# Patient Record
Sex: FEMALE | Race: WHITE | Employment: OTHER | URBAN - METROPOLITAN AREA
[De-identification: names, ages, dates, MRNs, and addresses within clinical notes are randomized per-mention and may not be internally consistent; named-entity substitution may affect disease eponyms.]

---

## 2020-02-27 ENCOUNTER — HOSPITAL ENCOUNTER (INPATIENT)
Age: 85
LOS: 2 days | Discharge: HOME | DRG: 291 | End: 2020-02-29
Attending: EMERGENCY MEDICINE | Admitting: INTERNAL MEDICINE
Payer: MEDICARE

## 2020-02-27 ENCOUNTER — APPOINTMENT (OUTPATIENT)
Dept: GENERAL RADIOLOGY | Age: 85
DRG: 291 | End: 2020-02-27
Attending: EMERGENCY MEDICINE
Payer: MEDICARE

## 2020-02-27 DIAGNOSIS — I50.21 ACUTE SYSTOLIC CONGESTIVE HEART FAILURE (HCC): ICD-10-CM

## 2020-02-27 DIAGNOSIS — R06.09 DOE (DYSPNEA ON EXERTION): Primary | ICD-10-CM

## 2020-02-27 PROBLEM — I50.9 HEART FAILURE (HCC): Status: ACTIVE | Noted: 2020-02-27

## 2020-02-27 LAB
ALBUMIN SERPL-MCNC: 2.3 G/DL (ref 3.5–5)
ALBUMIN/GLOB SERPL: 0.6 {RATIO} (ref 1.1–2.2)
ALP SERPL-CCNC: 219 U/L (ref 45–117)
ALT SERPL-CCNC: 20 U/L (ref 12–78)
ANION GAP SERPL CALC-SCNC: 7 MMOL/L (ref 5–15)
AST SERPL-CCNC: 22 U/L (ref 15–37)
BASOPHILS # BLD: 0.1 K/UL (ref 0–0.1)
BASOPHILS NFR BLD: 1 % (ref 0–1)
BILIRUB SERPL-MCNC: 0.2 MG/DL (ref 0.2–1)
BNP SERPL-MCNC: 4722 PG/ML
BUN SERPL-MCNC: 21 MG/DL (ref 6–20)
BUN/CREAT SERPL: 14 (ref 12–20)
CALCIUM SERPL-MCNC: 8.5 MG/DL (ref 8.5–10.1)
CHLORIDE SERPL-SCNC: 107 MMOL/L (ref 97–108)
CO2 SERPL-SCNC: 23 MMOL/L (ref 21–32)
COMMENT, HOLDF: NORMAL
CREAT SERPL-MCNC: 1.49 MG/DL (ref 0.55–1.02)
DIFFERENTIAL METHOD BLD: NORMAL
EOSINOPHIL # BLD: 0.2 K/UL (ref 0–0.4)
EOSINOPHIL NFR BLD: 2 % (ref 0–7)
ERYTHROCYTE [DISTWIDTH] IN BLOOD BY AUTOMATED COUNT: 12.9 % (ref 11.5–14.5)
GLOBULIN SER CALC-MCNC: 4.1 G/DL (ref 2–4)
GLUCOSE SERPL-MCNC: 117 MG/DL (ref 65–100)
HCT VFR BLD AUTO: 40.4 % (ref 35–47)
HGB BLD-MCNC: 13.5 G/DL (ref 11.5–16)
IMM GRANULOCYTES # BLD AUTO: 0 K/UL (ref 0–0.04)
IMM GRANULOCYTES NFR BLD AUTO: 0 % (ref 0–0.5)
LYMPHOCYTES # BLD: 1.2 K/UL (ref 0.8–3.5)
LYMPHOCYTES NFR BLD: 14 % (ref 12–49)
MCH RBC QN AUTO: 29.8 PG (ref 26–34)
MCHC RBC AUTO-ENTMCNC: 33.4 G/DL (ref 30–36.5)
MCV RBC AUTO: 89.2 FL (ref 80–99)
MONOCYTES # BLD: 0.8 K/UL (ref 0–1)
MONOCYTES NFR BLD: 9 % (ref 5–13)
NEUTS SEG # BLD: 6.6 K/UL (ref 1.8–8)
NEUTS SEG NFR BLD: 74 % (ref 32–75)
NRBC # BLD: 0 K/UL (ref 0–0.01)
NRBC BLD-RTO: 0 PER 100 WBC
PLATELET # BLD AUTO: 371 K/UL (ref 150–400)
PMV BLD AUTO: 9.9 FL (ref 8.9–12.9)
POTASSIUM SERPL-SCNC: 3.8 MMOL/L (ref 3.5–5.1)
PROT SERPL-MCNC: 6.4 G/DL (ref 6.4–8.2)
RBC # BLD AUTO: 4.53 M/UL (ref 3.8–5.2)
SAMPLES BEING HELD,HOLD: NORMAL
SODIUM SERPL-SCNC: 137 MMOL/L (ref 136–145)
TROPONIN I SERPL-MCNC: <0.05 NG/ML
WBC # BLD AUTO: 9 K/UL (ref 3.6–11)

## 2020-02-27 PROCEDURE — 74011250636 HC RX REV CODE- 250/636: Performed by: INTERNAL MEDICINE

## 2020-02-27 PROCEDURE — 74011250637 HC RX REV CODE- 250/637: Performed by: INTERNAL MEDICINE

## 2020-02-27 PROCEDURE — 96374 THER/PROPH/DIAG INJ IV PUSH: CPT

## 2020-02-27 PROCEDURE — 83880 ASSAY OF NATRIURETIC PEPTIDE: CPT

## 2020-02-27 PROCEDURE — 36415 COLL VENOUS BLD VENIPUNCTURE: CPT

## 2020-02-27 PROCEDURE — 93005 ELECTROCARDIOGRAM TRACING: CPT

## 2020-02-27 PROCEDURE — 74011000250 HC RX REV CODE- 250: Performed by: EMERGENCY MEDICINE

## 2020-02-27 PROCEDURE — 71046 X-RAY EXAM CHEST 2 VIEWS: CPT

## 2020-02-27 PROCEDURE — 85025 COMPLETE CBC W/AUTO DIFF WBC: CPT

## 2020-02-27 PROCEDURE — 80053 COMPREHEN METABOLIC PANEL: CPT

## 2020-02-27 PROCEDURE — 84484 ASSAY OF TROPONIN QUANT: CPT

## 2020-02-27 PROCEDURE — 65660000000 HC RM CCU STEPDOWN

## 2020-02-27 PROCEDURE — 99282 EMERGENCY DEPT VISIT SF MDM: CPT

## 2020-02-27 RX ORDER — ONDANSETRON 2 MG/ML
4 INJECTION INTRAMUSCULAR; INTRAVENOUS
Status: DISCONTINUED | OUTPATIENT
Start: 2020-02-27 | End: 2020-02-29 | Stop reason: HOSPADM

## 2020-02-27 RX ORDER — BISACODYL 5 MG
5 TABLET, DELAYED RELEASE (ENTERIC COATED) ORAL DAILY PRN
Status: DISCONTINUED | OUTPATIENT
Start: 2020-02-27 | End: 2020-02-29 | Stop reason: HOSPADM

## 2020-02-27 RX ORDER — SODIUM CHLORIDE 0.9 % (FLUSH) 0.9 %
5-40 SYRINGE (ML) INJECTION EVERY 8 HOURS
Status: DISCONTINUED | OUTPATIENT
Start: 2020-02-27 | End: 2020-02-29 | Stop reason: HOSPADM

## 2020-02-27 RX ORDER — FUROSEMIDE 10 MG/ML
40 INJECTION INTRAMUSCULAR; INTRAVENOUS DAILY
Status: DISCONTINUED | OUTPATIENT
Start: 2020-02-28 | End: 2020-02-27

## 2020-02-27 RX ORDER — ROSUVASTATIN CALCIUM 40 MG/1
40 TABLET, COATED ORAL
COMMUNITY
End: 2020-05-20 | Stop reason: SDUPTHER

## 2020-02-27 RX ORDER — ROSUVASTATIN CALCIUM 40 MG/1
40 TABLET, COATED ORAL
Status: DISCONTINUED | OUTPATIENT
Start: 2020-02-27 | End: 2020-02-29 | Stop reason: HOSPADM

## 2020-02-27 RX ORDER — ACETAMINOPHEN 325 MG/1
650 TABLET ORAL
Status: DISCONTINUED | OUTPATIENT
Start: 2020-02-27 | End: 2020-02-29 | Stop reason: HOSPADM

## 2020-02-27 RX ORDER — HYDRALAZINE HYDROCHLORIDE 50 MG/1
50 TABLET, FILM COATED ORAL 3 TIMES DAILY
Status: DISCONTINUED | OUTPATIENT
Start: 2020-02-27 | End: 2020-02-28

## 2020-02-27 RX ORDER — DIPHENHYDRAMINE HYDROCHLORIDE 50 MG/ML
12.5 INJECTION, SOLUTION INTRAMUSCULAR; INTRAVENOUS
Status: DISCONTINUED | OUTPATIENT
Start: 2020-02-27 | End: 2020-02-29 | Stop reason: HOSPADM

## 2020-02-27 RX ORDER — BUMETANIDE 0.25 MG/ML
1 INJECTION INTRAMUSCULAR; INTRAVENOUS
Status: COMPLETED | OUTPATIENT
Start: 2020-02-27 | End: 2020-02-27

## 2020-02-27 RX ORDER — ASPIRIN 81 MG/1
81 TABLET ORAL DAILY
Status: DISCONTINUED | OUTPATIENT
Start: 2020-02-28 | End: 2020-02-29 | Stop reason: HOSPADM

## 2020-02-27 RX ORDER — IPRATROPIUM BROMIDE AND ALBUTEROL SULFATE 2.5; .5 MG/3ML; MG/3ML
3 SOLUTION RESPIRATORY (INHALATION)
Status: DISCONTINUED | OUTPATIENT
Start: 2020-02-27 | End: 2020-02-29 | Stop reason: HOSPADM

## 2020-02-27 RX ORDER — ASPIRIN 81 MG/1
81 TABLET ORAL DAILY
COMMUNITY
End: 2021-04-23

## 2020-02-27 RX ORDER — METOPROLOL TARTRATE 50 MG/1
50 TABLET ORAL 2 TIMES DAILY
COMMUNITY
End: 2020-02-29

## 2020-02-27 RX ORDER — VENLAFAXINE HYDROCHLORIDE 75 MG/1
75 CAPSULE, EXTENDED RELEASE ORAL DAILY
COMMUNITY

## 2020-02-27 RX ORDER — FUROSEMIDE 10 MG/ML
40 INJECTION INTRAMUSCULAR; INTRAVENOUS ONCE
Status: COMPLETED | OUTPATIENT
Start: 2020-02-27 | End: 2020-02-27

## 2020-02-27 RX ORDER — FUROSEMIDE 10 MG/ML
40 INJECTION INTRAMUSCULAR; INTRAVENOUS 2 TIMES DAILY
Status: DISCONTINUED | OUTPATIENT
Start: 2020-02-28 | End: 2020-02-29

## 2020-02-27 RX ORDER — HYDRALAZINE HYDROCHLORIDE 20 MG/ML
20 INJECTION INTRAMUSCULAR; INTRAVENOUS
Status: DISCONTINUED | OUTPATIENT
Start: 2020-02-27 | End: 2020-02-29 | Stop reason: HOSPADM

## 2020-02-27 RX ORDER — ONDANSETRON 2 MG/ML
4 INJECTION INTRAMUSCULAR; INTRAVENOUS
Status: DISCONTINUED | OUTPATIENT
Start: 2020-02-27 | End: 2020-02-27

## 2020-02-27 RX ORDER — METOPROLOL TARTRATE 50 MG/1
50 TABLET ORAL 2 TIMES DAILY
Status: DISCONTINUED | OUTPATIENT
Start: 2020-02-27 | End: 2020-02-28

## 2020-02-27 RX ORDER — VENLAFAXINE HYDROCHLORIDE 37.5 MG/1
75 CAPSULE, EXTENDED RELEASE ORAL DAILY
Status: DISCONTINUED | OUTPATIENT
Start: 2020-02-28 | End: 2020-02-29 | Stop reason: HOSPADM

## 2020-02-27 RX ORDER — HYDRALAZINE HYDROCHLORIDE 50 MG/1
25 TABLET, FILM COATED ORAL 3 TIMES DAILY
Status: DISCONTINUED | OUTPATIENT
Start: 2020-02-27 | End: 2020-02-27

## 2020-02-27 RX ORDER — HYDRALAZINE HYDROCHLORIDE 50 MG/1
50 TABLET, FILM COATED ORAL 3 TIMES DAILY
COMMUNITY
End: 2020-02-29

## 2020-02-27 RX ORDER — SODIUM CHLORIDE 0.9 % (FLUSH) 0.9 %
5-40 SYRINGE (ML) INJECTION AS NEEDED
Status: DISCONTINUED | OUTPATIENT
Start: 2020-02-27 | End: 2020-02-29 | Stop reason: HOSPADM

## 2020-02-27 RX ORDER — DOCUSATE SODIUM 100 MG/1
100 CAPSULE, LIQUID FILLED ORAL AS NEEDED
Status: DISCONTINUED | OUTPATIENT
Start: 2020-02-27 | End: 2020-02-28

## 2020-02-27 RX ORDER — AMLODIPINE BESYLATE 5 MG/1
10 TABLET ORAL DAILY
Status: DISCONTINUED | OUTPATIENT
Start: 2020-02-27 | End: 2020-02-29 | Stop reason: HOSPADM

## 2020-02-27 RX ORDER — AMLODIPINE BESYLATE 5 MG/1
5 TABLET ORAL 2 TIMES DAILY
COMMUNITY
End: 2021-06-29

## 2020-02-27 RX ADMIN — AMLODIPINE BESYLATE 10 MG: 5 TABLET ORAL at 18:40

## 2020-02-27 RX ADMIN — APIXABAN 2.5 MG: 2.5 TABLET, FILM COATED ORAL at 19:08

## 2020-02-27 RX ADMIN — ROSUVASTATIN 40 MG: 40 TABLET, FILM COATED ORAL at 21:36

## 2020-02-27 RX ADMIN — HYDRALAZINE HYDROCHLORIDE 50 MG: 50 TABLET, FILM COATED ORAL at 21:36

## 2020-02-27 RX ADMIN — BUMETANIDE 1 MG: 0.25 INJECTION INTRAMUSCULAR; INTRAVENOUS at 17:27

## 2020-02-27 RX ADMIN — Medication 10 ML: at 21:35

## 2020-02-27 RX ADMIN — METOPROLOL TARTRATE 50 MG: 50 TABLET, FILM COATED ORAL at 18:44

## 2020-02-27 RX ADMIN — FUROSEMIDE 40 MG: 10 INJECTION, SOLUTION INTRAMUSCULAR; INTRAVENOUS at 19:08

## 2020-02-27 NOTE — ED TRIAGE NOTES
Patient arrives c/o shortness of breath. Patient also has increased fluid retention. Patient takes a diuretic and they called her nephrologist and they instructed patient to come to the ER. Connie MARTINEZ.

## 2020-02-27 NOTE — ED PROVIDER NOTES
Ms. Kylah Echavarria is an 59-year-old female who presents to the ER with complaints of shortness of breath and swelling. She said that she was recently discharged from the hospital 1/2 weeks ago. She was admitted in [place]. She said that she had been admitted at that hospital at the time after a fall. She is on Eliquis. They were checking her for possible injury from the fall. Currently, she had several studies done with IV contrast.  Her kidney function worsened while she was there. She was discharged and went home with her daughter here in [place]. Since she was discharged, she has had worsening swelling of her bilateral arms and legs. He said that her swelling of her arms and legs seems to change. Over the last day or so she has had mostly swelling of her both of her arms. Her leg swelling seems to have improved over the last day. However, her daughter says it does come and go regularly. Over the last 2 days she is developed significant shortness of breath with exertion. She said that she cannot go but a short distance without having to stop due to shortness of breath. She denies any chest pain. She denies any other complaints. Past Medical History:   Diagnosis Date    A-fib [facility])     Bilateral cataracts     Bronchitis     CKD (chronic kidney disease)     GERD (gastroesophageal reflux disease)     HTN (hypertension)     Hyperlipidemia     Insomnia     Stuttering        Past Surgical History:   Procedure Laterality Date    HX CAROTID ENDARTERECTOMY      HX HYSTERECTOMY      HX TONSILLECTOMY           History reviewed. No pertinent family history.     Social History     Socioeconomic History    Marital status: SINGLE     Spouse name: Not on file    Number of children: Not on file    Years of education: Not on file    Highest education level: Not on file   Occupational History    Not on file   Social Needs    Financial resource strain: Not on file    Food insecurity: Worry: Not on file     Inability: Not on file    Transportation needs:     Medical: Not on file     Non-medical: Not on file   Tobacco Use    Smoking status: Never Smoker    Smokeless tobacco: Never Used   Substance and Sexual Activity    Alcohol use: Never     Frequency: Never    Drug use: Never    Sexual activity: Not on file   Lifestyle    Physical activity:     Days per week: Not on file     Minutes per session: Not on file    Stress: Not on file   Relationships    Social connections:     Talks on phone: Not on file     Gets together: Not on file     Attends Jain service: Not on file     Active member of club or organization: Not on file     Attends meetings of clubs or organizations: Not on file     Relationship status: Not on file    Intimate partner violence:     Fear of current or ex partner: Not on file     Emotionally abused: Not on file     Physically abused: Not on file     Forced sexual activity: Not on file   Other Topics Concern    Not on file   Social History Narrative    Not on file         ALLERGIES: Keflex [cephalexin]    Review of Systems   Constitutional: Negative for chills and fever. HENT: Negative for rhinorrhea and sore throat. Respiratory: Positive for shortness of breath. Negative for cough. Cardiovascular: Negative for chest pain. Gastrointestinal: Negative for abdominal pain, diarrhea, nausea and vomiting. Genitourinary: Negative for dysuria and urgency. Musculoskeletal:        Swelling of her arms and legs   Skin: Negative for rash. Neurological: Negative for dizziness, weakness and light-headedness. Vitals:    02/27/20 1423   BP: 152/67   Pulse: 67   Resp: 18   Temp: 97.8 °F (36.6 °C)   SpO2: 93%   Weight: 68.5 kg (151 lb)   Height: 5' (1.524 m)            Physical Exam       Vital signs reviewed. Nursing notes reviewed.     Const:  No acute distress, well developed, well nourished  Head:  Atraumatic, normocephalic  Eyes:  PERRL, conjunctiva normal, no scleral icterus  Neck:  Supple, trachea midline  Cardiovascular:  RRR, no murmurs, no gallops, no rubs  Resp:  No resp distress, mild increased work of breathing, diffuse wheezes, no rhonchi, no rales,  Abd:  Soft, non-tender, non-distended, no rebound, no guarding, no CVA tenderness  MSK:  No pedal edema, normal ROM, pitting edema of the bilateral upper extremities  Neuro:  Alert and oriented x3, no cranial nerve defect  Skin:  Warm, dry, intact  Psych: normal mood and affect, behavior is normal, judgement and thought content is normal        MDM  Number of Diagnoses or Management Options  Acute systolic congestive heart failure (HCC):   PERES (dyspnea on exertion):      Amount and/or Complexity of Data Reviewed  Clinical lab tests: ordered and reviewed  Tests in the radiology section of CPT®: ordered and reviewed  Review and summarize past medical records: yes    Patient Progress  Patient progress: stable          Mrs. Alanna De is an 80-year-old female who presents to the ER with complaints of shortness of breath. She is found to have a pedal edema and pulmonary edema. Her BNP was elevated. Usually she has congestive heart failure. She does not carry this diagnosis previously. I will give her an x-ray dose of diuretics.   She is to be evaluated for admission by the hospitalist.      Procedures

## 2020-02-28 ENCOUNTER — APPOINTMENT (OUTPATIENT)
Dept: NON INVASIVE DIAGNOSTICS | Age: 85
DRG: 291 | End: 2020-02-28
Attending: INTERNAL MEDICINE
Payer: MEDICARE

## 2020-02-28 ENCOUNTER — APPOINTMENT (OUTPATIENT)
Dept: VASCULAR SURGERY | Age: 85
DRG: 291 | End: 2020-02-28
Attending: INTERNAL MEDICINE
Payer: MEDICARE

## 2020-02-28 LAB
ALBUMIN SERPL-MCNC: 2 G/DL (ref 3.5–5)
ALBUMIN/GLOB SERPL: 0.5 {RATIO} (ref 1.1–2.2)
ALP SERPL-CCNC: 180 U/L (ref 45–117)
ALT SERPL-CCNC: 16 U/L (ref 12–78)
ANION GAP SERPL CALC-SCNC: 12 MMOL/L (ref 5–15)
AST SERPL-CCNC: 22 U/L (ref 15–37)
ATRIAL RATE: 71 BPM
AV PEAK GRADIENT: 45.12 MMHG
BILIRUB SERPL-MCNC: 0.2 MG/DL (ref 0.2–1)
BNP SERPL-MCNC: 3497 PG/ML
BUN SERPL-MCNC: 22 MG/DL (ref 6–20)
BUN/CREAT SERPL: 14 (ref 12–20)
CALCIUM SERPL-MCNC: 8.1 MG/DL (ref 8.5–10.1)
CALCULATED P AXIS, ECG09: 27 DEGREES
CALCULATED R AXIS, ECG10: 13 DEGREES
CALCULATED T AXIS, ECG11: 11 DEGREES
CHLORIDE SERPL-SCNC: 107 MMOL/L (ref 97–108)
CHOLEST SERPL-MCNC: 133 MG/DL
CO2 SERPL-SCNC: 21 MMOL/L (ref 21–32)
CREAT SERPL-MCNC: 1.54 MG/DL (ref 0.55–1.02)
DIAGNOSIS, 93000: NORMAL
ECHO AO ROOT DIAM: 2.93 CM
ECHO AV PEAK GRADIENT: 17.7 MMHG
ECHO AV PEAK VELOCITY: 210.44 CM/S
ECHO AV REGURGITANT PHT: 465.7 CM
ECHO LA AREA 4C: 27.5 CM2
ECHO LA MAJOR AXIS: 4.72 CM
ECHO LA TO AORTIC ROOT RATIO: 1.61
ECHO LA VOL 2C: 66.82 ML (ref 22–52)
ECHO LA VOL 4C: 92.08 ML (ref 22–52)
ECHO LA VOL BP: 84.75 ML (ref 22–52)
ECHO LA VOL/BSA BIPLANE: 49.37 ML/M2 (ref 16–28)
ECHO LA VOLUME INDEX A2C: 38.92 ML/M2 (ref 16–28)
ECHO LA VOLUME INDEX A4C: 53.64 ML/M2 (ref 16–28)
ECHO LV E' LATERAL VELOCITY: 6.82 CM/S
ECHO LV E' SEPTAL VELOCITY: 6.46 CM/S
ECHO LV INTERNAL DIMENSION DIASTOLIC: 4.61 CM (ref 3.9–5.3)
ECHO LV INTERNAL DIMENSION SYSTOLIC: 3.04 CM
ECHO LV IVSD: 0.85 CM (ref 0.6–0.9)
ECHO LV MASS 2D: 138.4 G (ref 67–162)
ECHO LV MASS INDEX 2D: 80.6 G/M2 (ref 43–95)
ECHO LV POSTERIOR WALL DIASTOLIC: 0.79 CM (ref 0.6–0.9)
ECHO LVOT DIAM: 1.61 CM
ECHO MV A VELOCITY: 104.33 CM/S
ECHO MV AREA PHT: 3.7 CM2
ECHO MV E DECELERATION TIME (DT): 203.3 MS
ECHO MV E VELOCITY: 115.08 CM/S
ECHO MV E/A RATIO: 1.1
ECHO MV E/E' LATERAL: 16.87
ECHO MV E/E' RATIO (AVERAGED): 17.34
ECHO MV E/E' SEPTAL: 17.81
ECHO MV PRESSURE HALF TIME (PHT): 58.9 MS
ECHO PULMONARY ARTERY SYSTOLIC PRESSURE (PASP): 45 MMHG
ECHO PV MAX VELOCITY: 87.93 CM/S
ECHO PV PEAK GRADIENT: 3.1 MMHG
ECHO RV INTERNAL DIMENSION: 3.93 CM
ECHO RV TAPSE: 2.59 CM (ref 1.5–2)
ECHO TV REGURGITANT MAX VELOCITY: 306.47 CM/S
ECHO TV REGURGITANT PEAK GRADIENT: 37.6 MMHG
GLOBULIN SER CALC-MCNC: 3.7 G/DL (ref 2–4)
GLUCOSE SERPL-MCNC: 90 MG/DL (ref 65–100)
HDLC SERPL-MCNC: 55 MG/DL
HDLC SERPL: 2.4 {RATIO} (ref 0–5)
LDLC SERPL CALC-MCNC: 37.8 MG/DL (ref 0–100)
LIPID PROFILE,FLP: ABNORMAL
LVFS 2D: 34.18 %
MV DEC SLOPE: 5.66
P-R INTERVAL, ECG05: 154 MS
PISA AR MAX VEL: 335.87 CM/S
POTASSIUM SERPL-SCNC: 3.8 MMOL/L (ref 3.5–5.1)
PROT SERPL-MCNC: 5.7 G/DL (ref 6.4–8.2)
Q-T INTERVAL, ECG07: 412 MS
QRS DURATION, ECG06: 78 MS
QTC CALCULATION (BEZET), ECG08: 447 MS
SODIUM SERPL-SCNC: 140 MMOL/L (ref 136–145)
TRIGL SERPL-MCNC: 201 MG/DL (ref ?–150)
TSH SERPL DL<=0.05 MIU/L-ACNC: 3.43 UIU/ML (ref 0.36–3.74)
VENTRICULAR RATE, ECG03: 71 BPM
VLDLC SERPL CALC-MCNC: 40.2 MG/DL

## 2020-02-28 PROCEDURE — 74011250636 HC RX REV CODE- 250/636: Performed by: INTERNAL MEDICINE

## 2020-02-28 PROCEDURE — 97161 PT EVAL LOW COMPLEX 20 MIN: CPT

## 2020-02-28 PROCEDURE — 74011250637 HC RX REV CODE- 250/637: Performed by: INTERNAL MEDICINE

## 2020-02-28 PROCEDURE — 93306 TTE W/DOPPLER COMPLETE: CPT

## 2020-02-28 PROCEDURE — 97116 GAIT TRAINING THERAPY: CPT

## 2020-02-28 PROCEDURE — 93970 EXTREMITY STUDY: CPT

## 2020-02-28 PROCEDURE — 65660000000 HC RM CCU STEPDOWN

## 2020-02-28 PROCEDURE — 84443 ASSAY THYROID STIM HORMONE: CPT

## 2020-02-28 PROCEDURE — 94760 N-INVAS EAR/PLS OXIMETRY 1: CPT

## 2020-02-28 PROCEDURE — 36415 COLL VENOUS BLD VENIPUNCTURE: CPT

## 2020-02-28 PROCEDURE — 80053 COMPREHEN METABOLIC PANEL: CPT

## 2020-02-28 PROCEDURE — 80061 LIPID PANEL: CPT

## 2020-02-28 PROCEDURE — 83880 ASSAY OF NATRIURETIC PEPTIDE: CPT

## 2020-02-28 PROCEDURE — 74011250637 HC RX REV CODE- 250/637: Performed by: NURSE PRACTITIONER

## 2020-02-28 RX ORDER — DOCUSATE SODIUM 100 MG/1
100 CAPSULE, LIQUID FILLED ORAL
Status: DISCONTINUED | OUTPATIENT
Start: 2020-02-28 | End: 2020-02-29 | Stop reason: HOSPADM

## 2020-02-28 RX ORDER — CARVEDILOL 12.5 MG/1
12.5 TABLET ORAL 2 TIMES DAILY WITH MEALS
Status: DISCONTINUED | OUTPATIENT
Start: 2020-02-28 | End: 2020-02-29 | Stop reason: HOSPADM

## 2020-02-28 RX ADMIN — APIXABAN 2.5 MG: 2.5 TABLET, FILM COATED ORAL at 09:45

## 2020-02-28 RX ADMIN — Medication 10 ML: at 09:45

## 2020-02-28 RX ADMIN — FUROSEMIDE 40 MG: 10 INJECTION, SOLUTION INTRAMUSCULAR; INTRAVENOUS at 09:45

## 2020-02-28 RX ADMIN — ROSUVASTATIN 40 MG: 40 TABLET, FILM COATED ORAL at 21:24

## 2020-02-28 RX ADMIN — Medication 10 ML: at 13:45

## 2020-02-28 RX ADMIN — AMLODIPINE BESYLATE 10 MG: 5 TABLET ORAL at 09:45

## 2020-02-28 RX ADMIN — HYDRALAZINE HYDROCHLORIDE 50 MG: 50 TABLET, FILM COATED ORAL at 09:44

## 2020-02-28 RX ADMIN — FUROSEMIDE 40 MG: 10 INJECTION, SOLUTION INTRAMUSCULAR; INTRAVENOUS at 17:16

## 2020-02-28 RX ADMIN — APIXABAN 2.5 MG: 2.5 TABLET, FILM COATED ORAL at 17:17

## 2020-02-28 RX ADMIN — METOPROLOL TARTRATE 50 MG: 50 TABLET, FILM COATED ORAL at 09:45

## 2020-02-28 RX ADMIN — VENLAFAXINE HYDROCHLORIDE 75 MG: 37.5 CAPSULE, EXTENDED RELEASE ORAL at 09:45

## 2020-02-28 RX ADMIN — CARVEDILOL 12.5 MG: 12.5 TABLET, FILM COATED ORAL at 17:17

## 2020-02-28 RX ADMIN — ASPIRIN 81 MG: 81 TABLET, COATED ORAL at 09:45

## 2020-02-28 RX ADMIN — Medication 10 ML: at 21:24

## 2020-02-28 RX ADMIN — Medication 10 ML: at 07:01

## 2020-02-28 NOTE — PROGRESS NOTES
Bedside and Verbal shift change report given to Julia W Kadeem Mir RN (oncoming nurse) by JANELL Francis RN (offgoing nurse). Report given with SBAR, Kardex, Intake/Output, MAR and Recent Results.

## 2020-02-28 NOTE — DISCHARGE SUMMARY
[address] [facility] Adult  Hospitalist Group                                                                                          Hospitalist Progress Note  Loye Siemens, MD  Answering service: [phone] OR [phone] from in house phone        Date of Service:  2020  NAME:  Rekha Jefferson  :  1932  MRN:  [id number]      Admission Summary:   Rekha Jefferson is a 80 y.o. female who presents with shortness of breath, swelling of arms. Patient is accompanied with daughter. History obtained mostly from patient's daughter, Minh Brar. She reported patient had a fall 2 weeks ago and she was admitted in [facility] in [place], where she was evaluated for fall. She also had kidney functions abnormal in the hospital there and was on IV diuretics. Patient was seen by nephrologist in the hospital and was continued on diuretic pill on discharge. Daughter reports that but in discharge medication she could not find any diuretic. After her discharge she saw PCP in [place] and also nephrologist.  But gradually her swelling in both legs and arms increased and today she was very short of breath with wheezing. Therefore they decided to come to the [facility] ER. Interval history / Subjective:   Breathing is improved today. Bilateral arm swelling persists.  She is unsure if they checked her for a blood clot at her prior hospital.      Assessment & Plan:     Dyspnea, upper extremity swelling likely secondary to CHF exacerbation, diastolic   Elevated proBNP, Rales in the lungs bases  Monitor on telemetry   Monitor input and output, daily weights, fluid restriction, low-salt diet  Continue IV lasix, and plan to transition to PO tomorrow   Echocardiogram with diastolic failure  Consult cardiology  Obtain doppler UE to r/o DVT     JIM on CKD 3  Baseline creatinine unknown  Monitor kidney functions`  Avoid nephrotoxins  Need baseline data from prior hospitalization      Accelerated hypertension  Resume home medications including hydralazine 50 mg 3 times daily, amlodipine 10 mg daily, Lopressor 50 mg twice daily  Hydralazine PRN     Paroxysmal A. Fib rate controlled   continue Lopressor and Eliquis      history of TIA  Continue aspirin and statin     History of anxiety/depression  Continue venlafaxine     GERD  Stable    Code status: FULL  DVT prophylaxis: eliquis     Care Plan discussed with: Patient/Family  Anticipated Disposition: Home w/Family  Anticipated Discharge: Less than 24 hours     Hospital Problems  Never Reviewed          Codes Class Noted POA    Heart failure ([facility] [address] ICD-10-CM: I50.9  ICD-9-CM: 428.9  2/27/2020 Unknown                Review of Systems:   A comprehensive review of systems was negative except for that written in the HPI. Vital Signs:    Last 24hrs VS reviewed since prior progress note. Most recent are:  Visit Vitals  /56 (BP 1 Location: Right arm, BP Patient Position: At rest)   Pulse 77   Temp 98.4 °F (36.9 °C)   Resp 18   Ht 5' (1.524 m)   Wt 74.5 kg (164 lb 3.9 oz)   SpO2 95%   Breastfeeding No   BMI 32.08 kg/m²         Intake/Output Summary (Last 24 hours) at 2/28/2020 1659  Last data filed at 2/28/2020 1212  Gross per 24 hour   Intake 430 ml   Output    Net 430 ml        Physical Examination:             Constitutional:  No acute distress, cooperative, pleasant    ENT:  Oral mucosa moist, oropharynx benign. Resp:  Crackles in the bases. No wheezing/rhonchi/rales. No accessory muscle use   CV:  Regular rhythm, normal rate, no murmurs, gallops, rubs    GI:  Soft, non distended, non tender. normoactive bowel sounds, no hepatosplenomegaly     Musculoskeletal:  b/l upper extremity edema, R>L , warm, 2+ pulses throughout    Neurologic:  Moves all extremities.   AAOx3, CN II-XII reviewed     Skin:  Good turgor, no rashes or ulcers       Data Review:    Review and/or order of clinical lab test  Review and/or order of tests in the radiology section of CPT  Review and/or order of tests in the medicine section of CPT      Labs:     Recent Labs     02/27/20  1430   WBC 9.0   HGB 13.5   HCT 40.4        Recent Labs     02/28/20  0501 02/27/20  1430    137   K 3.8 3.8    107   CO2 21 23   BUN 22* 21*   CREA 1.54* 1.49*   GLU 90 117*   CA 8.1* 8.5     Recent Labs     02/28/20  0501 02/27/20  1430   SGOT 22 22   ALT 16 20   * 219*   TBILI 0.2 0.2   TP 5.7* 6.4   ALB 2.0* 2.3*   GLOB 3.7 4.1*     No results for input(s): INR, PTP, APTT, INREXT in the last 72 hours. No results for input(s): FE, TIBC, PSAT, FERR in the last 72 hours. No results found for: FOL, RBCF   No results for input(s): PH, PCO2, PO2 in the last 72 hours.   Recent Labs     02/27/20  1430   TROIQ <0.05     Lab Results   Component Value Date/Time    Cholesterol, total 133 02/28/2020 05:01 AM    HDL Cholesterol 55 02/28/2020 05:01 AM    LDL, calculated 37.8 02/28/2020 05:01 AM    Triglyceride 201 (H) 02/28/2020 05:01 AM    CHOL/HDL Ratio 2.4 02/28/2020 05:01 AM     No results found for: GLUCPOC  No results found for: COLOR, APPRN, SPGRU, REFSG, DEVIN, PROTU, GLUCU, KETU, BILU, UROU, BENJAMIN, LEUKU, GLUKE, EPSU, BACTU, WBCU, RBCU, CASTS, UCRY      Medications Reviewed:     Current Facility-Administered Medications   Medication Dose Route Frequency    carvediloL (COREG) tablet 12.5 mg  12.5 mg Oral BID WITH MEALS    docusate sodium (COLACE) capsule 100 mg  100 mg Oral BID PRN    ondansetron (ZOFRAN) injection 4 mg  4 mg IntraVENous Q6H PRN    amLODIPine (NORVASC) tablet 10 mg  10 mg Oral DAILY    apixaban (ELIQUIS) tablet 2.5 mg  2.5 mg Oral BID    aspirin delayed-release tablet 81 mg  81 mg Oral DAILY    venlafaxine-SR (EFFEXOR-XR) capsule 75 mg  75 mg Oral DAILY    furosemide (LASIX) injection 40 mg  40 mg IntraVENous BID    hydrALAZINE (APRESOLINE) 20 mg/mL injection 20 mg  20 mg IntraVENous Q6H PRN    sodium chloride (NS) flush 5-40 mL  5-40 mL IntraVENous Q8H    sodium chloride (NS) flush 5-40 mL  5-40 mL IntraVENous PRN    acetaminophen (TYLENOL) tablet 650 mg  650 mg Oral Q4H PRN    diphenhydrAMINE (BENADRYL) injection 12.5 mg  12.5 mg IntraVENous Q4H PRN    bisacodyL (DULCOLAX) tablet 5 mg  5 mg Oral DAILY PRN    rosuvastatin (CRESTOR) tablet 40 mg  40 mg Oral QHS    albuterol-ipratropium (DUO-NEB) 2.5 MG-0.5 MG/3 ML  3 mL Nebulization Q6H PRN     ______________________________________________________________________  EXPECTED LENGTH OF STAY: - - -  ACTUAL LENGTH OF STAY:          1                 Lisa Gonsales MD

## 2020-02-28 NOTE — PROGRESS NOTES
1630: Bedside shift change report given to Samia Moore (oncoming nurse) by Johny Rincon (offgoing nurse). Report included the following information SBAR, Kardex, Intake/Output, MAR and Cardiac Rhythm NSA. 1930: Bedside shift change report given to Anabel (oncoming nurse) byLauryn  (offgoing nurse). Report included the following information SBAR, Kardex, Intake/Output and MAR.

## 2020-02-28 NOTE — CONSULTS
Cardiology Consult Note      Patient Name: Karl Greenwood  : 1932 MRN: [id number]  Date: 2020  Time: 10:00 AM    Admit Diagnosis: Heart failure [facility]) [I50.9]    Primary Cardiologist:  Cardiologist in [place]. Consulting Cardiologist: Abiola Gomez M.D.    Reason for Consult: Aissatou Gutierrez    Requesting MD: Dr. Zeina Morrison MD    HPI:  Karl Greenwood is a 80 y.o. female admitted on 2020  for Heart failure ([facility] [address] [I50.9]. Has PMH of HTN, HLD, PAF (on eliquis), GERD, CKD, Carotid artery disease s/p bilateral carotid endarterectomies, TIA. She presents with chief c/o SOB and edema of BLE and BUE extremities. She was discharged from [place] [address] 2 weeks ago where she was hospitalized for approximately 1 week after a fall. The circumstances of the fall are unknown although daughter notes she was told pt was dehydrated on presentation at that time. Patient did not remember the fall. She was evaluated at the hospital and daughter reports that she developed worsened kidney function due to contrast studies. She says she was supposed to be discharged on diuretic but was not. She went to see nephrologist few days later and he did not have her on diuretic. Daughter then brought pt to [facility] to stay with her for a while. She states that SOB worsened and she had worsened BLE and BUE edema prompting visit to ER. Pt notes that she has had PERES for approximately 6 months. Although she also states that she exercises by walking on treadmill and lifting some light weights without SOB. She said she had a stress test years ago. Denies any history of MI or CHF. Denies any history of chest pain. In ER, she was noted to have mild edema on CXR with bilateral pleural effusions, pro BNP>4000 and troponin negative. She has been receiving IV lasix BID.     Records found in careeverywhere (epic) noted transthoracic Echo in 5/2018 with EF 70%, concentric remodeling, mild MR, trivial pericardial effusion, Mild AI. SH: never smoker, no chewing tobacco. Occasional etoh use  FH: Mother had cva in her 66's. Father had MI in his 66's. Subjective:  Currently denies chest pain, dizziness. Reports mild SOB which she says has improved since admission. Assessment and Plan     1. Acute diastolic CHF:  New diagnosis. -Pro BNP 4722 on admit. -CXR with early mild congestive changes and Bilateral pleural effusions.   -Preliminary review of echo notes normal EF. Will review final report. -NYHA IV on admission   -Control BP (see below)     2. CKD: stage IIIB   -creatinine with slight trend up (1.54)   -Renal disease may also be contributing to elevated pro BNP   -Recheck bmp in a.m. 3. HTN:  bp uncontrolled   -Continue amlodipine 10 mg daily   -Stop hydralazine   -Add Coreg 12.5 mg BID and stop Lopressor   -prn hydralazine    4. Hx of PAF:  NSR on admission, Now afib, rate controlled   -Change Lopressor to Coreg as above   -[id number]=5 (age, female, HTN, CHF, TIA, Carotid disease):  Continue eliquis 2.5 mg BID (adjusted for age, creatinine)   -TSH: 3.43     5. HLD:  LCL 37, HDL 55   -Continue Crestor 40 mg daily    6. Hx of TIA:   -on statin, asa PTA and here. 80 y.o. female with PMH which includes HTN, PAF, CKD. Presents with chief c/o SOB, mild extremity edema,  mild congestive changes on CXR as well as elevated pro BNP. Preliminary review of echocardiogram indicates normal EF. Improving with diuretics. Suspect edema, elevated pro BNP suspect related to abnormal kidney function and hypertensive heart disease/acute diastolic CHF. Agree with continued diuretics and change antihypertensives as above. Cardiology Attending:Patient seen and examined. I agree with NP assessment and plans. BP should improve with loop diuretic. Farhan Colon MD 2/28/2020 1:59 PM         Cardiac testing hx:  TTE 5/2018: TTE 05/16/2018: \"1. Left Ventricle: The left ventricle is small. Normal left ventricular ejection fraction. LV Ejection Fraction is 70 %. Concentric remodeling. 2. Left Atrium: The left atrium is normal in size. 3. Mitral Valve: There is mild primary mitral valve regurgitation. 4. Pulmonic Valve: There is trivial pulmonic regurgitation. 5. Pericardium: Trivial pericardial effusion. 6. Aortic Valve: Mild aortic valve regurgitation. 7. Atrial Septum: Agitated saline bubble study is negative for intracardiac shunt. No shunt by color Doppler. \"    Review of Symptoms:  Constitutional: Negative for fever, chills, weight loss,    HEENT: Negative for nosebleeds, vision changes. Respiratory:Positive mild SOB  Cardiovascular: Negative for chest pain, palpitations, orthopnea,positive arm and leg swelling, negative syncope  Gastrointestinal: Negative for nausea, vomiting,  blood in stool and melena, abdominal pain   Genitourinary: Negative for dysuria, and hematuria. Musculoskeletal: Negative for myalgias  Skin: Negative for rash.    Heme: no bleeding  Neurological: Negative for speech changes and focal weakness      Previous treatment/evaluation includes remote stress test, echocardiogram .  Cardiac risk factors: dyslipidemia, hypertension, post-menopausal.    Past Medical History:   Diagnosis Date    A[facility]fib ([facility] [address]     Bilateral cataracts     Bronchitis     CKD (chronic kidney disease)     GERD (gastroesophageal reflux disease)     HTN (hypertension)     Hyperlipidemia     Insomnia     Stuttering      Past Surgical History:   Procedure Laterality Date    HX CAROTID ENDARTERECTOMY      HX HYSTERECTOMY      HX TONSILLECTOMY       Current Facility-Administered Medications   Medication Dose Route Frequency    carvediloL (COREG) tablet 12.5 mg  12.5 mg Oral BID WITH MEALS    ondansetron (ZOFRAN) injection 4 mg  4 mg IntraVENous Q6H PRN    docusate sodium (COLACE) capsule 100 mg  100 mg Oral PRN    amLODIPine (NORVASC) tablet 10 mg  10 mg Oral DAILY    apixaban (ELIQUIS) tablet 2.5 mg  2.5 mg Oral BID    aspirin delayed-release tablet 81 mg  81 mg Oral DAILY    venlafaxine-SR (EFFEXOR-XR) capsule 75 mg  75 mg Oral DAILY    furosemide (LASIX) injection 40 mg  40 mg IntraVENous BID    hydrALAZINE (APRESOLINE) 20 mg/mL injection 20 mg  20 mg IntraVENous Q6H PRN    sodium chloride (NS) flush 5-40 mL  5-40 mL IntraVENous Q8H    sodium chloride (NS) flush 5-40 mL  5-40 mL IntraVENous PRN    acetaminophen (TYLENOL) tablet 650 mg  650 mg Oral Q4H PRN    diphenhydrAMINE (BENADRYL) injection 12.5 mg  12.5 mg IntraVENous Q4H PRN    bisacodyL (DULCOLAX) tablet 5 mg  5 mg Oral DAILY PRN    rosuvastatin (CRESTOR) tablet 40 mg  40 mg Oral QHS    albuterol-ipratropium (DUO-NEB) 2.5 MG-0.5 MG/3 ML  3 mL Nebulization Q6H PRN       Allergies   Allergen Reactions    Keflex [Cephalexin] Hives      History reviewed. No pertinent family history. Social History     Socioeconomic History    Marital status: SINGLE     Spouse name: Not on file    Number of children: Not on file    Years of education: Not on file    Highest education level: Not on file   Tobacco Use    Smoking status: Never Smoker    Smokeless tobacco: Never Used   Substance and Sexual Activity    Alcohol use: Never     Frequency: Never    Drug use: Never       Objective:    Physical Exam    Vitals:   Vitals:    02/27/20 2329 02/28/20 0451 02/28/20 0703 02/28/20 0932   BP: 134/40 160/51 154/52 154/52   Pulse: 68 79 69    Resp: 20 18 16    Temp: 98.4 °F (36.9 °C) 97.7 °F (36.5 °C) 97.7 °F (36.5 °C)    SpO2: 92% 94% 92%    Weight:  164 lb 3.9 oz (74.5 kg)  164 lb 3.9 oz (74.5 kg)   Height:    5' (1.524 m)       General:    Alert, cooperative, no distress, appears stated age. Neck:   Supple, no carotid bruit and no JVD.    Back:     Symmetric,    Lungs:     Faint expiratory wheeze bilaterally   Heart[de-identified]    Irregularly irregular  rate and rhythm, S1, S2 normal, no murmur, click, rub or gallop. Abdomen:     Soft, non-tender. Bowel sounds normal. No masses,  No      organomegaly. Extremities:   Trace upper extremity edema, no BLE edema. Vascular:   Pulses - 2+ radial.2+ Rt DP, 1+ left dp   Skin:   Skin color normal. No rashes or lesions   Neurologic:   Alert, oriented, GORDON. Telemetry: afib    ECG: NSR  Data Review:     Radiology:   XR Results (most recent):  Results from Hospital Encounter encounter on 02/27/20   XR CHEST PA LAT    Narrative Chest 2 views dated 2/27/2020. History is shortness of breath    AP and lateral views of the chest were obtained. The cardiac silhouette is  enlarged. There is abnormal prominence of the pulmonary interstitial markings  (particularly at the lung bases). Developing congestive change must be  considered. There is blunting of the left costophrenic angle. There is  prominence of the minor fissure on the right. This is compatible with the  presence of bilateral pleural effusions. There is obliteration of the left  hemidiaphragm. Superimposed parenchymal disease at the left lung base cannot be  excluded. Impression IMPRESSION: Expiratory examination of chest with findings suggestive of  developing, mild congestive change. Presence of bilateral pleural effusions. Left basilar atelectasis/infiltration cannot be excluded. Recent Labs     02/27/20  1430   TROIQ <0.05     Recent Labs     02/28/20  0501 02/27/20  1430    137   K 3.8 3.8    107   CO2 21 23   BUN 22* 21*   CREA 1.54* 1.49*   GLU 90 117*   CA 8.1* 8.5     Recent Labs     02/27/20  1430   WBC 9.0   HGB 13.5   HCT 40.4        Recent Labs     02/28/20  0501 02/27/20  1430   SGOT 22 22   * 219*     Recent Labs     02/28/20  0501   CHOL 133   LDLC 37.8     Recent Labs     02/28/20  0501   TSH 3.43       Thank you very much for this referral. I appreciate the opportunity to participate in this patient's care. I will follow along with above stated plan.     Katherine Castillo Brielle Welsh NP         [facility] [address], [address] [place] [address],[address]     [facility], [facility]     [phone]    CC: Other, MD Gail

## 2020-02-28 NOTE — H&P
[address] [facility] Adult  Hospitalist Group  History and Physical    Primary Care Provider: Other, MD Gail  Date of Service:  2/27/2020    Subjective:     Rekha Jefferson is a 80 y.o. female who presents with shortness of breath, swelling of arms. Patient is accompanied with daughter. History obtained mostly from patient's daughter, Minh Brar. She reported patient had a fall 2 weeks ago and she was admitted in [facility] in [place], where she was evaluated for fall. She also had kidney functions abnormal in the hospital there and was on IV diuretics. Patient was seen by nephrologist in the hospital and was continued on diuretic pill on discharge. Daughter reports that but in discharge medication she could not find any diuretic. After her discharge she saw PCP in [place] and also nephrologist.  But gradually her swelling in both legs and arms increased and today she was very short of breath with wheezing. Therefore they decided to come to the [facility] ER. Patient has past medical history of paroxysmal A. fib on Lopressor 50 mg twice daily and Eliquis on 2.5 mg twice daily, CKD 3, GERD, hypertension on hydralazine 25 mg 3 times daily, amlodipine 10 mg daily, history of anxiety on venlafaxine 75 mg daily, history of TIA on aspirin 81 mg daily and statin which patient's daughter does not remember which statin she was on. Review of Systems:    A comprehensive review of systems was negative except for that written in the History of Present Illness.      Past Medical History:   Diagnosis Date    A-fib [facility])     Bilateral cataracts     Bronchitis     CKD (chronic kidney disease)     GERD (gastroesophageal reflux disease)     HTN (hypertension)     Hyperlipidemia     Insomnia     Stuttering       Past Surgical History:   Procedure Laterality Date    HX CAROTID ENDARTERECTOMY      HX HYSTERECTOMY      HX TONSILLECTOMY       Prior to Admission medications    Not on File     Allergies Allergen Reactions    Keflex [Cephalexin] Hives      History reviewed. No pertinent family history. SOCIAL HISTORY:  Patient resides at Home. Patient ambulates with walking. Smoking history: never  Alcohol history: none        Objective:       Physical Exam:   Visit Vitals  /63   Pulse 71   Temp 98 °F (36.7 °C)   Resp 15   Ht 5' (1.524 m)   Wt 68.5 kg (151 lb)   SpO2 94%   BMI 29.49 kg/m²     General appearance: alert, cooperative, no distress, appears stated age  Head: Normocephalic, without obvious abnormality, atraumatic  Eyes: negative  Neck: no carotid bruit, difficult to assess JVD due to body habitus  Lungs: wheezes R base, L base, diminished breath sounds R base  Heart: regular rate and rhythm, S1, S2 normal, no murmur, click, rub or gallop  Abdomen: soft, non-tender. Bowel sounds normal. No masses,  no organomegaly  Extremities: Edema Upper Extremity B/L+  Pulses: 2+ and symmetric  Skin: Skin color, texture, turgor normal. No rashes or lesions  Neurologic: Grossly normal  Cap refill: Brisk, less than 3 seconds  Pulses: 2+, symmetric in all extremities    ECG:  Normal sinus rhythm with sinus arrhythmia    Data Review: All diagnostic labs and studies have been reviewed. Xr Chest Pa Lat    Result Date: 2/27/2020  IMPRESSION: Expiratory examination of chest with findings suggestive of developing, mild congestive change. Presence of bilateral pleural effusions. Left basilar atelectasis/infiltration cannot be excluded.         Assessment:     Active Problems:    Heart failure ([address] (2/27/2020)        Plan:     Dyspnea, upper extremity swelling likely secondary to CHF exacerbation, unable to do to determine the type of heart failure due to unavailability of echo at the time of admission  Elevated proBNP, Rales in the lungs bases  Monitor on telemetry   Monitor input and output, daily weights, fluid restriction, low-salt diet  Received Bumex 1 mg IV in the ER, will give 40 mg IV Lasix in the evening today  IV Lasix 40 mg twice daily from tomorrow morning. Check echocardiogram  Consult cardiology      JIM on CKD 3  Baseline creatinine unknown  Hope to improve with diuretics  Monitor kidney functions  Avoid nephrotoxins    Accelerated hypertension  Resume home medications including hydralazine 50 mg 3 times daily, amlodipine 10 mg daily, Lopressor 50 mg twice daily  Hydralazine PRN    Paroxysmal A. Fib rate controlled   continue Lopressor and Eliquis     history of TIA  Continue aspirin and statin    History of anxiety/depression  Continue venlafaxine    GERD  Stable    Patient's Baseline: Ambulates with walking  DVT ppx: SCD  Code status: Full  Disposition: pending hospital course  Care plan discussed with patient/family and nurse.                   FUNCTIONAL STATUS PRIOR TO HOSPITALIZATION (including history of recent falls):walking    Signed By: Winston Hoover MD     February 27, 2020

## 2020-02-28 NOTE — ACP (ADVANCE CARE PLANNING)
Advance Care Planning Note    Name: Yazmin Arana  YOB: 1932  MRN: [id number]  Admission Date: 2/27/2020  4:47 PM    Date of discussion: 2/27/2020    Active Diagnoses:    Hospital Problems  Never Reviewed          Codes Class Noted POA    Heart failure ([facility] [address] ICD-10-CM: I50.9  ICD-9-CM: 428.9  2/27/2020 Unknown               These active diagnoses are of sufficient risk that focused discussion on advance care planning is indicated in order to allow the patient to thoughtfully consider personal goals of care, and if situations arise that prevent the ability to personally give input, to ensure appropriate representation of their personal desires for different levels and aggressiveness of care. Discussion:     Persons present and participating in discussion: Henrik White MD,Daughter, Cristina izquierdo    Discussion:   CPR, Cardioversion,Pacing,Pressors, Intubation,Bipap/CPAP,Antibiotics,Renal failure needing Dialysis. CODE STATUS: Full    Time Spent:     Total time spent face-to-face in education and discussion: 20 minutes.      Henrik Cervantes MD  2/27/2020  8:14 PM

## 2020-02-28 NOTE — PROGRESS NOTES
Admission Medication Reconciliation:    Information obtained from:  Pt family member (daughter), Pt, Discharge Med List  RxQuery data available¹:  YES    Comments/Recommendations: All medications/allergies have been reviewed and updated; last medication administration times were this morning. Second dose Hydralazine taken at noon. PP is an 88 YOF cc swelling in limbs and hands. Pt discharged from hospital in [place] on 2/16 and no diuretic was initiated. Pt reported feeling symptom relief after Lasix doses. Pt is not sure if she takes amlodipine 5 mg twice daily or amlodipine 10 mg twice daily. Daughter in room reported giving Pt \"2 tablets of hydralazine 3 times a day\" but she is not sure if each tab is 25 mg (each dose 50 mg) or 50 mg (each dose 100 mg). Pt raised concern that she last took vitamin D in past month but is unsure if she should RF Rx. Changes made to Prior to Admission (PTA) Medication List:   ?   Medications Added:   - Amlodipine 5 mg 1 tab BID PO  - Aspirin 81 mg 1 tab QD PO  - Eliquis 2.5 mg 1 tab BID PO  - Hydralazine 50 mg TID PO (daughter stated \"gives 2 pills 3 times a day\")  - Metoprolol tartrate 50 mg 1 tab BID PO  - Rosuvastatin 40 mg 1 tab HS PO  - Venlafaxine 75 mg ER 1 tab QD PO? Medications Changed:   - None   ? Medications Removed:   - None     ¹RxQuery pharmacy benefit data reflects medications filled and processed through the patient's insurance, however   this data does NOT capture whether the medication was picked up or is currently being taken by the patient.     Allergies:  Keflex [cephalexin]    Significant PMH/Disease States:   Past Medical History:   Diagnosis Date    A-fib ([facility] [address] [facility]     Bilateral cataracts     Bronchitis     CKD (chronic kidney disease)     GERD (gastroesophageal reflux disease)     HTN (hypertension)     Hyperlipidemia     Insomnia     Stuttering        Chief Complaint for this Admission:    Chief Complaint   Patient presents with Shortness of Breath       Prior to Admission Medications:   Prior to Admission Medications   Prescriptions Last Dose Informant Patient Reported? Taking? amLODIPine (NORVASC) 5 mg tablet 2/27/2020 at Unknown time  Yes Yes   Sig: Take 5 mg by mouth two (2) times a day. apixaban (ELIQUIS) 2.5 mg tablet 2/27/2020 at Unknown time  Yes Yes   Sig: Take 2.5 mg by mouth two (2) times a day. aspirin delayed-release 81 mg tablet 2/27/2020 at Unknown time  Yes Yes   Sig: Take 81 mg by mouth daily. hydrALAZINE (APRESOLINE) 50 mg tablet 2/27/2020 at Unknown time  Yes Yes   Sig: Take 50 mg by mouth three (3) times daily. metoprolol tartrate (LOPRESSOR) 50 mg tablet 2/27/2020 at Unknown time  Yes Yes   Sig: Take 50 mg by mouth two (2) times a day. rosuvastatin (CRESTOR) 40 mg tablet 2/26/2020 at Unknown time  Yes Yes   Sig: Take 40 mg by mouth nightly. venlafaxine-SR (EFFEXOR-XR) 75 mg capsule 2/27/2020 at Unknown time  Yes Yes   Sig: Take 75 mg by mouth daily. Facility-Administered Medications: None           Thank you for allowing pharmacy to participate in the coordination of this patient's care. If you have any other questions, please contact the medication reconciliation pharmacist at x [phone]. Edelmira Keller Pharm.  D. Candidate 2022

## 2020-02-28 NOTE — ROUTINE PROCESS
Bedside and Verbal shift change report given to Julio Canas (oncoming nurse) by Brodie Mcneill (offgoing nurse). Report included the following information SBAR, Kardex, Procedure Summary, Intake/Output and Cardiac Rhythm NSA test result.   
 
Pending release of information from [facility]3] in [facility]

## 2020-02-28 NOTE — PROGRESS NOTES
Problem: Falls - Risk of  Goal: *Absence of Falls  Description  Document Suzan Hayes Fall Risk and appropriate interventions in the flowsheet.   Outcome: Progressing Towards Goal  Note: Fall Risk Interventions:  Mobility Interventions: Patient to call before getting OOB         Medication Interventions: Patient to call before getting OOB    Elimination Interventions: Call light in reach, Toileting schedule/hourly rounds, Patient to call for help with toileting needs    History of Falls Interventions: Room close to nurse's station         Problem: Patient Education: Go to Patient Education Activity  Goal: Patient/Family Education  Outcome: Progressing Towards Goal

## 2020-02-28 NOTE — PROGRESS NOTES
02/28/20 1150 02/28/20 1152 02/28/20 1153   RT Walking Oximetry   Stage Resting (Room Air) During Walk (Room Air) During Walk (Room Air)   SpO2 92 % 91 % 92 %   HR 69 bpm 74 bpm 72 bpm   Rate of Dyspnea 0 0 0      02/28/20 1154 02/28/20 1155 02/28/20 1156   RT Walking Oximetry   Stage During Walk (Room Air) During Walk [employer]) During Walk (Room Air)   SpO2 91 % 92 % 93 %   HR 73 bpm 76 bpm 80 bpm   Rate of Dyspnea 1 1 1      02/28/20 1159   RT Walking Oximetry   Stage After Walk   SpO2 95 %   HR 65 bpm   Rate of Dyspnea 1     Permission given from RN to walk patient in al for O2 need. Patient tolerated walk well ending rr 26.  Pt stated she was mildly sob

## 2020-02-28 NOTE — PROGRESS NOTES
Problem: Mobility Impaired (Adult and Pediatric)  Goal: *Acute Goals and Plan of Care (Insert Text)  Description  FUNCTIONAL STATUS PRIOR TO ADMISSION: Patient was modified independent using a single point cane for functional mobility. Fall 3 weeks ago, now lives in [place] with her daughter. Drives.  1x/week - treadmill, arm weights. No home oxygen. HOME SUPPORT PRIOR TO ADMISSION: The patient lived with her daughter following fall 3 weeks ago. Physical Therapy Goals  Initiated 2/28/2020  1. Patient will transfer from bed to chair and chair to bed with supervision/set-up using the least restrictive device within 7 day(s). 2.  Patient will perform sit to stand with supervision/set-up within 7 day(s). 3.  Patient will ambulate with supervision/set-up for 200 feet with the least restrictive device within 7 day(s). 4.  Patient will ascend/descend 4 stairs with handrail(s) with supervision/set-up within 7 day(s). 5.  Patient will participate in a 6 MWT for her diagnosis of heart failure within 48 hours of discharge. 6.  Patient will verbally and functionally recall 3 pacing/energy conservation strategies to implement with functional tasks/mobility within 7 days. Outcome: Progressing Towards Goal    PHYSICAL THERAPY EVALUATION  Patient: Vicente Mabry (76 y.o. female)  Date: 2/28/2020  Primary Diagnosis: Heart failure ([facility] [address] [I50.9]  Precautions: Fall      ASSESSMENT:  Based on the objective data described below, the patient presents with all-extremity edema, exertional dyspnea, decreased activity tolerance/impaired cardiopulmonary tolerance, and slightly decreased dynamic standing balance following admission for heart failure (now being diuresed). Patient required largely contact guard assistance and her single point cane support for hallway ambulation. Educated patient on pursed-lip breathing utilization, energy conservation techniques, and breath synchronization with exercises. Patient participated in standing LE strengthening exercises while holding onto hallway railing - mini squats x 8, marches with 2 sec hold, hip abduction toe taps x 10, and hip extension toe taps x 10 (contact guard assist). Patient will need to complete a 6 MWT within 48 hours of discharge. Continue to recommend mobilization into hallway BID with nursing staff/PCT (gait belt, SPC, A x 1). Current Level of Function Impacting Discharge (mobility/balance): CGA    Functional Outcome Measure: The patient scored 19/28 on the Tinetti outcome measure which is indicative of a high fall risk. Other factors to consider for discharge: 24/7 support from daughter     Patient will benefit from skilled therapy intervention to address the above noted impairments. PLAN :  Recommendations and Planned Interventions: transfer training, gait training, therapeutic exercises, edema management/control, patient and family training/education, and therapeutic activities      Frequency/Duration: Patient will be followed by physical therapy:  5 times a week to address goals. Recommendation for discharge: (in order for the patient to meet his/her long term goals)  HHPT x 1-2 weeks    This discharge recommendation:  Has not yet been discussed the attending provider and/or case management    IF patient discharges home will need the following DME: none         SUBJECTIVE:   Patient stated I used to be able to 'walk the line' without any help.     OBJECTIVE DATA SUMMARY:   HISTORY:    Past Medical History:   Diagnosis Date    A-fib ([address]     Bilateral cataracts     Bronchitis     CKD (chronic kidney disease)     GERD (gastroesophageal reflux disease)     HTN (hypertension)     Hyperlipidemia     Insomnia     Stuttering      Past Surgical History:   Procedure Laterality Date    HX CAROTID ENDARTERECTOMY      HX HYSTERECTOMY      HX TONSILLECTOMY         Personal factors and/or comorbidities impacting plan of care: [address] [place] [address] Dr GANNON Situation  Home Environment: Private residence  One/Two Story Residence: Two story, live on 1st floor  Living Alone: No  Support Systems: Child(liliana)(Daughter)  Patient Expects to be Discharged to[de-identified] Private residence  Current DME Used/Available at Home: Constancia Grumbling, straight  Tub or Shower Type: Shower    EXAMINATION/PRESENTATION/DECISION MAKING:   Critical Behavior:  Neurologic State: Alert, Appropriate for age           Hearing: Auditory  Auditory Impairment: None    Range Of Motion:  AROM: Generally decreased, functional           PROM: Generally decreased, functional           Strength:    Strength: Generally decreased, functional                    Tone & Sensation:   Tone: Normal              Sensation: Intact               Coordination:  Coordination: Generally decreased, functional    Functional Mobility:  Bed Mobility:     Supine to Sit: Modified independent  Sit to Supine: Modified independent  Scooting: Modified independent  Transfers:  Sit to Stand: Contact guard assistance; Additional time; Adaptive equipment  Stand to Sit: Contact guard assistance; Additional time; Adaptive equipment     Balance:   Sitting: Intact; Without support  Standing: Impaired; With support  Standing - Static: Good;Constant support  Standing - Dynamic : Fair;Constant support  Ambulation/Gait Training:  Distance (ft): 100 Feet (ft)  Assistive Device: Gait belt;Cane, straight  Ambulation - Level of Assistance: Contact guard assistance; Adaptive equipment     Gait Description (WDL): Exceptions to WDL  Gait Abnormalities: Decreased step clearance; Path deviations        Base of Support: Widened     Speed/Rosario: Slow  Step Length: Right shortened;Left shortened    Functional Measure:  Tinetti test:    Sitting Balance: 1  Arises: 1  Attempts to Rise: 2  Immediate Standing Balance: 1  Standing Balance: 1  Nudged: 1  Eyes Closed: 0  Turn 360 Degrees - Continuous/Discontinuous: 1  Turn 360 Degrees - Steady/Unsteady: 1  Sitting Down: 1  Balance Score: 10 Balance total score  Indication of Gait: 1  R Step Length/Height: 1  L Step Length/Height: 1  R Foot Clearance: 1  L Foot Clearance: 1  Step Symmetry: 1  Step Continuity: 1  Path: 1  Trunk: 1  Walking Time: 0  Gait Score: 9 Gait total score  Total Score: 19/28 Overall total score         Tinetti Tool Score Risk of Falls  <19 = High Fall Risk  19-24 = Moderate Fall Risk  25-28 = Low Fall Risk  Tinetti ME. Performance-Oriented Assessment of Mobility Problems in Elderly Patients. [facility]; [id number]. (Scoring Description: PT Bulletin Feb. 10, 1993)    Older adults: Jon Hillman et al, 2009; n = 1000 [facility] elderly evaluated with ABC, ABELRADO, ADL, and IADL)  · Mean ABELARDO score for males aged 69-68 years = 26.21(3.40)  · Mean ABELARDO score for females age 69-68 years = 25.16(4.30)  · Mean ABELARDO score for males over 80 years = 23.29(6.02)  · Mean ABELARDO score for females over 80 years = 17.20(8.32)     Based on the above components, the patient evaluation is determined to be of the following complexity level: LOW     Activity Tolerance:   Fair, SpO2 91-93% on room air. Please refer to the flowsheet for vital signs taken during this treatment. After treatment patient left in no apparent distress:   Supine in bed, Call bell within reach, Caregiver / family present, and Side rails x 3    COMMUNICATION/EDUCATION:   The patients plan of care was discussed with: Registered Nurse. Fall prevention education was provided and the patient/caregiver indicated understanding., Patient/family have participated as able in goal setting and plan of care. , and Patient/family agree to work toward stated goals and plan of care.     Thank you for this referral.  Lizette Chawla, PT, DPT   Time Calculation: 25 mins

## 2020-02-28 NOTE — NURSE NAVIGATOR
Chart reviewed by Heart Failure Nurse Navigator. Heart Failure database completed. EF:  60/65    ACEi/ARB/ARNi: not currently indicated    BB: not currently indicated    Aldosterone Antagonist: not currently indicated    Obstructive Sleep Apnea Screening:   STOP-BANG score:   Referred to Sleep Medicine:     CRT not currently indicated. NYHA Functional Class IV on admission      Heart Failure Teach Back in Patient Education. Heart Failure Avoiding Triggers on Discharge Instructions. Cardiologist: Dr. Buck Boast ([facility])    Post discharge follow up phone call to be made within 48-72 hours of discharge.       MEDICARE HF BUNDLE ACTIVE

## 2020-02-28 NOTE — ROUTINE PROCESS
TRANSFER - OUT REPORT: 
 
Verbal report given to Jessica Malik (name) on Nessa Mendoza  being transferred to [facility] (unit) for routine progression of care Report consisted of patients Situation, Background, Assessment and  
Recommendations(SBAR). Information from the following report(s) SBAR, ED Summary, [facility] and Recent Results was reviewed with the receiving nurse. Lines:  
Peripheral IV 02/27/20 Left Antecubital (Active) Site Assessment Clean, dry, & intact 2/27/2020  2:35 PM  
Phlebitis Assessment 0 2/27/2020  2:35 PM  
Infiltration Assessment 0 2/27/2020  2:35 PM  
Dressing Status Clean, dry, & intact 2/27/2020  2:35 PM  
Dressing Type Transparent 2/27/2020  2:35 PM  
Hub Color/Line Status Pink 2/27/2020  2:35 PM  
Alcohol Cap Used No 2/27/2020  2:35 PM  
  
 
Opportunity for questions and clarification was provided.

## 2020-02-28 NOTE — PHYSICIAN ADVISORY
Letter of Status Determination:  
Recommend hospitalization status upgraded from INPATIENT  to INPATIENT  Status Pt Name:  Aristides Gomez MR#  
BISI # [id number] / 
[id number] Payor: Eusebio Pierson / Plan: [address] / Product Type: Medicare /   
RIKKI#  [id number] Room and Hospital  444/01  @ [address]  
Hospitalization date  2/27/2020  4:47 PM  
Current Attending Physician  Marlene Tubbs I* Principal diagnosis  Heart failure ([address] [I50.9] Clinicals  80 y.o. y.o  female hospitalized with above diagnosis Dyspnea, upper extremity swelling likely secondary to CHF exacerbation, unable to do to determine the type of heart failure due to unavailability of echo at the time of admission Elevated proBNP, Rales in the lungs bases Monitor on telemetry Monitor input and output, daily weights, fluid restriction, low-salt diet Received Bumex 1 mg IV in the ER, will give 40 mg IV Lasix in the evening today IV Lasix 40 mg twice daily from tomorrow morning. Check echocardiogram 
  
[facility]) criteria Does  NOT  apply STATUS DETERMINATION  KEEP IP Additional comments Payor: Eusebio Pierson / Plan: [address] / Product Type: Medicare /   
  
 
 
Otoniel Morales [address] [place] [address] T: [phone]    [email or web address]

## 2020-02-29 VITALS
TEMPERATURE: 98.1 F | HEART RATE: 75 BPM | WEIGHT: 163.8 LBS | SYSTOLIC BLOOD PRESSURE: 169 MMHG | DIASTOLIC BLOOD PRESSURE: 59 MMHG | BODY MASS INDEX: 32.16 KG/M2 | HEIGHT: 60 IN | RESPIRATION RATE: 16 BRPM | OXYGEN SATURATION: 93 %

## 2020-02-29 PROBLEM — I10 HTN (HYPERTENSION): Status: ACTIVE | Noted: 2020-02-29

## 2020-02-29 PROBLEM — R60.0 EDEMA OF UPPER EXTREMITY: Status: ACTIVE | Noted: 2020-02-29

## 2020-02-29 PROBLEM — I48.0 PAROXYSMAL ATRIAL FIBRILLATION (HCC): Status: ACTIVE | Noted: 2020-02-29

## 2020-02-29 PROBLEM — N18.9 CKD (CHRONIC KIDNEY DISEASE): Status: ACTIVE | Noted: 2020-02-29

## 2020-02-29 LAB
ANION GAP SERPL CALC-SCNC: 9 MMOL/L (ref 5–15)
BASOPHILS # BLD: 0.1 K/UL (ref 0–0.1)
BASOPHILS NFR BLD: 1 % (ref 0–1)
BUN SERPL-MCNC: 24 MG/DL (ref 6–20)
BUN/CREAT SERPL: 14 (ref 12–20)
CALCIUM SERPL-MCNC: 8.1 MG/DL (ref 8.5–10.1)
CHLORIDE SERPL-SCNC: 108 MMOL/L (ref 97–108)
CO2 SERPL-SCNC: 20 MMOL/L (ref 21–32)
CREAT SERPL-MCNC: 1.7 MG/DL (ref 0.55–1.02)
DIFFERENTIAL METHOD BLD: ABNORMAL
EOSINOPHIL # BLD: 0.3 K/UL (ref 0–0.4)
EOSINOPHIL NFR BLD: 4 % (ref 0–7)
ERYTHROCYTE [DISTWIDTH] IN BLOOD BY AUTOMATED COUNT: 12.8 % (ref 11.5–14.5)
GLUCOSE SERPL-MCNC: 86 MG/DL (ref 65–100)
HCT VFR BLD AUTO: 34.2 % (ref 35–47)
HGB BLD-MCNC: 11.2 G/DL (ref 11.5–16)
IMM GRANULOCYTES # BLD AUTO: 0 K/UL (ref 0–0.04)
IMM GRANULOCYTES NFR BLD AUTO: 0 % (ref 0–0.5)
LYMPHOCYTES # BLD: 1.9 K/UL (ref 0.8–3.5)
LYMPHOCYTES NFR BLD: 27 % (ref 12–49)
MAGNESIUM SERPL-MCNC: 1.6 MG/DL (ref 1.6–2.4)
MCH RBC QN AUTO: 29.9 PG (ref 26–34)
MCHC RBC AUTO-ENTMCNC: 32.7 G/DL (ref 30–36.5)
MCV RBC AUTO: 91.2 FL (ref 80–99)
MONOCYTES # BLD: 1 K/UL (ref 0–1)
MONOCYTES NFR BLD: 13 % (ref 5–13)
NEUTS SEG # BLD: 4 K/UL (ref 1.8–8)
NEUTS SEG NFR BLD: 55 % (ref 32–75)
NRBC # BLD: 0 K/UL (ref 0–0.01)
NRBC BLD-RTO: 0 PER 100 WBC
PHOSPHATE SERPL-MCNC: 4.5 MG/DL (ref 2.6–4.7)
PLATELET # BLD AUTO: 310 K/UL (ref 150–400)
PMV BLD AUTO: 10.1 FL (ref 8.9–12.9)
POTASSIUM SERPL-SCNC: 3.2 MMOL/L (ref 3.5–5.1)
RBC # BLD AUTO: 3.75 M/UL (ref 3.8–5.2)
SODIUM SERPL-SCNC: 137 MMOL/L (ref 136–145)
WBC # BLD AUTO: 7.3 K/UL (ref 3.6–11)

## 2020-02-29 PROCEDURE — 74011250637 HC RX REV CODE- 250/637: Performed by: INTERNAL MEDICINE

## 2020-02-29 PROCEDURE — 74011250637 HC RX REV CODE- 250/637: Performed by: NURSE PRACTITIONER

## 2020-02-29 PROCEDURE — 84100 ASSAY OF PHOSPHORUS: CPT

## 2020-02-29 PROCEDURE — 83735 ASSAY OF MAGNESIUM: CPT

## 2020-02-29 PROCEDURE — 36415 COLL VENOUS BLD VENIPUNCTURE: CPT

## 2020-02-29 PROCEDURE — 85025 COMPLETE CBC W/AUTO DIFF WBC: CPT

## 2020-02-29 PROCEDURE — 80048 BASIC METABOLIC PNL TOTAL CA: CPT

## 2020-02-29 RX ORDER — FUROSEMIDE 40 MG/1
40 TABLET ORAL DAILY
Status: DISCONTINUED | OUTPATIENT
Start: 2020-02-29 | End: 2020-02-29 | Stop reason: HOSPADM

## 2020-02-29 RX ORDER — FUROSEMIDE 40 MG/1
40 TABLET ORAL DAILY
Qty: 30 TAB | Refills: 0 | Status: SHIPPED | OUTPATIENT
Start: 2020-02-29 | End: 2020-03-04

## 2020-02-29 RX ORDER — CARVEDILOL 12.5 MG/1
12.5 TABLET ORAL 2 TIMES DAILY WITH MEALS
Qty: 60 TAB | Refills: 0 | Status: SHIPPED | OUTPATIENT
Start: 2020-02-29 | End: 2020-03-04

## 2020-02-29 RX ORDER — POTASSIUM CHLORIDE 750 MG/1
40 TABLET, FILM COATED, EXTENDED RELEASE ORAL
Status: COMPLETED | OUTPATIENT
Start: 2020-02-29 | End: 2020-02-29

## 2020-02-29 RX ADMIN — AMLODIPINE BESYLATE 10 MG: 5 TABLET ORAL at 08:50

## 2020-02-29 RX ADMIN — POTASSIUM CHLORIDE 40 MEQ: 750 TABLET, FILM COATED, EXTENDED RELEASE ORAL at 08:50

## 2020-02-29 RX ADMIN — VENLAFAXINE HYDROCHLORIDE 75 MG: 37.5 CAPSULE, EXTENDED RELEASE ORAL at 08:50

## 2020-02-29 RX ADMIN — APIXABAN 2.5 MG: 2.5 TABLET, FILM COATED ORAL at 08:52

## 2020-02-29 RX ADMIN — CARVEDILOL 12.5 MG: 12.5 TABLET, FILM COATED ORAL at 08:52

## 2020-02-29 RX ADMIN — Medication 10 ML: at 05:56

## 2020-02-29 RX ADMIN — FUROSEMIDE 40 MG: 40 TABLET ORAL at 08:50

## 2020-02-29 RX ADMIN — ASPIRIN 81 MG: 81 TABLET, COATED ORAL at 08:52

## 2020-02-29 NOTE — PROGRESS NOTES
Problem: Falls - Risk of  Goal: *Absence of Falls  Description  Document Edda Brennangemini Fall Risk and appropriate interventions in the flowsheet.   Outcome: Progressing Towards Goal  Note: Fall Risk Interventions:  Mobility Interventions: Patient to call before getting OOB         Medication Interventions: Teach patient to arise slowly, Patient to call before getting OOB    Elimination Interventions: Patient to call for help with toileting needs, Call light in reach    History of Falls Interventions: Room close to nurse's station         Problem: Heart Failure: Day 1  Goal: Activity/Safety  Outcome: Progressing Towards Goal

## 2020-02-29 NOTE — DISCHARGE SUMMARY
Discharge Summary       PATIENT ID: Hope Smallwood  MRN: [id number]   YOB: 1932    DATE OF ADMISSION: 2/27/2020  4:47 PM    DATE OF DISCHARGE: 02/29/2020   PRIMARY CARE PROVIDER: David Martínez MD     DISCHARGING PROVIDER: Lisa Gonsales MD    To contact this individual call [phone] and ask the  to page. If unavailable ask to be transferred the Adult Hospitalist Department. CONSULTATIONS: IP CONSULT TO CARDIOLOGY    PROCEDURES/SURGERIES: * No surgery found *    ADMITTING DIAGNOSES & HOSPITAL COURSE:   Dyspnea, secondary to acute on chronic diastolic CHF      Per admitting H and P   Samuel  a 80 y. o. female who presents with shortness of breath, swelling of arms.  Patient is accompanied with daughter. Terri Led obtained mostly from patient's daughter, Cristina.  She reported patient had a fall 2 weeks ago and she was admitted in [facility] in [place], where she was evaluated for fall.  She also had kidney functions abnormal in the hospital there and was on IV diuretics.  Patient was seen by nephrologist in the hospital and was continued on diuretic pill on discharge. Adolph Escalera reports that but in discharge medication she could not find any diuretic.  After her discharge she saw PCP in [place] and also nephrologist. Vito Portillo gradually her swelling in both legs and arms increased and today she was very short of breath with wheezing.  Therefore they decided to come to the [facility] ER. Admitted and underwent IV diuresis with improvement in SOB, and UE swelling. Cardiology was consulted, who made some changes to home BP medications. Patient to be discharged on oral lasix. Follow up with Dr. Hyla Burkitt or with her usual cardiologist. She has been instructed to continue weighing herself daily. She will also need a BMP in about 1 week. UE doppler was negative for DVT.     DISCHARGE DIAGNOSES / PLAN:      Dyspnea, upper extremity swelling likely secondary to CHF exacerbation, diastolic, NYHA class IV on admission, class II-III on discharge. Elevated proBNP, Rales in the lungs bases  Monitor on telemetry   Monitor input and output, daily weights, fluid restriction, low-salt diet  Transition to PO lasix today, and plan to DC home with close PCP follow up. Reviewed records from prior hospital, she was not prescribed a diuretic for home. Cr on discharge was 1.7  Echocardiogram with diastolic failure  Cardiology following, appreciate assistance. UE doppler negative.      JIM on CKD 3  Baseline creatinine unknown  Monitor kidney functions`  Avoid nephrotoxins  Last Cr from discharge was 1.7, will need close follow up with PCP. BMP in 1 week      Accelerated hypertension  Resume home medications including hydralazine 50 mg 3 times daily, amlodipine 10 mg daily, Lopressor 50 mg twice daily  Hydralazine PRN     Paroxysmal A. Fib rate controlled   continue Lopressor and Eliquis      history of TIA  Continue aspirin and statin     History of anxiety/depression  Continue venlafaxine     GERD  Stable        ADDITIONAL CARE RECOMMENDATIONS:   - Please take all medications as prescribed. Note changes as below. ** Add a diuretics medication, will start lasix daily. This may need to be uptitrated by your physician. Please make sure to closely follow up with your primary care physician within 1 week. ** Hold your hydralazine, and stop your metoprolol  ** start coreg 12.5mg twice per day   - Please make sure to follow up with your primary care physician within 1-2 weeks of discharge for hospital follow up. You should also call your cardiologist and make an appointment for follow up within 1 week of discharge. - It will be important to measure your weight every day. If you see yourself gaining about 1-2 lbs of weight within 24-48 hours, this is from fluid. You should at that point take an extra fluid pull.  You may discuss this plan with your primary physician or your cardiologist. - Please get up slowly from a seated or laying position, avoid falls. - Avoid tobacco, alcohol and other illicit drug use. PENDING TEST RESULTS:   At the time of discharge the following test results are still pending: None    FOLLOW UP APPOINTMENTS:    Follow-up Information     Follow up With Specialties Details Why Contact Info    Primary care physician         Call your cardiologist                  DIET: Cardiac Diet    ACTIVITY: Activity as tolerated    WOUND CARE: None    EQUIPMENT needed: None      DISCHARGE MEDICATIONS:  Current Discharge Medication List      START taking these medications    Details   carvediloL (COREG) 12.5 mg tablet Take 1 Tab by mouth two (2) times daily (with meals) for 30 days. Qty: 60 Tab, Refills: 0      furosemide (LASIX) 40 mg tablet Take 1 Tab by mouth daily. Qty: 30 Tab, Refills: 0         CONTINUE these medications which have NOT CHANGED    Details   amLODIPine (NORVASC) 5 mg tablet Take 5 mg by mouth two (2) times a day. apixaban (ELIQUIS) 2.5 mg tablet Take 2.5 mg by mouth two (2) times a day. aspirin delayed-release 81 mg tablet Take 81 mg by mouth daily. rosuvastatin (CRESTOR) 40 mg tablet Take 40 mg by mouth nightly. venlafaxine-SR (EFFEXOR-XR) 75 mg capsule Take 75 mg by mouth daily. STOP taking these medications       metoprolol tartrate (LOPRESSOR) 50 mg tablet Comments:   Reason for Stopping:         hydrALAZINE (APRESOLINE) 50 mg tablet Comments:   Reason for Stopping:                 NOTIFY YOUR PHYSICIAN FOR ANY OF THE FOLLOWING:   Fever over 101 degrees for 24 hours. Chest pain, shortness of breath, fever, chills, nausea, vomiting, diarrhea, change in mentation, falling, weakness, bleeding. Severe pain or pain not relieved by medications. Or, any other signs or symptoms that you may have questions about.     DISPOSITION:  X  Home With:   OT  PT  HH  RN       Long term SNF/Inpatient Rehab    Independent/assisted living Hospice    Other:       PATIENT CONDITION AT DISCHARGE:     Functional status    Poor     Deconditioned    X Independent      Cognition    X Lucid     Forgetful     Dementia      Catheters/lines (plus indication)    Funes     PICC     PEG    X None      Code status   X  Full code     DNR      PHYSICAL EXAMINATION AT DISCHARGE:  Visit Vitals  /59 (BP 1 Location: Right arm, BP Patient Position: At rest)   Pulse 75   Temp 98.1 °F (36.7 °C)   Resp 16   Ht 5' (1.524 m)   Wt 74.3 kg (163 lb 12.8 oz)   SpO2 93%   Breastfeeding No   BMI 31.99 kg/m²     Gen: NAD, awake in bed  HEENT: NC/AT, sclera anicteric, PERRL, EOMI  CV: RRR no m/r/g  Resp: CTA b/l no increased work of breathing  Abd: NT/ND, normal bowel sounds  Ext: 2+ pulses, 1+ edema R> L UE, no LE edema   Skin: No rashes      CHRONIC MEDICAL DIAGNOSES:  Problem List as of 2/29/2020 Date Reviewed: 2/29/2020          Codes Class Noted - Resolved    Edema of upper extremity ICD-10-CM: R60.0  ICD-9-CM: 782.3  2/29/2020 - Present        CKD (chronic kidney disease) ICD-10-CM: N18.9  ICD-9-CM: 585.9  2/29/2020 - Present        Paroxysmal atrial fibrillation (HCC) ICD-10-CM: I48.0  ICD-9-CM: 427.31  2/29/2020 - Present        HTN (hypertension) ICD-10-CM: I10  ICD-9-CM: 401.9  2/29/2020 - Present        Heart failure ([facility] [address] ICD-10-CM: I50.9  ICD-9-CM: 428.9  2/27/2020 - Present              Greater than 30 minutes were spent with the patient on counseling and coordination of care    Signed:   Nima Alaniz MD  2/29/2020  9:00 AM

## 2020-02-29 NOTE — PROGRESS NOTES
[address] [place] [address]               Division of Cardiology  [phone]                       Progress note              Lindsay Dominique M.D., F.A.CCherylC. NAME:  Kimberly Ho   :   1932   MRN:   [id number]         ICD-10-CM ICD-9-CM    1. PERES (dyspnea on exertion) R06.09 786.09    2. Acute systolic congestive heart failure (HCC) I50.21 428.21      428.0            Assessment/Plan: She feels much better today she tells me. Shortness of breath improved. No chest pain reported. Still some edema mostly in the upper extremities. 1.  Diastolic CHF: Likely related to hypertension as well as age. Chest x-ray noted. Continue with p.o. Lasix. Symptomatically better. Control blood pressure. 2.  CKD: Follow creatinine defer to primary care physician. 3.  Hypertension: Still somewhat elevated. Continue amlodipine. Coreg was added yesterday instead of Lopressor. She will need close follow-up for her blood pressure since she may require additional intervention from medication standpoint but since the changes were made less than 24 hours ago no additional changes for today. 4.  History of paroxysmal atrial fibrillation: In atrial fibrillation rate is controlled. Continue Eliquis 2.5 mg twice daily adjusted for age and creatinine. 5.  Hyperlipidemia: Continue Crestor. 6.  TIA: Continue statin and aspirin. I will continue to follow from a distance and will will see back on Monday if the patient still here. Otherwise she will need close follow-up either with primary care physician or cardiology within 3 to 5 days post discharge. 20   ECHO ADULT COMPLETE 2020    Narrative · Normal cavity size, systolic function (ejection fraction normal) and   diastolic function. Moderate concentric hypertrophy. Estimated left   ventricular ejection fraction is 60 - 65%.  No regional wall motion abnormality noted. Normal left ventricular strain. · Small circumferential pericardial effusion. · Image quality for this study was suboptimal.  · Moderately dilated left atrium. · Mild tricuspid valve regurgitation is present. · Mild pulmonary hypertension. Signed by: Rg Guzman MD            EKG Results     Procedure 720 Value Units Date/Time    EKG 12 LEAD INITIAL [[id number]] Collected:  02/27/20 1806    Order Status:  Completed Updated:  02/28/20 1652     Ventricular Rate 71 BPM      Atrial Rate 71 BPM      P-R Interval 154 ms      QRS Duration 78 ms      Q-T Interval 412 ms      QTC Calculation (Bezet) 447 ms      Calculated P Axis 27 degrees      Calculated R Axis 13 degrees      Calculated T Axis 11 degrees      Diagnosis --     Normal sinus rhythm with sinus arrhythmia  No previous ECGs available  Confirmed by Nuzhat Olivares MD, Tomy Mendez ([id number]) on 2/28/2020 4:51:46 PM              Visit Vitals  /59 (BP 1 Location: Right arm, BP Patient Position: At rest)   Pulse 75   Temp 98.1 °F (36.7 °C)   Resp 16   Ht 5' (1.524 m)   Wt 163 lb 12.8 oz (74.3 kg)   SpO2 93%   Breastfeeding No   BMI 31.99 kg/m²     Wt Readings from Last 3 Encounters:   02/29/20 163 lb 12.8 oz (74.3 kg)         Review of Systems:   Pertinent items are noted in the History of Present Illness. Objective:       02/29 0701 - 02/29 1900  In: 120 [P.O.:120]  Out: -     02/27 1901 - 02/29 0700  In: 200 [P.O.:770]  Out: -       Telemetry: AFIB    Physical Exam:    Neck: no carotid bruit and no JVD  Heart: irregularly irregular rhythm  Lungs: diminished breath sounds R base, L base  Abdomen: soft, non-tender.  Bowel sounds normal. No masses,  no organomegaly  Extremities: no edema    Current Facility-Administered Medications   Medication Dose Route Frequency    furosemide (LASIX) tablet 40 mg  40 mg Oral DAILY    carvediloL (COREG) tablet 12.5 mg  12.5 mg Oral BID WITH MEALS    docusate sodium (COLACE) capsule 100 mg  100 mg Oral BID PRN    ondansetron (ZOFRAN) injection 4 mg  4 mg IntraVENous Q6H PRN    amLODIPine (NORVASC) tablet 10 mg  10 mg Oral DAILY    apixaban (ELIQUIS) tablet 2.5 mg  2.5 mg Oral BID    aspirin delayed-release tablet 81 mg  81 mg Oral DAILY    venlafaxine-SR (EFFEXOR-XR) capsule 75 mg  75 mg Oral DAILY    hydrALAZINE (APRESOLINE) 20 mg/mL injection 20 mg  20 mg IntraVENous Q6H PRN    sodium chloride (NS) flush 5-40 mL  5-40 mL IntraVENous Q8H    sodium chloride (NS) flush 5-40 mL  5-40 mL IntraVENous PRN    acetaminophen (TYLENOL) tablet 650 mg  650 mg Oral Q4H PRN    diphenhydrAMINE (BENADRYL) injection 12.5 mg  12.5 mg IntraVENous Q4H PRN    bisacodyL (DULCOLAX) tablet 5 mg  5 mg Oral DAILY PRN    rosuvastatin (CRESTOR) tablet 40 mg  40 mg Oral QHS    albuterol-ipratropium (DUO-NEB) 2.5 MG-0.5 MG/3 ML  3 mL Nebulization Q6H PRN       Lab Results   Component Value Date/Time    WBC 7.3 02/29/2020 03:32 AM    HGB 11.2 (L) 02/29/2020 03:32 AM    HCT 34.2 (L) 02/29/2020 03:32 AM    PLATELET 349 44/27/9071 03:32 AM    MCV 91.2 02/29/2020 03:32 AM     Lab Results   Component Value Date/Time    Sodium 137 02/29/2020 03:32 AM    Potassium 3.2 (L) 02/29/2020 03:32 AM    Chloride 108 02/29/2020 03:32 AM    CO2 20 (L) 02/29/2020 03:32 AM    Anion gap 9 02/29/2020 03:32 AM    Glucose 86 02/29/2020 03:32 AM    BUN 24 (H) 02/29/2020 03:32 AM    Creatinine 1.70 (H) 02/29/2020 03:32 AM    BUN/Creatinine ratio 14 02/29/2020 03:32 AM    GFR est AA 34 (L) 02/29/2020 03:32 AM    GFR est non-AA 28 (L) 02/29/2020 03:32 AM    Calcium 8.1 (L) 02/29/2020 03:32 AM     Lab Results   Component Value Date/Time    Cholesterol, total 133 02/28/2020 05:01 AM    HDL Cholesterol 55 02/28/2020 05:01 AM    LDL, calculated 37.8 02/28/2020 05:01 AM    VLDL, calculated 40.2 02/28/2020 05:01 AM    Triglyceride 201 (H) 02/28/2020 05:01 AM    CHOL/HDL Ratio 2.4 02/28/2020 05:01 AM     Lab Results   Component Value Date/Time    Troponin-I, Qt. <0.05 02/27/2020 02:30 PM     Lab Results   Component Value Date/Time    ALT (SGPT) 16 02/28/2020 05:01 AM    AST (SGOT) 22 02/28/2020 05:01 AM    Alk.  phosphatase 180 (H) 02/28/2020 05:01 AM    Bilirubin, total 0.2 02/28/2020 05:01 AM     Lab Results   Component Value Date/Time    NT pro-BNP 3,497 (H) 02/28/2020 05:01 AM    NT pro-BNP 4,722 (H) 02/27/2020 02:30 PM

## 2020-02-29 NOTE — PROGRESS NOTES
Problem: Falls - Risk of  Goal: *Absence of Falls  Description  Document Rashaad Raza Fall Risk and appropriate interventions in the flowsheet.   Outcome: Resolved/Met  Note: Fall Risk Interventions:  Mobility Interventions: Utilize walker, cane, or other assistive device, Patient to call before getting OOB         Medication Interventions: Teach patient to arise slowly, Patient to call before getting OOB    Elimination Interventions: Call light in reach, Patient to call for help with toileting needs    History of Falls Interventions: Room close to nurse's station, Investigate reason for fall         Problem: Patient Education: Go to Patient Education Activity  Goal: Patient/Family Education  Outcome: Resolved/Met     Problem: Patient Education: Go to Patient Education Activity  Goal: Patient/Family Education  Outcome: Resolved/Met     Problem: Patient Education: Go to Patient Education Activity  Goal: Patient/Family Education  Outcome: Resolved/Met     Problem: Heart Failure: Day 1  Goal: Off Pathway (Use only if patient is Off Pathway)  Outcome: Resolved/Met  Goal: Activity/Safety  Outcome: Resolved/Met  Goal: Consults, if ordered  Outcome: Resolved/Met  Goal: Diagnostic Test/Procedures  Outcome: Resolved/Met  Goal: Nutrition/Diet  Outcome: Resolved/Met  Goal: Discharge Planning  Outcome: Resolved/Met  Goal: Medications  Outcome: Resolved/Met  Goal: Respiratory  Outcome: Resolved/Met  Goal: Treatments/Interventions/Procedures  Outcome: Resolved/Met  Goal: Psychosocial  Outcome: Resolved/Met  Goal: *Oxygen saturation within defined limits  Outcome: Resolved/Met  Goal: *Hemodynamically stable  Outcome: Resolved/Met  Goal: *Optimal pain control at patient's stated goal  Outcome: Resolved/Met  Goal: *Anxiety reduced or absent  Outcome: Resolved/Met     Problem: Heart Failure: Day 2  Goal: Off Pathway (Use only if patient is Off Pathway)  Outcome: Resolved/Met  Goal: Activity/Safety  Outcome: Resolved/Met  Goal: Diagnostic Test/Procedures  Outcome: Resolved/Met  Goal: Nutrition/Diet  Outcome: Resolved/Met  Goal: Discharge Planning  Outcome: Resolved/Met  Goal: Medications  Outcome: Resolved/Met  Goal: Respiratory  Outcome: Resolved/Met  Goal: Treatments/Interventions/Procedures  Outcome: Resolved/Met  Goal: Psychosocial  Outcome: Resolved/Met  Goal: *Oxygen saturation within defined limits  Outcome: Resolved/Met  Goal: *Hemodynamically stable  Outcome: Resolved/Met  Goal: *Optimal pain control at patient's stated goal  Outcome: Resolved/Met  Goal: *Anxiety reduced or absent  Outcome: Resolved/Met  Goal: *Demonstrates progressive activity  Outcome: Resolved/Met

## 2020-02-29 NOTE — DISCHARGE INSTRUCTIONS
Dear Hope Smallwood,    Thank you for choosing [address] [place] [address] for your healthcare needs. We strive to provide EXCELLENT care to you and your family. In an effort to explain clearly why you were here in the hospital, I've also written a very brief summary below. Other details including formal diagnosis, medication changes, and follow up appointment recommendations can also be found in this packet. You were admitted for shortness of breath due to fluid overload from diastolic CHF for which you were started on IV diuretics. You also received care from specialist physicians in the following specialties:  [address]    Here are the updates to your medication list:  ** Add a diuretics medication, will start lasix daily. This may need to be uptitrated by your physician. Please make sure to closely follow up with your primary care physician within 1 week. ** Hold your hydralazine, and stop your metoprolol  ** start coreg 12.5mg twice per day     Remember that it is important for you to take your medications exactly as they are prescribed. It is helpful to keep a list of your medication with the names, dosages, and times to be taken in your wallet. Additionally,     - Please make sure to follow up with your primary care physician within 1-2 weeks of discharge for hospital follow up. You should also call your cardiologist and make an appointment for follow up within 1 week of discharge. - It will be important to measure your weight every day. If you see yourself gaining about 1-2 lbs of weight within 24-48 hours, this is from fluid. You should at that point take an extra fluid pull. You may discuss this plan with your primary physician or your cardiologist.   - Please get up slowly from a seated or laying position, avoid falls. - Avoid tobacco, alcohol and other illicit drug use. Make sure to also see your primary care doctor for follow-up.   Bring these papers with you and be sure to review your medication list with your doctor. I cannot stress the importance of follow up enough. I've included the information for your follow-up appointments below: Follow-up Information     Follow up With Specialties Details Why Contact Info    Primary care physician         Call your cardiologist         Dana Park MD Cardiology In 3 days Please call on monday and make an appointment for fluid check and kidney function [address] [phone]            At this time, the following test results are still pending: ***  Again, please follow-up these results with your primary care provider. Should you have any fever over 101 degrees for 24 hours, chest pain, shortness of breath, fever, chills, nausea, vomiting, diarrhea, change in mentation, falling, weakness, bleeding, or worsening pain, please seek medical attention immediately. Finally, as your discharging physician, you may be receiving a survey regarding my care. I would greatly value and appreciate your input in the survey as we strive for excellence. If you have any questions, I can be reached at [phone].   Thank you so much again for allowing me to care for you at [facility].    Respectfully yours,  Keiko Martínez MD

## 2020-03-03 ENCOUNTER — PATIENT OUTREACH (OUTPATIENT)
Dept: CASE MANAGEMENT | Age: 85
End: 2020-03-03

## 2020-03-03 NOTE — PROGRESS NOTES
Hospital Discharge Follow-Up      Date/Time:  3/3/2020 10:26 AM    Claudine Ojeda RN, [facility], [facility]  Care transitions nurse [phone]  [facility] Coordination Team    Patient was admitted to [facility] on 2/27 and discharged on 2/29/2020  for HFpEF. The physician discharge summary was available at the time of outreach. Patient was contacted within 1 business days of discharge. Pt had just been in hospital in [facility] - for r/o UTI and post fall - she did not have a fluid pill when she went home -     Top Challenges reviewed with the provider   HFpEF - with presentation of dyspnea, significant edema - legs, arms, face  CKD  Paroxysmal a fib - on low dose Eliquis 2.5 bid  Recently in hospital in [facility] for UTI / fall and hitting her head  Pt with continued edema of face and arms -   Hypokalemia - note to provider - needs repeat labs in a week from d/c 2/29 - K at d/c 3.2 - Lasix 40 every day. Advance Care Planning:   Does patient have an Advance Directive:  not on file; education provided will provide information at follow up visit       Heart Failure Note    Do you have a Scale:    yes   How often do you weigh:  q am - weight today 161/ weight yesterday 164. Daily Weight (document daily weights in flowsheets):   Decrease   Zone:(Pt Reported)  green     EF: 60-65% (result) on 2/26/2020 (date)   Type of HF:   HFpEF   Result status: Final result   · Normal cavity size, systolic function (ejection fraction normal) and diastolic function. Moderate concentric hypertrophy. Estimated left ventricular ejection fraction is 60 - 65%. No regional wall motion abnormality noted. Normal left ventricular strain. · Small circumferential pericardial effusion. · Image quality for this study was suboptimal.  · Moderately dilated left atrium. · Mild tricuspid valve regurgitation is present. · Mild pulmonary hypertension.      Cardiac Device present: none     Heart Failure Medications: Betablocker, Diuretic, Anticoagulant     Method of communication with provider :chart routing, staff message, phone    Was this a readmission? yes   Patient stated reason for the readmission: dyspnea, swelling of arms, legs, face    Care Transition Nurse (CTN) contacted the family by telephone to perform post hospital discharge assessment. Verified name and  with family as identifiers. Provided introduction to self, and explanation of the CTN role. Family received hospital discharge instructions. CTN reviewed discharge instructions and red flags with family who verbalized understanding. Family given an opportunity to ask questions and does not have any further questions or concerns at this time. The family agrees to contact the PCP office for questions related to their healthcare. CTN provided contact information for future reference. Disease Specific:   CHF    Patients top risk factors for readmission:  functional physical ability, lack of knowledge about disease, medical condition    Home Health orders at discharge: none/ note to PT and provider re: [address]: none  Date of initial visit: none    Durable Medical Equipment ordered at [address] received: n/a  Pt uses a cane PTA    Medication(s):   New Medications at Discharge: Coreg 12.5 mg bid, Lasix 40 mg qd  Changed Medications at Discharge: none  Discontinued Medications at Discharge: Lopressor, Hydralazine    Medication reconciliation was performed with family, who verbalizes understanding of administration of home medications. There were no barriers to obtaining medications identified at this time. Referral to Pharm D needed: no       [facility] follow up appointment(s):Dr. Imelda Harry - cardiology 3/4 at 1:40 pm  Non-[facility] follow up appointment(s): none  [facility]  information provided as a resource     Goals      Attends follow-up appointments as directed.       3/3/2020  Pt to see Dr. Hector Morales - cardiology on 3/4 at 1:40 pm - daughter is aware. Pt lives in [facility] and intent is to return there / and will follow up there with pcp and cardiology. ttk       Knowledge and adherence of prescribed medication (ie. action, side effects, missed dose, etc.). 3/3/2020  Pt continues on Eliquis 2.5 bid - for PAF. Pt had Lopressor stopped and Coreg 12.5 mg bid started -   Lasix 40 mg every day -     Hydralazine was stopped. ttk       Returns to baseline activity level. 3/3/2020  Pt has scale for daily am weights - weight 3/2 164, weight 3/3 161 -   Diuresis discussed and cautions with hypotension/ and bp management with changes and diuresis. Hydralazine stopped. Monitor bp discussed and encouraged for optimization of meds -     Walking short intervals frequently -   Leg elevation.   ttk          ______________________________________________________________________________  PT note - 2/28 -   PLAN :  Recommendations and Planned Interventions: transfer training, gait training, therapeutic exercises, edema management/control, patient and family training/education, and therapeutic activities       Frequency/Duration: Patient will be followed by physical therapy:  5 times a week to address goals.     Recommendation for discharge: (in order for the patient to meet his/her long term goals)  HHPT x 1-2 weeks     This discharge recommendation:  Has not yet been discussed the attending provider and/or case management     IF patient discharges home will need the following DME: none  _______________________________  2/28/2020 - walk test - oximetry  Deuce Vicente RT   Respiratory Therapist   Respiratory Therapy   Progress Notes   Signed   Date of Service:  02/28/20 1205                    []Hide copied text    []Hover for details       02/28/20 1150 02/28/20 1152 02/28/20 1153   RT Walking Oximetry   Stage Resting (Room Air) During Walk (Room Air) During Walk (Room Air)   SpO2 92 % 91 % 92 %   HR 69 bpm 74 bpm 72 bpm   Rate of Dyspnea 0 0 0        02/28/20 1154 02/28/20 1155 02/28/20 1156   RT Walking Oximetry   Stage During Walk (Room Air) During Walk (Room Air) During Walk (Room Air)   SpO2 91 % 92 % 93 %   HR 73 bpm 76 bpm 80 bpm   Rate of Dyspnea 1 1 1        02/28/20 1159   RT Walking Oximetry   Stage After Walk   SpO2 95 %   HR 65 bpm   Rate of Dyspnea 1      Permission given from RN to walk patient in al for O2 need. Patient tolerated walk well ending rr 26.  Pt stated she was mildly sob        ttk

## 2020-03-04 ENCOUNTER — OFFICE VISIT (OUTPATIENT)
Dept: CARDIOLOGY CLINIC | Age: 85
End: 2020-03-04

## 2020-03-04 ENCOUNTER — TELEPHONE (OUTPATIENT)
Dept: CARDIOLOGY CLINIC | Age: 85
End: 2020-03-04

## 2020-03-04 VITALS
RESPIRATION RATE: 16 BRPM | OXYGEN SATURATION: 94 % | DIASTOLIC BLOOD PRESSURE: 68 MMHG | BODY MASS INDEX: 31.69 KG/M2 | HEART RATE: 81 BPM | WEIGHT: 161.4 LBS | HEIGHT: 60 IN | SYSTOLIC BLOOD PRESSURE: 162 MMHG

## 2020-03-04 DIAGNOSIS — I50.30 DIASTOLIC HEART FAILURE, UNSPECIFIED HF CHRONICITY (HCC): ICD-10-CM

## 2020-03-04 DIAGNOSIS — N18.30 STAGE 3 CHRONIC KIDNEY DISEASE (HCC): ICD-10-CM

## 2020-03-04 DIAGNOSIS — R60.0 EDEMA OF UPPER EXTREMITY: Primary | ICD-10-CM

## 2020-03-04 DIAGNOSIS — I10 ESSENTIAL HYPERTENSION: ICD-10-CM

## 2020-03-04 DIAGNOSIS — I48.0 PAROXYSMAL ATRIAL FIBRILLATION (HCC): ICD-10-CM

## 2020-03-04 RX ORDER — FUROSEMIDE 80 MG/1
80 TABLET ORAL DAILY
Qty: 30 TAB | Refills: 5 | Status: SHIPPED | OUTPATIENT
Start: 2020-03-04 | End: 2020-03-04 | Stop reason: SDUPTHER

## 2020-03-04 RX ORDER — CARVEDILOL 25 MG/1
25 TABLET ORAL 2 TIMES DAILY WITH MEALS
Qty: 60 TAB | Refills: 11 | Status: SHIPPED | OUTPATIENT
Start: 2020-03-04 | End: 2020-03-12 | Stop reason: ALTCHOICE

## 2020-03-04 RX ORDER — CARVEDILOL 25 MG/1
25 TABLET ORAL 2 TIMES DAILY WITH MEALS
Qty: 60 TAB | Refills: 11 | Status: SHIPPED | OUTPATIENT
Start: 2020-03-04 | End: 2020-03-04 | Stop reason: SDUPTHER

## 2020-03-04 RX ORDER — FUROSEMIDE 80 MG/1
80 TABLET ORAL DAILY
Qty: 30 TAB | Refills: 5 | Status: SHIPPED | OUTPATIENT
Start: 2020-03-04 | End: 2020-09-15 | Stop reason: SDUPTHER

## 2020-03-04 NOTE — TELEPHONE ENCOUNTER
Called Dr. Sivan Arzate office (patient's PCP) ph # [phone] for fax # to request records. Left message for Dr. Kimo Sandoval nurse to fax patient's records or call back with fax #. Patient signed records release form. Called again and got fax # to fax records request. Fax #[phone]. Faxed records request. Dr. Rebecca Rizvi was going to order an abdominal test to check liver, but patient wanted him to review her records first because it may not be necessary. Dr. Kimo Sandoval office called stating they do not have much on patient except a \"transition of care\" note. There is no abdominal US, labs, or other testing, but she will fax what they have. Records given to Dr. Rebecca Rizvi.

## 2020-03-04 NOTE — PROGRESS NOTES
HISTORY OF PRESENT ILLNESS  Som Saunders is a 80 y.o. female. She was recently seen at [facility] after being admitted for shortness of breath and swelling. She lives in [place] but was here visiting her daughter. She gave a history of paroxysmal atrial fibrillation and also chronic disease. Her echocardiogram in the hospital showed normal systolic function but some diastolic dysfunction with left ventricular hypertrophy. She had generalized edema and this improved somewhat with starting of loop diuretic. She was given Lasix 40 mg a day. Her creatinine was in the range of 1.5 and went up to 1.7 prior to discharge. She was noted to have an albumin of 2.3. She is a difficult historian but had some liver issues in the past and in fact some doctor in [place] actually told her that she might need a liver transplant at some point. This was many years ago, according to her daughter. She has shortness of breath and still has a lot of swelling especially in her arms. She also has distension of her abdomen. Her alkaline phosphatase was also elevated in the hospital.       HPI  Patient Active Problem List   Diagnosis Code    Heart failure ([facility] [address] I50.9    Edema of upper extremity R60.0    CKD (chronic kidney disease) N18.9    Paroxysmal atrial fibrillation (HCC) I48.0    HTN (hypertension) I10     Current Outpatient Medications   Medication Sig Dispense Refill    furosemide (LASIX) 80 mg tablet Take 1 Tab by mouth daily. 30 Tab 5    carvediloL (COREG) 25 mg tablet Take 1 Tab by mouth two (2) times daily (with meals) for 30 days. 60 Tab 11    amLODIPine (NORVASC) 5 mg tablet Take 5 mg by mouth two (2) times a day.  apixaban (ELIQUIS) 2.5 mg tablet Take 2.5 mg by mouth two (2) times a day.  aspirin delayed-release 81 mg tablet Take 81 mg by mouth daily.  rosuvastatin (CRESTOR) 40 mg tablet Take 40 mg by mouth nightly.       venlafaxine-SR (EFFEXOR-XR) 75 mg capsule Take 75 mg by mouth daily. Past Medical History:   Diagnosis Date    A-fib [facility])     Bilateral cataracts     Bronchitis     CKD (chronic kidney disease)     GERD (gastroesophageal reflux disease)     HTN (hypertension)     Hyperlipidemia     Insomnia     Stuttering      Past Surgical History:   Procedure Laterality Date    HX CAROTID ENDARTERECTOMY      HX HYSTERECTOMY      HX TONSILLECTOMY         Review of Systems   Respiratory: Positive for shortness of breath. Cardiovascular: Positive for leg swelling. All other systems reviewed and are negative. Visit Vitals  /68 (BP 1 Location: Right arm, BP Patient Position: Sitting)   Pulse 81   Resp 16   Ht 5' (1.524 m)   Wt 161 lb 6.4 oz (73.2 kg)   SpO2 94%   BMI 31.52 kg/m²       Physical Exam  Vitals signs and nursing note reviewed. Constitutional:       Appearance: She is obese. HENT:      Head: Normocephalic. Right Ear: External ear normal.      Nose: Nose normal.      Mouth/Throat:      Mouth: Mucous membranes are moist.   Neck:      Musculoskeletal: Normal range of motion. Cardiovascular:      Rate and Rhythm: Normal rate and regular rhythm. Heart sounds: No murmur. No friction rub. No gallop. Pulmonary:      Effort: No respiratory distress. Breath sounds: No wheezing or rales. Abdominal:      General: There is distension. Palpations: Abdomen is soft. Musculoskeletal:         General: Swelling present. Skin:     General: Skin is warm. Neurological:      General: No focal deficit present. Mental Status: She is alert. Psychiatric:         Behavior: Behavior normal.         ASSESSMENT and PLAN  I am concerned that she has cirrhosis causing her generalized swelling, low albumin and elevated alkaline phosphatase. I suspect she will need to see a liver specialist. I also feel she would benefit from an ultrasound but her daughter wants us to hold off on this.  We will try to obtain records from her physician in [place] in lieu of the ultrasound. In the meantime, I will go up to 25 mg twice daily on carvedilol and 80 mg daily on Lasix. She will have blood work done in the near future and we will call regarding the results. I will see her back in three weeks' time.

## 2020-03-05 LAB
ALBUMIN SERPL-MCNC: 3.3 G/DL (ref 3.6–4.6)
ALBUMIN/GLOB SERPL: 1.5 {RATIO} (ref 1.2–2.2)
ALP SERPL-CCNC: 160 IU/L (ref 39–117)
ALT SERPL-CCNC: 15 IU/L (ref 0–32)
AST SERPL-CCNC: 24 IU/L (ref 0–40)
BILIRUB SERPL-MCNC: <0.2 MG/DL (ref 0–1.2)
BUN SERPL-MCNC: 19 MG/DL (ref 8–27)
BUN/CREAT SERPL: 13 (ref 12–28)
CALCIUM SERPL-MCNC: 8.8 MG/DL (ref 8.7–10.3)
CHLORIDE SERPL-SCNC: 105 MMOL/L (ref 96–106)
CO2 SERPL-SCNC: 20 MMOL/L (ref 20–29)
CREAT SERPL-MCNC: 1.51 MG/DL (ref 0.57–1)
ERYTHROCYTE [DISTWIDTH] IN BLOOD BY AUTOMATED COUNT: 13.1 % (ref 11.7–15.4)
GLOBULIN SER CALC-MCNC: 2.2 G/DL (ref 1.5–4.5)
GLUCOSE SERPL-MCNC: 94 MG/DL (ref 65–99)
HCT VFR BLD AUTO: 36.9 % (ref 34–46.6)
HGB BLD-MCNC: 12.8 G/DL (ref 11.1–15.9)
INTERPRETATION: NORMAL
MCH RBC QN AUTO: 30 PG (ref 26.6–33)
MCHC RBC AUTO-ENTMCNC: 34.7 G/DL (ref 31.5–35.7)
MCV RBC AUTO: 87 FL (ref 79–97)
PLATELET # BLD AUTO: 404 X10E3/UL (ref 150–450)
POTASSIUM SERPL-SCNC: 4.6 MMOL/L (ref 3.5–5.2)
PROT SERPL-MCNC: 5.5 G/DL (ref 6–8.5)
RBC # BLD AUTO: 4.26 X10E6/UL (ref 3.77–5.28)
SODIUM SERPL-SCNC: 140 MMOL/L (ref 134–144)
TSH SERPL DL<=0.005 MIU/L-ACNC: 2.27 UIU/ML (ref 0.45–4.5)
WBC # BLD AUTO: 8.5 X10E3/UL (ref 3.4–10.8)

## 2020-03-11 ENCOUNTER — TELEPHONE (OUTPATIENT)
Dept: CARDIOLOGY CLINIC | Age: 85
End: 2020-03-11

## 2020-03-11 NOTE — TELEPHONE ENCOUNTER
Called Pt daughter Rama Pastor patient identifiers confirmed. Gama Hagen stated that the pt has been having problems with lightheadedness and feeling like she is going to pass out when she rolls over at night. Gama Hagen stated that pt would like to know if they can decrease carvedilol back to 12. 5. will ask MD.

## 2020-03-12 RX ORDER — CARVEDILOL 12.5 MG/1
TABLET ORAL 2 TIMES DAILY WITH MEALS
COMMUNITY
End: 2021-03-23 | Stop reason: SDUPTHER

## 2020-03-12 NOTE — TELEPHONE ENCOUNTER
2 pt identifiers used  Manuel Ojeda informed per Dr. Rashmi WEBB to reduce coreg & she verbalized her understanding

## 2020-03-24 ENCOUNTER — TELEPHONE (OUTPATIENT)
Dept: CARDIOLOGY CLINIC | Age: 85
End: 2020-03-24

## 2020-03-24 NOTE — TELEPHONE ENCOUNTER
Patients daughter called to cancel the patients upcoming appointment with Dr. Ye patricia. Patients daughter states that the patient is doing well and they will call back to reschedule at a later date.

## 2020-04-07 ENCOUNTER — PATIENT OUTREACH (OUTPATIENT)
Dept: CASE MANAGEMENT | Age: 85
End: 2020-04-07

## 2020-04-07 NOTE — PROGRESS NOTES
Care transitions nurse - Bundle / CHF follow up 4/7/2020    Pt was seen for follow up by Dr. Kelly Tam - on 3/4 - post hospital and she demonstrated leg swelling , generalized swelling - Pt had reported some history of liver problems in the past -     I am concerned that she has cirrhosis causing her generalized swelling, low albumin and elevated alkaline phosphatase. I suspect she will need to see a liver specialist. I also feel she would benefit from an ultrasound but her daughter wants us to hold off on this. We will try to obtain records from her physician in [place] in lieu of the ultrasound. In the meantime, I will go up to 25 mg twice daily on carvedilol and 80 mg daily on Lasix. She will have blood work done in the near future and we will call regarding the results. I will see her back in three weeks' time. Pt had appt for 3/25 - not atttended. Pt's labs results were mailed / to [address] [place] [address] Avenue was staying in [place] with her daughter. 4/8/2020  Call to and reached pt's daughter who said Ms. Marvin Villareal is doing much better - her swelling has resolved - she has lost about 18 pounds -   They cancelled the 3/25 appt d/t [place] - I told her we have 24/7 coverage at [phone], if needed, and that we could also do virtual visits, if needed -  They are using Coreg 12.5 mg bid - b/c she was dizzy with the 25 mg bid - they continue Lasix 80 mg -   Daily weights encouraged -   Covid 19 precautions discussed - she is still in [place] with her daughter - and doing well. Note to provider re: status.     Chip Desir RN, [facility], [facility]  Care transitions nurse [phone]  [facility] Coordination Team

## 2020-04-09 ENCOUNTER — TELEPHONE (OUTPATIENT)
Dept: CARDIOLOGY CLINIC | Age: 85
End: 2020-04-09

## 2020-04-09 NOTE — TELEPHONE ENCOUNTER
Dr. Albania Graham asked that I reach out to patient's daughter to see if she wanted to schedule phone visit for patient in a couple weeks (week of April 20th). Called William Bailey (HIPAA verified). The phone rang and no voicemail to leave message. Also called # on file for patient. No answer or voicemail to leave message. Will try back.

## 2020-04-16 DIAGNOSIS — I48.0 PAROXYSMAL ATRIAL FIBRILLATION (HCC): Primary | ICD-10-CM

## 2020-04-16 NOTE — TELEPHONE ENCOUNTER
Requested Prescriptions     Signed Prescriptions Disp Refills    apixaban (Eliquis) 2.5 mg tablet 60 Tab 2     Sig: Take 1 Tab by mouth two (2) times a day.      Authorizing Provider: Riaz Westfall     Ordering User: Duyen Rooney    Per Dr. Salbador Cooper verbal order

## 2020-05-20 DIAGNOSIS — I50.30 DIASTOLIC HEART FAILURE, UNSPECIFIED HF CHRONICITY (HCC): Primary | ICD-10-CM

## 2020-05-20 RX ORDER — ROSUVASTATIN CALCIUM 40 MG/1
40 TABLET, COATED ORAL
Qty: 90 TAB | Refills: 1 | Status: SHIPPED | OUTPATIENT
Start: 2020-05-20 | End: 2020-11-19 | Stop reason: SDUPTHER

## 2020-05-20 NOTE — TELEPHONE ENCOUNTER
Requested Prescriptions     Signed Prescriptions Disp Refills    rosuvastatin (CRESTOR) 40 mg tablet 90 Tab 1     Sig: Take 1 Tab by mouth nightly.      Authorizing Provider: Fabio Arteaga     Ordering User: Sherrie Ling    Per Dr. Robyn Crocker verbal order

## 2020-07-15 DIAGNOSIS — I48.0 PAROXYSMAL ATRIAL FIBRILLATION (HCC): ICD-10-CM

## 2020-07-21 RX ORDER — APIXABAN 2.5 MG/1
TABLET, FILM COATED ORAL
Qty: 60 TAB | Refills: 2 | Status: SHIPPED | OUTPATIENT
Start: 2020-07-21

## 2020-07-21 NOTE — TELEPHONE ENCOUNTER
Requested Prescriptions     Signed Prescriptions Disp Refills    Eliquis 2.5 mg tablet 60 Tab 2     Sig: TAKE 1 TABLET BY MOUTH TWICE A DAY     Authorizing Provider: Jennifer Hill     Ordering User: Lynda Case    Per Dr. Carter Keep verbal order

## 2020-08-12 ENCOUNTER — OFFICE VISIT (OUTPATIENT)
Dept: CARDIOLOGY CLINIC | Age: 85
End: 2020-08-12
Payer: MEDICARE

## 2020-08-12 VITALS
HEIGHT: 60 IN | DIASTOLIC BLOOD PRESSURE: 68 MMHG | SYSTOLIC BLOOD PRESSURE: 134 MMHG | OXYGEN SATURATION: 98 % | BODY MASS INDEX: 27.13 KG/M2 | RESPIRATION RATE: 16 BRPM | WEIGHT: 138.2 LBS | HEART RATE: 66 BPM

## 2020-08-12 DIAGNOSIS — I50.30 DIASTOLIC HEART FAILURE, UNSPECIFIED HF CHRONICITY (HCC): Primary | ICD-10-CM

## 2020-08-12 DIAGNOSIS — I48.0 PAROXYSMAL ATRIAL FIBRILLATION (HCC): ICD-10-CM

## 2020-08-12 DIAGNOSIS — I10 ESSENTIAL HYPERTENSION: ICD-10-CM

## 2020-08-12 DIAGNOSIS — R60.0 EDEMA OF UPPER EXTREMITY: ICD-10-CM

## 2020-08-12 DIAGNOSIS — N18.30 STAGE 3 CHRONIC KIDNEY DISEASE (HCC): ICD-10-CM

## 2020-08-12 PROCEDURE — 99214 OFFICE O/P EST MOD 30 MIN: CPT | Performed by: SPECIALIST

## 2020-08-12 PROCEDURE — 1090F PRES/ABSN URINE INCON ASSESS: CPT | Performed by: SPECIALIST

## 2020-08-12 PROCEDURE — G8432 DEP SCR NOT DOC, RNG: HCPCS | Performed by: SPECIALIST

## 2020-08-12 PROCEDURE — G8427 DOCREV CUR MEDS BY ELIG CLIN: HCPCS | Performed by: SPECIALIST

## 2020-08-12 PROCEDURE — G8536 NO DOC ELDER MAL SCRN: HCPCS | Performed by: SPECIALIST

## 2020-08-12 PROCEDURE — 1101F PT FALLS ASSESS-DOCD LE1/YR: CPT | Performed by: SPECIALIST

## 2020-08-12 PROCEDURE — G8417 CALC BMI ABV UP PARAM F/U: HCPCS | Performed by: SPECIALIST

## 2020-08-12 RX ORDER — CETIRIZINE HCL 10 MG
TABLET ORAL
COMMUNITY
Start: 2020-06-03

## 2020-08-12 RX ORDER — FOLIC ACID 1 MG/1
1 TABLET ORAL DAILY
COMMUNITY

## 2020-08-12 NOTE — PROGRESS NOTES
HISTORY OF PRESENT ILLNESS  Francisca Regan is a 80 y.o. female. She was last seen 5 months ago with Diastolic heart failure and fluid overload and chronic kidney disease. Her weight is down 23 pounds since that time. I had increased her furosemide from 40 to 80 mg a day when I saw her and also increased her carvedilol. Her blood pressure is also better and her swelling and shortness of breath have improved. She has since been back to [place] and her kidney doctor there noted that her function was slightly worse. She is supposed to have a telephone call with a renal physician here in about 1 week. HPI  Patient Active Problem List   Diagnosis Code    Heart failure ([facility] [address] I50.9    Edema of upper extremity R60.0    CKD (chronic kidney disease) N18.9    Paroxysmal atrial fibrillation (HCC) I48.0    HTN (hypertension) I10     Current Outpatient Medications   Medication Sig Dispense Refill    cetirizine (ZYRTEC) 10 mg tablet TAKE 1 TABLET BY MOUTH EVERY NIGHT AT BEDTIME AS NEEDED FOR ALLERGIES      folic acid (FOLVITE) 1 mg tablet Take 1 mg by mouth daily.  Eliquis 2.5 mg tablet TAKE 1 TABLET BY MOUTH TWICE A DAY 60 Tab 2    rosuvastatin (CRESTOR) 40 mg tablet Take 1 Tab by mouth nightly. 90 Tab 1    carvediloL (COREG) 12.5 mg tablet Take  by mouth two (2) times daily (with meals).  furosemide (LASIX) 80 mg tablet Take 1 Tab by mouth daily. 30 Tab 5    amLODIPine (NORVASC) 5 mg tablet Take 5 mg by mouth two (2) times a day.  aspirin delayed-release 81 mg tablet Take 81 mg by mouth daily.  venlafaxine-SR (EFFEXOR-XR) 75 mg capsule Take 75 mg by mouth daily.        Past Medical History:   Diagnosis Date    A-fib [facility])     Bilateral cataracts     Bronchitis     CKD (chronic kidney disease)     GERD (gastroesophageal reflux disease)     HTN (hypertension)     Hyperlipidemia     Insomnia     Stuttering      Past Surgical History:   Procedure Laterality Date    HX CAROTID ENDARTERECTOMY      HX HYSTERECTOMY      HX TONSILLECTOMY         Review of Systems   Respiratory: Positive for shortness of breath. Cardiovascular: Positive for leg swelling. All other systems reviewed and are negative. Visit Vitals  /68 (BP 1 Location: Left arm, BP Patient Position: Sitting)   Pulse 66   Resp 16   Ht 5' (1.524 m)   Wt 138 lb 3.2 oz (62.7 kg)   SpO2 98%   BMI 26.99 kg/m²       Physical Exam   Constitutional: She appears well-nourished. HENT:   Head: Atraumatic. Eyes: Conjunctivae are normal.   Neck: Neck supple. Cardiovascular: Normal rate, regular rhythm and normal heart sounds. Exam reveals no gallop and no friction rub. No murmur heard. Pulmonary/Chest: Breath sounds normal. She has no wheezes. Abdominal: She exhibits no distension. Musculoskeletal:         General: No edema. Neurological: No cranial nerve deficit. Skin: Skin is dry. Psychiatric: Her behavior is normal.   Nursing note and vitals reviewed. ASSESSMENT and PLAN  She is doing better overall. I am concerned however that the diuresis may be excessive. I will therefore cut her Lasix back to 80 mg 3 days a week on Monday Wednesday and Friday and she will take one half or 40 mg the other days. I will see her back in 6 months time.

## 2020-09-15 DIAGNOSIS — I50.30 DIASTOLIC HEART FAILURE, UNSPECIFIED HF CHRONICITY (HCC): Primary | ICD-10-CM

## 2020-09-15 RX ORDER — FUROSEMIDE 80 MG/1
TABLET ORAL
Qty: 30 TAB | Refills: 5 | Status: SHIPPED | OUTPATIENT
Start: 2020-09-15 | End: 2021-04-02

## 2020-09-15 NOTE — TELEPHONE ENCOUNTER
Requested Prescriptions     Signed Prescriptions Disp Refills    furosemide (LASIX) 80 mg tablet 30 Tab 5     Sig: Take 80 mg (1 tab) by moth on Monday, Wednesday, Friday.  Take 40 mg (1/2 tab) on other days     Authorizing Provider: Lexi Fill     Ordering User: Negro Grant    Per Dr. Cristy Valente verbal order

## 2020-11-19 DIAGNOSIS — I50.30 DIASTOLIC HEART FAILURE, UNSPECIFIED HF CHRONICITY (HCC): ICD-10-CM

## 2020-11-19 RX ORDER — ROSUVASTATIN CALCIUM 40 MG/1
40 TABLET, COATED ORAL
Qty: 90 TAB | Refills: 3 | Status: SHIPPED | OUTPATIENT
Start: 2020-11-19 | End: 2021-04-27

## 2020-11-19 NOTE — TELEPHONE ENCOUNTER
Requested Prescriptions     Signed Prescriptions Disp Refills    rosuvastatin (CRESTOR) 40 mg tablet 90 Tab 3     Sig: Take 1 Tab by mouth nightly.      Authorizing Provider: Sacha Sweet     Ordering User: Yolette May    Per Dr. Deo Arana verbal order

## 2021-02-25 ENCOUNTER — OFFICE VISIT (OUTPATIENT)
Dept: CARDIOLOGY CLINIC | Age: 86
End: 2021-02-25
Payer: MEDICARE

## 2021-02-25 VITALS
HEART RATE: 57 BPM | DIASTOLIC BLOOD PRESSURE: 70 MMHG | WEIGHT: 153 LBS | BODY MASS INDEX: 30.04 KG/M2 | RESPIRATION RATE: 18 BRPM | HEIGHT: 60 IN | OXYGEN SATURATION: 97 % | SYSTOLIC BLOOD PRESSURE: 138 MMHG

## 2021-02-25 DIAGNOSIS — I48.0 PAROXYSMAL ATRIAL FIBRILLATION (HCC): Primary | ICD-10-CM

## 2021-02-25 DIAGNOSIS — I10 ESSENTIAL HYPERTENSION: ICD-10-CM

## 2021-02-25 DIAGNOSIS — I50.30 DIASTOLIC HEART FAILURE, UNSPECIFIED HF CHRONICITY (HCC): ICD-10-CM

## 2021-02-25 DIAGNOSIS — N18.32 STAGE 3B CHRONIC KIDNEY DISEASE (HCC): ICD-10-CM

## 2021-02-25 PROCEDURE — G0463 HOSPITAL OUTPT CLINIC VISIT: HCPCS | Performed by: SPECIALIST

## 2021-02-25 PROCEDURE — 99214 OFFICE O/P EST MOD 30 MIN: CPT | Performed by: SPECIALIST

## 2021-02-25 PROCEDURE — 1090F PRES/ABSN URINE INCON ASSESS: CPT | Performed by: SPECIALIST

## 2021-02-25 PROCEDURE — 1101F PT FALLS ASSESS-DOCD LE1/YR: CPT | Performed by: SPECIALIST

## 2021-02-25 PROCEDURE — G8417 CALC BMI ABV UP PARAM F/U: HCPCS | Performed by: SPECIALIST

## 2021-02-25 PROCEDURE — G8536 NO DOC ELDER MAL SCRN: HCPCS | Performed by: SPECIALIST

## 2021-02-25 PROCEDURE — G8510 SCR DEP NEG, NO PLAN REQD: HCPCS | Performed by: SPECIALIST

## 2021-02-25 PROCEDURE — G8427 DOCREV CUR MEDS BY ELIG CLIN: HCPCS | Performed by: SPECIALIST

## 2021-02-25 RX ORDER — CLOTRIMAZOLE AND BETAMETHASONE DIPROPIONATE 10; .64 MG/G; MG/G
CREAM TOPICAL
COMMUNITY
Start: 2021-02-15 | End: 2022-10-07

## 2021-02-25 RX ORDER — ESCITALOPRAM OXALATE 10 MG/1
10 TABLET ORAL DAILY
COMMUNITY
Start: 2021-01-19

## 2021-02-25 RX ORDER — ERGOCALCIFEROL 1.25 MG/1
CAPSULE ORAL
COMMUNITY
Start: 2020-11-25

## 2021-02-25 NOTE — PROGRESS NOTES
HISTORY OF PRESENT ILLNESS  Bea Nolasco is a 80 y.o. female. She has chronic kidney disease with a creatinine in the range of 1.9. She was seen in the past with fluid accumulation but this has been controlled with loop diuretics. She is seen with her daughter who helps to care for her. She has fallen twice in the last 30 days. She has a history of paroxysmal atrial fibrillation and takes Eliquis. She is in a wheelchair today. Her weight is up 15 pounds. HPI  Patient Active Problem List   Diagnosis Code    Heart failure ([facility] [address] I50.9    Edema of upper extremity R60.0    CKD (chronic kidney disease) N18.9    Paroxysmal atrial fibrillation (HCC) I48.0    HTN (hypertension) I10     Current Outpatient Medications   Medication Sig Dispense Refill    ergocalciferol (ERGOCALCIFEROL) 1,250 mcg (50,000 unit) capsule TAKE 1 CAPSULE BY MOUTH ONCE A WEEK      escitalopram oxalate (LEXAPRO) 10 mg tablet Take 10 mg by mouth daily.  clotrimazole-betamethasone (LOTRISONE) topical cream APPLY TO AFFECTED AREA TWICE A DAY FOR 2 WEEKS      rosuvastatin (CRESTOR) 40 mg tablet Take 1 Tab by mouth nightly. 90 Tab 3    furosemide (LASIX) 80 mg tablet Take 80 mg (1 tab) by moth on Monday, Wednesday, Friday. Take 40 mg (1/2 tab) on other days 30 Tab 5    cetirizine (ZYRTEC) 10 mg tablet TAKE 1 TABLET BY MOUTH EVERY NIGHT AT BEDTIME AS NEEDED FOR ALLERGIES      folic acid (FOLVITE) 1 mg tablet Take 1 mg by mouth daily.  Eliquis 2.5 mg tablet TAKE 1 TABLET BY MOUTH TWICE A DAY 60 Tab 2    carvediloL (COREG) 12.5 mg tablet Take  by mouth two (2) times daily (with meals).  amLODIPine (NORVASC) 5 mg tablet Take 5 mg by mouth two (2) times a day.  aspirin delayed-release 81 mg tablet Take 81 mg by mouth daily.  venlafaxine-SR (EFFEXOR-XR) 75 mg capsule Take 75 mg by mouth daily.        Past Medical History:   Diagnosis Date    A-fib [facility])     Bilateral cataracts     Bronchitis     CKD (chronic kidney disease)     GERD (gastroesophageal reflux disease)     HTN (hypertension)     Hyperlipidemia     Insomnia     Stuttering      Past Surgical History:   Procedure Laterality Date    HX CAROTID ENDARTERECTOMY      HX HYSTERECTOMY      HX TONSILLECTOMY         Review of Systems   Musculoskeletal: Positive for falls. All other systems reviewed and are negative. Visit Vitals  /70 (BP 1 Location: Right arm, BP Patient Position: Sitting, BP Cuff Size: Adult)   Pulse (!) 57   Resp 18   Ht 5' (1.524 m)   Wt 153 lb (69.4 kg)   SpO2 97%   BMI 29.88 kg/m²       Physical Exam   Constitutional: She is oriented to person, place, and time. She appears well-nourished. HENT:   Head: Atraumatic. Neck: Neck supple. Cardiovascular: Normal rate, regular rhythm and normal heart sounds. Exam reveals no gallop and no friction rub. No murmur heard. Pulmonary/Chest: Breath sounds normal. She has no wheezes. Abdominal: Bowel sounds are normal.   Musculoskeletal:         General: No edema. Neurological: She is oriented to person, place, and time. Skin: Skin is dry. Psychiatric: Her behavior is normal.   Nursing note and vitals reviewed. ASSESSMENT and PLAN  Although she has gained weight she has no evidence for fluid overload and no edema. Her blood pressure is reasonably well controlled and she is seeing a nephrologist in [place]. If she continues to fall then we may have to stop her oral anticoagulation. I spoke to her daughter about this. I will see her back in 6 months.

## 2021-03-23 DIAGNOSIS — I10 ESSENTIAL HYPERTENSION: Primary | ICD-10-CM

## 2021-03-23 RX ORDER — CARVEDILOL 3.12 MG/1
TABLET ORAL 2 TIMES DAILY WITH MEALS
OUTPATIENT
Start: 2021-03-23

## 2021-03-23 RX ORDER — CARVEDILOL 12.5 MG/1
12.5 TABLET ORAL 2 TIMES DAILY WITH MEALS
Qty: 60 TAB | Refills: 5 | Status: SHIPPED | OUTPATIENT
Start: 2021-03-23

## 2021-03-23 NOTE — TELEPHONE ENCOUNTER
Requested Prescriptions     Signed Prescriptions Disp Refills    carvediloL (COREG) 12.5 mg tablet 60 Tab 5     Sig: Take 1 Tab by mouth two (2) times daily (with meals). Authorizing Provider: Rafael Menendez     Ordering User: Blake Alcala     Refused Prescriptions Disp Refills    carvediloL (COREG) 3.125 mg tablet        Sig: Take  by mouth two (2) times daily (with meals).      Refused By: Blake Alcala     Reason for Refusal: other   Per Dr. Lesly Navarro verbal order

## 2021-04-02 DIAGNOSIS — I50.30 DIASTOLIC HEART FAILURE, UNSPECIFIED HF CHRONICITY (HCC): ICD-10-CM

## 2021-04-02 RX ORDER — FUROSEMIDE 80 MG/1
TABLET ORAL
Qty: 90 TAB | Refills: 1 | Status: SHIPPED | OUTPATIENT
Start: 2021-04-02 | End: 2021-06-29 | Stop reason: ALTCHOICE

## 2021-04-02 NOTE — TELEPHONE ENCOUNTER
Requested Prescriptions     Signed Prescriptions Disp Refills    furosemide (LASIX) 80 mg tablet 90 Tab 1     Sig: TAKE 80MG (1 TAB) BY MOUTH ON MONDAY, WEDNESDAY, FRIDAY. TAKE 40MG (1/2 TAB) ON OTHER DAYS.      Authorizing Provider: Flor Estevez     Ordering User: Juany Matta    Per Dr. Armen Goodrich verbal order negative Soft, non-tender, no hepatosplenomegaly, normal bowel sounds

## 2021-04-23 ENCOUNTER — APPOINTMENT (OUTPATIENT)
Dept: CT IMAGING | Age: 86
DRG: 065 | End: 2021-04-23
Attending: EMERGENCY MEDICINE
Payer: MEDICARE

## 2021-04-23 ENCOUNTER — HOSPITAL ENCOUNTER (INPATIENT)
Age: 86
LOS: 3 days | Discharge: NURSING, REHAB OR LONG-TERM CARE | DRG: 065 | End: 2021-04-27
Attending: EMERGENCY MEDICINE | Admitting: INTERNAL MEDICINE
Payer: MEDICARE

## 2021-04-23 DIAGNOSIS — R55 NEAR SYNCOPE: Primary | ICD-10-CM

## 2021-04-23 DIAGNOSIS — R47.1 DYSARTHRIA: ICD-10-CM

## 2021-04-23 LAB
25(OH)D3 SERPL-MCNC: 20.4 NG/ML (ref 30–100)
ALBUMIN SERPL-MCNC: 2.4 G/DL (ref 3.5–5)
ALBUMIN/GLOB SERPL: 0.6 {RATIO} (ref 1.1–2.2)
ALP SERPL-CCNC: 135 U/L (ref 45–117)
ALT SERPL-CCNC: 10 U/L (ref 12–78)
ANION GAP SERPL CALC-SCNC: 6 MMOL/L (ref 5–15)
APPEARANCE UR: CLEAR
AST SERPL-CCNC: 12 U/L (ref 15–37)
ATRIAL RATE: 60 BPM
BACTERIA URNS QL MICRO: ABNORMAL /HPF
BASOPHILS # BLD: 0.1 K/UL (ref 0–0.1)
BASOPHILS NFR BLD: 1 % (ref 0–1)
BILIRUB SERPL-MCNC: 0.3 MG/DL (ref 0.2–1)
BILIRUB UR QL: NEGATIVE
BUN SERPL-MCNC: 30 MG/DL (ref 6–20)
BUN/CREAT SERPL: 16 (ref 12–20)
CALCIUM SERPL-MCNC: 8.6 MG/DL (ref 8.5–10.1)
CALCULATED P AXIS, ECG09: 72 DEGREES
CALCULATED R AXIS, ECG10: -7 DEGREES
CALCULATED T AXIS, ECG11: -7 DEGREES
CHLORIDE SERPL-SCNC: 107 MMOL/L (ref 97–108)
CO2 SERPL-SCNC: 24 MMOL/L (ref 21–32)
COLOR UR: ABNORMAL
COMMENT, HOLDF: NORMAL
CREAT SERPL-MCNC: 1.83 MG/DL (ref 0.55–1.02)
DIAGNOSIS, 93000: NORMAL
DIFFERENTIAL METHOD BLD: NORMAL
EOSINOPHIL # BLD: 0.2 K/UL (ref 0–0.4)
EOSINOPHIL NFR BLD: 2 % (ref 0–7)
EPITH CASTS URNS QL MICRO: ABNORMAL /LPF
ERYTHROCYTE [DISTWIDTH] IN BLOOD BY AUTOMATED COUNT: 12.6 % (ref 11.5–14.5)
EST. AVERAGE GLUCOSE BLD GHB EST-MCNC: 120 MG/DL
FOLATE SERPL-MCNC: 14.2 NG/ML (ref 5–21)
GLOBULIN SER CALC-MCNC: 4.2 G/DL (ref 2–4)
GLUCOSE SERPL-MCNC: 148 MG/DL (ref 65–100)
GLUCOSE UR STRIP.AUTO-MCNC: 250 MG/DL
HBA1C MFR BLD: 5.8 % (ref 4–5.6)
HCT VFR BLD AUTO: 41.7 % (ref 35–47)
HGB BLD-MCNC: 13.3 G/DL (ref 11.5–16)
HGB UR QL STRIP: ABNORMAL
HYALINE CASTS URNS QL MICRO: ABNORMAL /LPF (ref 0–5)
IMM GRANULOCYTES # BLD AUTO: 0 K/UL (ref 0–0.04)
IMM GRANULOCYTES NFR BLD AUTO: 0 % (ref 0–0.5)
KETONES UR QL STRIP.AUTO: NEGATIVE MG/DL
LEUKOCYTE ESTERASE UR QL STRIP.AUTO: NEGATIVE
LYMPHOCYTES # BLD: 1.4 K/UL (ref 0.8–3.5)
LYMPHOCYTES NFR BLD: 16 % (ref 12–49)
MAGNESIUM SERPL-MCNC: 2 MG/DL (ref 1.6–2.4)
MCH RBC QN AUTO: 28.2 PG (ref 26–34)
MCHC RBC AUTO-ENTMCNC: 31.9 G/DL (ref 30–36.5)
MCV RBC AUTO: 88.5 FL (ref 80–99)
MONOCYTES # BLD: 0.7 K/UL (ref 0–1)
MONOCYTES NFR BLD: 8 % (ref 5–13)
NEUTS SEG # BLD: 6 K/UL (ref 1.8–8)
NEUTS SEG NFR BLD: 73 % (ref 32–75)
NITRITE UR QL STRIP.AUTO: NEGATIVE
NRBC # BLD: 0 K/UL (ref 0–0.01)
NRBC BLD-RTO: 0 PER 100 WBC
OTHER,OTHU: ABNORMAL
P-R INTERVAL, ECG05: 180 MS
PH UR STRIP: 7 [PH] (ref 5–8)
PHOSPHATE SERPL-MCNC: 3.5 MG/DL (ref 2.6–4.7)
PLATELET # BLD AUTO: 274 K/UL (ref 150–400)
PMV BLD AUTO: 9.9 FL (ref 8.9–12.9)
POTASSIUM SERPL-SCNC: 4.1 MMOL/L (ref 3.5–5.1)
PROT SERPL-MCNC: 6.6 G/DL (ref 6.4–8.2)
PROT UR STRIP-MCNC: >300 MG/DL
Q-T INTERVAL, ECG07: 448 MS
QRS DURATION, ECG06: 72 MS
QTC CALCULATION (BEZET), ECG08: 448 MS
RBC # BLD AUTO: 4.71 M/UL (ref 3.8–5.2)
RBC #/AREA URNS HPF: ABNORMAL /HPF (ref 0–5)
SAMPLES BEING HELD,HOLD: NORMAL
SODIUM SERPL-SCNC: 137 MMOL/L (ref 136–145)
SP GR UR REFRACTOMETRY: 1.02 (ref 1–1.03)
TROPONIN I SERPL-MCNC: <0.05 NG/ML
TSH SERPL DL<=0.05 MIU/L-ACNC: 1.76 UIU/ML (ref 0.36–3.74)
UR CULT HOLD, URHOLD: NORMAL
UROBILINOGEN UR QL STRIP.AUTO: 0.2 EU/DL (ref 0.2–1)
VENTRICULAR RATE, ECG03: 60 BPM
VIT B12 SERPL-MCNC: 299 PG/ML (ref 193–986)
WBC # BLD AUTO: 8.3 K/UL (ref 3.6–11)
WBC URNS QL MICRO: ABNORMAL /HPF (ref 0–4)

## 2021-04-23 PROCEDURE — 70450 CT HEAD/BRAIN W/O DYE: CPT

## 2021-04-23 PROCEDURE — 99285 EMERGENCY DEPT VISIT HI MDM: CPT

## 2021-04-23 PROCEDURE — 99218 HC RM OBSERVATION: CPT

## 2021-04-23 PROCEDURE — 83921 ORGANIC ACID SINGLE QUANT: CPT

## 2021-04-23 PROCEDURE — 74011250636 HC RX REV CODE- 250/636: Performed by: INTERNAL MEDICINE

## 2021-04-23 PROCEDURE — 96374 THER/PROPH/DIAG INJ IV PUSH: CPT

## 2021-04-23 PROCEDURE — 82306 VITAMIN D 25 HYDROXY: CPT

## 2021-04-23 PROCEDURE — 83735 ASSAY OF MAGNESIUM: CPT

## 2021-04-23 PROCEDURE — 84484 ASSAY OF TROPONIN QUANT: CPT

## 2021-04-23 PROCEDURE — 83036 HEMOGLOBIN GLYCOSYLATED A1C: CPT

## 2021-04-23 PROCEDURE — 82746 ASSAY OF FOLIC ACID SERUM: CPT

## 2021-04-23 PROCEDURE — 74011250637 HC RX REV CODE- 250/637: Performed by: INTERNAL MEDICINE

## 2021-04-23 PROCEDURE — 74011000250 HC RX REV CODE- 250: Performed by: INTERNAL MEDICINE

## 2021-04-23 PROCEDURE — 84443 ASSAY THYROID STIM HORMONE: CPT

## 2021-04-23 PROCEDURE — 84100 ASSAY OF PHOSPHORUS: CPT

## 2021-04-23 PROCEDURE — 36415 COLL VENOUS BLD VENIPUNCTURE: CPT

## 2021-04-23 PROCEDURE — 93005 ELECTROCARDIOGRAM TRACING: CPT

## 2021-04-23 PROCEDURE — 82607 VITAMIN B-12: CPT

## 2021-04-23 PROCEDURE — 85025 COMPLETE CBC W/AUTO DIFF WBC: CPT

## 2021-04-23 PROCEDURE — 81001 URINALYSIS AUTO W/SCOPE: CPT

## 2021-04-23 PROCEDURE — 80053 COMPREHEN METABOLIC PANEL: CPT

## 2021-04-23 RX ORDER — SODIUM CHLORIDE 450 MG/100ML
75 INJECTION, SOLUTION INTRAVENOUS CONTINUOUS
Status: DISCONTINUED | OUTPATIENT
Start: 2021-04-23 | End: 2021-04-24

## 2021-04-23 RX ORDER — ACETAMINOPHEN 650 MG/1
650 SUPPOSITORY RECTAL
Status: DISCONTINUED | OUTPATIENT
Start: 2021-04-23 | End: 2021-04-27 | Stop reason: HOSPADM

## 2021-04-23 RX ORDER — ERGOCALCIFEROL 1.25 MG/1
50000 CAPSULE ORAL
Status: DISCONTINUED | OUTPATIENT
Start: 2021-04-23 | End: 2021-04-27 | Stop reason: HOSPADM

## 2021-04-23 RX ORDER — POLYETHYLENE GLYCOL 3350 17 G/17G
17 POWDER, FOR SOLUTION ORAL DAILY PRN
Status: DISCONTINUED | OUTPATIENT
Start: 2021-04-23 | End: 2021-04-27 | Stop reason: HOSPADM

## 2021-04-23 RX ORDER — LANOLIN ALCOHOL/MO/W.PET/CERES
1000 CREAM (GRAM) TOPICAL DAILY
Status: DISCONTINUED | OUTPATIENT
Start: 2021-04-23 | End: 2021-04-27 | Stop reason: HOSPADM

## 2021-04-23 RX ORDER — ACETAMINOPHEN 325 MG/1
650 TABLET ORAL
Status: DISCONTINUED | OUTPATIENT
Start: 2021-04-23 | End: 2021-04-27 | Stop reason: HOSPADM

## 2021-04-23 RX ORDER — SODIUM CHLORIDE 0.9 % (FLUSH) 0.9 %
5-40 SYRINGE (ML) INJECTION AS NEEDED
Status: DISCONTINUED | OUTPATIENT
Start: 2021-04-23 | End: 2021-04-27 | Stop reason: HOSPADM

## 2021-04-23 RX ORDER — ONDANSETRON 2 MG/ML
4 INJECTION INTRAMUSCULAR; INTRAVENOUS
Status: DISCONTINUED | OUTPATIENT
Start: 2021-04-23 | End: 2021-04-27 | Stop reason: HOSPADM

## 2021-04-23 RX ORDER — AMLODIPINE BESYLATE 5 MG/1
5 TABLET ORAL DAILY
Status: DISCONTINUED | OUTPATIENT
Start: 2021-04-23 | End: 2021-04-25

## 2021-04-23 RX ORDER — ESCITALOPRAM OXALATE 10 MG/1
10 TABLET ORAL DAILY
Status: DISCONTINUED | OUTPATIENT
Start: 2021-04-24 | End: 2021-04-27 | Stop reason: HOSPADM

## 2021-04-23 RX ORDER — SODIUM CHLORIDE 0.9 % (FLUSH) 0.9 %
5-40 SYRINGE (ML) INJECTION EVERY 8 HOURS
Status: DISCONTINUED | OUTPATIENT
Start: 2021-04-23 | End: 2021-04-27 | Stop reason: HOSPADM

## 2021-04-23 RX ORDER — HYDRALAZINE HYDROCHLORIDE 20 MG/ML
20 INJECTION INTRAMUSCULAR; INTRAVENOUS
Status: DISCONTINUED | OUTPATIENT
Start: 2021-04-23 | End: 2021-04-27 | Stop reason: HOSPADM

## 2021-04-23 RX ORDER — ONDANSETRON 4 MG/1
4 TABLET, ORALLY DISINTEGRATING ORAL
Status: DISCONTINUED | OUTPATIENT
Start: 2021-04-23 | End: 2021-04-27 | Stop reason: HOSPADM

## 2021-04-23 RX ORDER — CARVEDILOL 12.5 MG/1
12.5 TABLET ORAL 2 TIMES DAILY WITH MEALS
Status: DISCONTINUED | OUTPATIENT
Start: 2021-04-23 | End: 2021-04-27 | Stop reason: HOSPADM

## 2021-04-23 RX ORDER — ATORVASTATIN CALCIUM 40 MG/1
40 TABLET, FILM COATED ORAL
Status: DISCONTINUED | OUTPATIENT
Start: 2021-04-23 | End: 2021-04-25

## 2021-04-23 RX ADMIN — Medication 10 ML: at 21:41

## 2021-04-23 RX ADMIN — SODIUM CHLORIDE 75 ML/HR: 4.5 INJECTION, SOLUTION INTRAVENOUS at 21:41

## 2021-04-23 RX ADMIN — CARVEDILOL 12.5 MG: 12.5 TABLET, FILM COATED ORAL at 17:58

## 2021-04-23 RX ADMIN — HYDRALAZINE HYDROCHLORIDE 20 MG: 20 INJECTION INTRAMUSCULAR; INTRAVENOUS at 18:27

## 2021-04-23 RX ADMIN — ERGOCALCIFEROL 50000 UNITS: 1.25 CAPSULE ORAL at 19:22

## 2021-04-23 RX ADMIN — APIXABAN 2.5 MG: 2.5 TABLET, FILM COATED ORAL at 18:26

## 2021-04-23 RX ADMIN — ATORVASTATIN CALCIUM 40 MG: 40 TABLET, FILM COATED ORAL at 21:39

## 2021-04-23 RX ADMIN — Medication 10 ML: at 17:59

## 2021-04-23 RX ADMIN — AMLODIPINE BESYLATE 5 MG: 5 TABLET ORAL at 17:58

## 2021-04-23 NOTE — H&P
[facility]  History and Physical    Primary Care Provider: Other, MD Gail  Date of Service:  4/23/2021    Subjective:     Alverto Bhatti is a 80 y.o. female with h/o dementia, HTN, afib on Eliquis, stuttering, and CKD who lives with her daughter who over the last few days has had some transient mumbling/garbling of speech. She has had transient episodes just like this over the last year or so that always resolve. At some point she was told she had a TIA and was put on Eliquis for afib. Sometimes they last up to a day. It occurred a few times during my exam and consisted of intermittent garbled speech with difficulty understanding what she was trying to say. Daughter also notes progressive generalized weakness and fatigue and her not wanting to get out of bed. No falls. She has had no headache, shortness of breath or chest pain. There has been no abdominal pain. No new medications. Today the daughter got her out of bed to take her to get a Covid vaccine. She ambulated with assistance to her wheelchair and then they rolled her in the wheelchair toward the car when she started to slump over. She does not believe she lost consciousness, but just was kind of out of it. Pt did not verbalize any presyncopal symptoms like lightheadedness or dizziness. No seizure like activity. Daughter denies decreased PO intake although notes she sleeps through breakfast. She was brought to the hospital by EMS. She appears comfortable. Pt unable to provide history d/t dementia.       Past Medical History:   Diagnosis Date    A-fib [facility])     Bilateral cataracts     Bronchitis     CKD (chronic kidney disease)     GERD (gastroesophageal reflux disease)     HTN (hypertension)     Hyperlipidemia     Insomnia     Stuttering       Past Surgical History:   Procedure Laterality Date    HX CAROTID ENDARTERECTOMY      HX HYSTERECTOMY      HX TONSILLECTOMY       Prior to Admission medications    Medication Sig Start Date End Date Taking? Authorizing Provider   furosemide (LASIX) 80 mg tablet TAKE 80MG (1 TAB) BY MOUTH ON MONDAY, [address], FRIDAY. TAKE 40MG (1/2 TAB) ON OTHER DAYS. 4/2/21   Odessa Mohr MD   carvediloL (COREG) 12.5 mg tablet Take 1 Tab by mouth two (2) times daily (with meals). 3/23/21   Odessa Mohr MD   ergocalciferol (ERGOCALCIFEROL) 1,250 mcg (50,000 unit) capsule TAKE 1 CAPSULE BY MOUTH ONCE A WEEK 11/25/20   Provider, Historical   escitalopram oxalate (LEXAPRO) 10 mg tablet Take 10 mg by mouth daily. 1/19/21   Provider, Historical   clotrimazole-betamethasone (LOTRISONE) topical cream APPLY TO AFFECTED AREA TWICE A DAY FOR 2 WEEKS 2/15/21   Provider, Historical   rosuvastatin (CRESTOR) 40 mg tablet Take 1 Tab by mouth nightly. 11/19/20   Odessa Mohr MD   cetirizine (ZYRTEC) 10 mg tablet TAKE 1 TABLET BY MOUTH EVERY NIGHT AT BEDTIME AS NEEDED FOR ALLERGIES 6/3/20   Provider, Historical   folic acid (FOLVITE) 1 mg tablet Take 1 mg by mouth daily. Provider, Historical   Eliquis 2.5 mg tablet TAKE 1 TABLET BY MOUTH TWICE A DAY 7/21/20   Odessa Mohr MD   amLODIPine (NORVASC) 5 mg tablet Take 5 mg by mouth two (2) times a day. Provider, Historical   aspirin delayed-release 81 mg tablet Take 81 mg by mouth daily. Provider, Historical   venlafaxine-SR (EFFEXOR-XR) 75 mg capsule Take 75 mg by mouth daily. Provider, Historical     Allergies   Allergen Reactions    Keflex [Cephalexin] Hives      History reviewed. No pertinent family history. Review of Systems:  A comprehensive review of systems was negative except for that written in the History of Present Illness.      Objective:     Physical Exam:   General:          Alert, cooperative, no distress  Lungs:             Clear to auscultation bilaterally, symmetric expansion, no respiratory distress  Chest wall:      No tenderness or deformity  Heart:              Irreg rhythm, reg rate, 2/6 SM  Abdomen:        Soft, non-tender, non-distended  Extremities:     Extremities normal, atraumatic, no cyanosis or edema  Skin:                Skin color, texture, turgor normal. No rashes or lesions  Neurologic:      CNII-XII grossly intact. A&Ox1. GORDON. Occasionally answered questions with garbled unintelligible speech, followed by clear speech. Occasional stutter noted. ECG:  there are no previous tracings available for comparison, normal sinus rhythm, PAC's noted in bigeminy     Data Review: All diagnostic labs and studies have been reviewed.     Assessment:   Active Problems:    Pre-syncope (4/23/2021)      Plan:     Presyncope, generalized weakness and fatigue   Likely multifactorial 2/2 deconditioning, depression, progressive dementia, and decreased PO intake while taking lasix   Gentle IVF   Hold home lasix as last Echo 2/2020 wnl EF 60-65%   PT/OT   Statin   Cont eliquis   TSH wnl   a1c 5.8   Vit D low, start replacement   B12 299, borderline, start replacement and check MMA to confirm   Check mag, phos, folate    Intermittent garbled speech   CT head nothing acute   MRI pending   Low suspicion for acute CVA given symptoms have been intermittent for about a year  Wonder if high BP contributing to sxs, will treat HTN and not allow for permissive HTN at this time   Slight stutter at baseline    HTN urgency   Pt didn't take meds today yet   Cont home meds + IV PRN, increasing coreg limited by low HR at this time     CKD3-4, last Cr 2.1 in 1/2021, 2.04 in 7/2020   Cr near baseline, monitor    pAF, rate controlled   Cont eliquis, Coreg    Depression   Cont home meds    DVT ppx: eliquis    Admit as observation for now    FUNCTIONAL STATUS PRIOR TO HOSPITALIZATION Ambulatory with Use of Assistive Devices    Signed By: Shahzad Pratt MD     April 23, 2021 4:54 PM

## 2021-04-23 NOTE — ED TRIAGE NOTES
Pt arrived with EMS from home for AMS and slurred specch. Per family pt A&O X 2 today, LKW unknown.  for EMS. PMHx for Dementia.

## 2021-04-23 NOTE — PROGRESS NOTES
Admission Medication Reconciliation: In progress:    Unable to speak with patient face to face at this time due to general isolation precautions in the ED related to COVID-19 pandemic. Called [phone] Tyler Mckeon, daughter)--there is no answer, unable to leave voicemail. Will try again later. Will leave progress note and update PTA med list if current information becomes available. Thank you for allowing me to participate in the care of your patient. Thomas GutierrezD, RN # [phone]     [facility] pharmacy benefit data reflects medications filled and processed through the patient's insurance, however   this data does NOT capture whether the medication was picked up or is currently being taken by the patient. Allergies:  Keflex [cephalexin]    Significant PMH/Disease States:   Past Medical History:   Diagnosis Date    A-fib ([address]     Bilateral cataracts     Bronchitis     CKD (chronic kidney disease)     GERD (gastroesophageal reflux disease)     HTN (hypertension)     Hyperlipidemia     Insomnia     Stuttering      Chief Complaint for this Admission:    Chief Complaint   Patient presents with    Altered mental status     Prior to Admission Medications:     Please contact the main inpatient pharmacy with any questions or concerns at [phone] and we will direct you to the clinical pharmacist covering this patient's care while in-house.    EFRAIN Lee

## 2021-04-23 NOTE — ACP (ADVANCE CARE PLANNING)
[facility]  Hospitalist Group                                      Advance Care Planning Note    Name: Megan Ely  YOB: 1932  MRN: [id number]  Admission Date: 4/23/2021  1:37 PM    Date of discussion: 4/23/2021    Active Diagnoses:    Hospital Problems  Date Reviewed: 2/25/2021          Codes Class Noted POA    Pre-syncope ICD-10-CM: R55  ICD-9-CM: 780.2  4/23/2021 Unknown              These active diagnoses are of sufficient risk that focused discussion on advance care planning is indicated in order to allow the patient to thoughtfully consider personal goals of care, and if situations arise that prevent the ability to personally give input, to ensure appropriate representation of their personal desires for different levels and aggressiveness of care. Discussion:     Persons present and participating in discussion: Megan Ely, Shahzad Pratt MD and daughter at bedside    Topics Discussed:   Code Status: [ x ] yes [  ] no     Overview of Discussion: Patient and daughter made it very clear that she is \"NOT a DNR.\" She wants to be a full code and \"wants everything done,\" although she would not want to be on \"life support\" for any prolonged period of time. Time Spent:     Total time spent face-to-face in education and discussion: 5 minutes.      Shahzad Pratt MD  Date of Service:  4/23/2021  6:58 PM

## 2021-04-23 NOTE — ED PROVIDER NOTES
This is an 80-year-old female who lives with the daughter and over the last few days has had some intermittent difficulty with her speech that was very transient. She has had these in the past and they have cleared. She had these 2 episodes and then is been a spending a lot of time in the bed just not wanting to get up. The patient nor the daughter can define that she has had any weakness in her arms or legs. There have been no falls. She has had no headache, shortness of breath or chest pain. There is been no abdominal pain. She denies any other acute symptomatology. She is on no new medications. Today the daughter got her out of bed to take her to get a Covid shot. They rolled her in a wheelchair  to the car and as she was attempting to get into the vehicle, she had what was described as a probable near syncopal episode. The daughter is not sure that she went out completely. She thinks she may not have passed out completely. She is not completely sure. She was brought to the hospital by EMS. She is weak and a little lethargic but responsive, following commands with no focal weakness noted. No sensory deficit is noted. She voices no other complaint. Past Medical History:   Diagnosis Date    A-fib [facility])     Bilateral cataracts     Bronchitis     CKD (chronic kidney disease)     GERD (gastroesophageal reflux disease)     HTN (hypertension)     Hyperlipidemia     Insomnia     Stuttering        Past Surgical History:   Procedure Laterality Date    HX CAROTID ENDARTERECTOMY      HX HYSTERECTOMY      HX TONSILLECTOMY           History reviewed. No pertinent family history.     Social History     Socioeconomic History    Marital status:      Spouse name: Not on file    Number of children: Not on file    Years of education: Not on file    Highest education level: Not on file   Occupational History    Not on file   Social Needs    Financial resource strain: Not on file   [facility] Food insecurity     Worry: Not on file     Inability: Not on file    Transportation needs     Medical: Not on file     Non-medical: Not on file   Tobacco Use    Smoking status: Never Smoker    Smokeless tobacco: Never Used   Substance and Sexual Activity    Alcohol use: Never     Frequency: Never    Drug use: Never    Sexual activity: Not on file   Lifestyle    Physical activity     Days per week: Not on file     Minutes per session: Not on file    Stress: Not on file   Relationships    Social connections     Talks on phone: Not on file     Gets together: Not on file     Attends Nondenominational service: Not on file     Active member of club or organization: Not on file     Attends meetings of clubs or organizations: Not on file     Relationship status: Not on file    Intimate partner violence     Fear of current or ex partner: Not on file     Emotionally abused: Not on file     Physically abused: Not on file     Forced sexual activity: Not on file   Other Topics Concern    Not on file   Social History Narrative    Not on file         ALLERGIES: Keflex [cephalexin]    Review of Systems   Constitutional: Positive for fatigue. Negative for activity change, appetite change, chills, diaphoresis and fever. HENT: Negative for ear pain, facial swelling, sore throat and trouble swallowing. Eyes: Negative for pain, discharge and visual disturbance. Respiratory: Negative for chest tightness, shortness of breath and wheezing. Cardiovascular: Negative for chest pain and palpitations. Gastrointestinal: Negative for abdominal pain, blood in stool, nausea and vomiting. Genitourinary: Negative for difficulty urinating, flank pain and hematuria. Musculoskeletal: Negative for arthralgias, joint swelling, myalgias and neck pain. Skin: Negative for color change and rash. Neurological: Positive for speech difficulty and weakness. Negative for dizziness, numbness and headaches.    Hematological: Negative for adenopathy. Does not bruise/bleed easily. Psychiatric/Behavioral: Negative for behavioral problems, confusion and sleep disturbance. All other systems reviewed and are negative. Vitals:    04/23/21 1346   BP: (!) 182/63   Pulse: 66   Resp: 11   Temp: 97.8 °F (36.6 °C)            Physical Exam  Vitals signs and nursing note reviewed. Constitutional:       General: She is not in acute distress. Appearance: She is well-developed. She is not ill-appearing. Comments: This is an 40-year-old female who is alert and oriented x3 currently. Vital signs as noted. HENT:      Head: Normocephalic and atraumatic. Nose: Nose normal.   Eyes:      General: No visual field deficit or scleral icterus. Conjunctiva/sclera: Conjunctivae normal.      Pupils: Pupils are equal, round, and reactive to light. Neck:      Musculoskeletal: Normal range of motion and neck supple. Thyroid: No thyromegaly. Vascular: No JVD. Trachea: No tracheal deviation. Comments: No carotid bruits noted. Cardiovascular:      Rate and Rhythm: Normal rate and regular rhythm. Heart sounds: Normal heart sounds. No murmur. No friction rub. No gallop. Pulmonary:      Effort: Pulmonary effort is normal. No respiratory distress. Breath sounds: Normal breath sounds. No wheezing or rales. Chest:      Chest wall: No tenderness. Abdominal:      General: Bowel sounds are normal. There is no distension. Palpations: Abdomen is soft. There is no mass. Tenderness: There is no abdominal tenderness. There is no guarding or rebound. Musculoskeletal: Normal range of motion. General: No tenderness. Lymphadenopathy:      Cervical: No cervical adenopathy. Skin:     General: Skin is warm and dry. Coloration: Skin is pale. Findings: No erythema or rash. Neurological:      Mental Status: She is alert and oriented to person, place, and time.       Cranial Nerves: No cranial nerve deficit, dysarthria or facial asymmetry. Sensory: No sensory deficit. Motor: No weakness. Coordination: Coordination normal.      Deep Tendon Reflexes: Reflexes are normal and symmetric. Psychiatric:         Mood and Affect: Mood normal.         Speech: Speech normal.         Behavior: Behavior normal.         Thought Content: Thought content normal.         Judgment: Judgment normal.          MDM  Number of Diagnoses or Management Options  Near syncope: new and requires workup     Amount and/or Complexity of Data Reviewed  Clinical lab tests: ordered and reviewed  Tests in the radiology section of CPT®: ordered and reviewed  Decide to obtain previous medical records or to obtain history from someone other than the patient: yes  Obtain history from someone other than the patient: yes  Review and summarize past medical records: yes  Discuss the patient with other providers: yes  Independent visualization of images, tracings, or specimens: yes    Risk of Complications, Morbidity, and/or Mortality  Presenting problems: high  Diagnostic procedures: high  Management options: high    Patient Progress  Patient progress: stable         Procedures    This is an 59-year-old female who presents with a near syncopal episode. She is alert and oriented at this time without any acut complaint or finding. Will obtain labs and CT. Patient will likely be admitted due to this episode and her recurring episodes of dysarthria. ED MD EKG interpretation: Normal sinus rhythm with a rate at 60 beats a minute. Left axis shift is noted. Nonspecific ST and T wave changes. No acute ischemic changes noted. Elma Hernandez MD    Labs and imaging are inconclusive. Will admit for ? Syncope or near syncope and several episodes of dysarthria. ? TIA? Perfect Serve Consult for Admission  3:43 PM    ED Room Number: WJ11/35  Patient Name and age:  Leopoldo Palomino 80 y.o.  female  Working Diagnosis:   1.  Near syncope COVID-19 Suspicion:  no  Sepsis present:  no  Reassessment needed: no  Code Status:  Full Code  Readmission: no  Isolation Requirements:  no  Recommended Level of Care:  telemetry  Department:[facility] Adult ED - [facility]   Other:

## 2021-04-24 ENCOUNTER — APPOINTMENT (OUTPATIENT)
Dept: MRI IMAGING | Age: 86
DRG: 065 | End: 2021-04-24
Attending: INTERNAL MEDICINE
Payer: MEDICARE

## 2021-04-24 PROBLEM — I63.9 CVA (CEREBRAL VASCULAR ACCIDENT) (HCC): Status: ACTIVE | Noted: 2021-04-24

## 2021-04-24 LAB
ANION GAP SERPL CALC-SCNC: 7 MMOL/L (ref 5–15)
BUN SERPL-MCNC: 30 MG/DL (ref 6–20)
BUN/CREAT SERPL: 17 (ref 12–20)
CALCIUM SERPL-MCNC: 8.8 MG/DL (ref 8.5–10.1)
CHLORIDE SERPL-SCNC: 104 MMOL/L (ref 97–108)
CO2 SERPL-SCNC: 24 MMOL/L (ref 21–32)
CREAT SERPL-MCNC: 1.78 MG/DL (ref 0.55–1.02)
ERYTHROCYTE [DISTWIDTH] IN BLOOD BY AUTOMATED COUNT: 12.7 % (ref 11.5–14.5)
GLUCOSE SERPL-MCNC: 110 MG/DL (ref 65–100)
HCT VFR BLD AUTO: 37.1 % (ref 35–47)
HCYS SERPL-SCNC: 25.1 UMOL/L (ref 3.7–13.9)
HGB BLD-MCNC: 12.1 G/DL (ref 11.5–16)
MCH RBC QN AUTO: 28.7 PG (ref 26–34)
MCHC RBC AUTO-ENTMCNC: 32.6 G/DL (ref 30–36.5)
MCV RBC AUTO: 87.9 FL (ref 80–99)
NRBC # BLD: 0 K/UL (ref 0–0.01)
NRBC BLD-RTO: 0 PER 100 WBC
PLATELET # BLD AUTO: 261 K/UL (ref 150–400)
PMV BLD AUTO: 10 FL (ref 8.9–12.9)
POTASSIUM SERPL-SCNC: 3.9 MMOL/L (ref 3.5–5.1)
RBC # BLD AUTO: 4.22 M/UL (ref 3.8–5.2)
SODIUM SERPL-SCNC: 135 MMOL/L (ref 136–145)
WBC # BLD AUTO: 9.4 K/UL (ref 3.6–11)

## 2021-04-24 PROCEDURE — 99218 HC RM OBSERVATION: CPT

## 2021-04-24 PROCEDURE — 65660000000 HC RM CCU STEPDOWN

## 2021-04-24 PROCEDURE — 80048 BASIC METABOLIC PNL TOTAL CA: CPT

## 2021-04-24 PROCEDURE — 74011250637 HC RX REV CODE- 250/637: Performed by: INTERNAL MEDICINE

## 2021-04-24 PROCEDURE — 85027 COMPLETE CBC AUTOMATED: CPT

## 2021-04-24 PROCEDURE — 99223 1ST HOSP IP/OBS HIGH 75: CPT | Performed by: PSYCHIATRY & NEUROLOGY

## 2021-04-24 PROCEDURE — 36415 COLL VENOUS BLD VENIPUNCTURE: CPT

## 2021-04-24 PROCEDURE — 97530 THERAPEUTIC ACTIVITIES: CPT

## 2021-04-24 PROCEDURE — 83090 ASSAY OF HOMOCYSTEINE: CPT

## 2021-04-24 PROCEDURE — 97161 PT EVAL LOW COMPLEX 20 MIN: CPT

## 2021-04-24 PROCEDURE — 74011250636 HC RX REV CODE- 250/636: Performed by: INTERNAL MEDICINE

## 2021-04-24 PROCEDURE — 70551 MRI BRAIN STEM W/O DYE: CPT

## 2021-04-24 RX ADMIN — CYANOCOBALAMIN TAB 500 MCG 1000 MCG: 500 TAB at 09:51

## 2021-04-24 RX ADMIN — Medication 10 ML: at 21:46

## 2021-04-24 RX ADMIN — HYDRALAZINE HYDROCHLORIDE 20 MG: 20 INJECTION INTRAMUSCULAR; INTRAVENOUS at 21:45

## 2021-04-24 RX ADMIN — Medication 10 ML: at 17:15

## 2021-04-24 RX ADMIN — APIXABAN 2.5 MG: 2.5 TABLET, FILM COATED ORAL at 17:10

## 2021-04-24 RX ADMIN — AMLODIPINE BESYLATE 5 MG: 5 TABLET ORAL at 09:51

## 2021-04-24 RX ADMIN — ATORVASTATIN CALCIUM 40 MG: 40 TABLET, FILM COATED ORAL at 21:46

## 2021-04-24 RX ADMIN — Medication 10 ML: at 05:35

## 2021-04-24 RX ADMIN — CARVEDILOL 12.5 MG: 12.5 TABLET, FILM COATED ORAL at 09:52

## 2021-04-24 RX ADMIN — ESCITALOPRAM OXALATE 10 MG: 10 TABLET ORAL at 09:51

## 2021-04-24 RX ADMIN — HYDRALAZINE HYDROCHLORIDE 20 MG: 20 INJECTION INTRAMUSCULAR; INTRAVENOUS at 18:04

## 2021-04-24 RX ADMIN — CARVEDILOL 12.5 MG: 12.5 TABLET, FILM COATED ORAL at 17:10

## 2021-04-24 RX ADMIN — APIXABAN 2.5 MG: 2.5 TABLET, FILM COATED ORAL at 09:51

## 2021-04-24 NOTE — CONSULTS
[facility] [place]   NEW PATIENT EVALUATION/CONSULTATION       PATIENT NAME: Marci Daly    MRN: [id number]    REASON FOR CONSULTATION: Pontine ischemic lacunar infarct     04/24/21    HISTORY OF PRESENT ILLNESS:  Marci Daly is a 80 y.o. right-hand-dominant female with a somewhat vague past medical history presented to [place] [facility] on April 23 with occurrence of presyncope. Apparently the patient has had a somewhat of a gradual decline over the past year after moving in with family having previously lived on her own in [place]. Last January February, she had a fall and was hospitalized in [place] with UTI which point she was noted to be confused with some doctor there saying that was just her dementia. Daughter says that she was never formally diagnosed but that since that time the repairs where she is prone to confusion as well as have difficulty with language which is stuttering and not making much sense. Over the past few weeks patient has been less interactive wanting to stay in bed more than usual sleeping through morning meals etc.  Yesterday when attempting to go out to get her second dose of Covid vaccine the patient was brought into the wheelchair where she apparently slumped over and was already looking pale even before the event. She then came to the hospital for further evaluation. She was admitted for presyncope/syncope and eventually underwent MRI which demonstrated a punctuate area of diffusion restriction in the right paramedian александр. Currently daughter says that her speech deficit is much worse than it ever has been is typically will be transient but now it is fixed and that she seems more confused in general.  Does have a history of having had an episode proximal a year ago where she had transient confusion which lasted 10 to 15 minutes.   Patient does have A. fib and takes adjusted dose Eliquis which she takes usually as prescribed though there will be sometimes that they forget to give a dose or if patient sleeps too late in the morning/early afternoon the morning dose will be missed. PAST MEDICAL HISTORY:  Past Medical History:   Diagnosis Date    A-fib [facility])     Bilateral cataracts     Bronchitis     CKD (chronic kidney disease)     GERD (gastroesophageal reflux disease)     HTN (hypertension)     Hyperlipidemia     Insomnia     Stuttering        PAST SURGICAL HISTORY:  Past Surgical History:   Procedure Laterality Date    HX CAROTID ENDARTERECTOMY      HX HYSTERECTOMY      HX TONSILLECTOMY         FAMILY HISTORY:   History reviewed. No pertinent family history.       SOCIAL HISTORY:  Social History     Socioeconomic History    Marital status:      Spouse name: Not on file    Number of children: Not on file    Years of education: Not on file    Highest education level: Not on file   Tobacco Use    Smoking status: Never Smoker    Smokeless tobacco: Never Used   Substance and Sexual Activity    Alcohol use: Never     Frequency: Never    Drug use: Never         MEDICATIONS:   Current Facility-Administered Medications   Medication Dose Route Frequency Provider Last Rate Last Admin    zinc oxide-cod liver oil (DESITIN) 40 % paste   Topical PRN Abi Man M, DO        sodium chloride (NS) flush 5-40 mL  5-40 mL IntraVENous Q8H Greta Pearce MD   10 mL at 04/24/21 0535    sodium chloride (NS) flush 5-40 mL  5-40 mL IntraVENous PRN Greta Pearce MD        acetaminophen (TYLENOL) tablet 650 mg  650 mg Oral Q6H PRN Greta Pearce MD        Or    acetaminophen (TYLENOL) suppository 650 mg  650 mg Rectal Q6H PRN Greta Pearce MD        polyethylene glycol (MIRALAX) packet 17 g  17 g Oral DAILY PRN Greta Pearce MD        ondansetron (ZOFRAN ODT) tablet 4 mg  4 mg Oral Q6H PRN Greta Pearce MD        Or    ondansetron [facility]) injection 4 mg  4 mg IntraVENous Q6H PRN Greta Pearce MD        hydrALAZINE (APRESOLINE) 20 mg/mL injection 20 mg  20 mg IntraVENous Q2H PRN Darlin Adkins MD   20 mg at 04/23/21 1827    carvediloL (COREG) tablet 12.5 mg  12.5 mg Oral BID WITH MEALS Darlin Adkins MD   12.5 mg at 04/24/21 8673    apixaban (ELIQUIS) tablet 2.5 mg  2.5 mg Oral BID Darlin Adkins MD   2.5 mg at 04/24/21 7771    escitalopram oxalate (LEXAPRO) tablet 10 mg  10 mg Oral DAILY Darlin Adkins MD   10 mg at 04/24/21 3324    atorvastatin (LIPITOR) tablet 40 mg  40 mg Oral QHS Darlin Adkins MD   40 mg at 04/23/21 2139    amLODIPine (NORVASC) tablet 5 mg  5 mg Oral DAILY Darlin Adkins MD   5 mg at 04/24/21 9641    0.45% sodium chloride infusion  75 mL/hr IntraVENous CONTINUOUS Darlin Adkins MD 75 mL/hr at 04/23/21 2141 75 mL/hr at 04/23/21 2141    ergocalciferol capsule 50,000 Units  50,000 Units Oral Q7D Darlin Adkins MD   50,000 Units at 04/23/21 1922    cyanocobalamin (VITAMIN B12) tablet 1,000 mcg  1,000 mcg Oral DAILY Darlin Adkins MD   1,000 mcg at 04/24/21 1313         ALLERGIES:  Allergies   Allergen Reactions    Keflex [Cephalexin] Hives         REVIEW OF SYSTEMS:  10 point ROS reviewed with patient. Please see scanned document under media. PHYSICAL EXAM:  Vital Signs:   Visit Vitals  BP (!) 160/97 (BP 1 Location: Left upper arm, BP Patient Position: At rest)   Pulse 63   Temp 98 °F (36.7 °C)   Resp 14   SpO2 98%     Physical examination:  Pleasant female sitting comfortably in bed initially non-interactive. HEENT appears grossly intact, neck supple. Cardiovascular demonstrates constant S1/S2, regular rate and rhythm. Pulmonary demonstrates equal air entry bilaterally. Abdomen is nondistended, nontender. Extremities warm/dry. Neurologically, patient is oriented to self, location, month not year and situation. Attention appears impaired. Speech is intermittently clear at otherwise stuttering and garbled. Language is nonfluent, follows basic commands with impairment in naming.   Cranial to the 12 appear grossly intact. Motorically patient moves all extremities symmetrically with generalized weakness. Sensation appears grossly intact. Coordination is intact in upper extremities. Remainder examination is deferred. PERTINENT DATA:  B12 299    CT Results (maximum last 3): Results from [place] encounter on 04/23/21   CT HEAD WO CONT    Narrative EXAM: CT HEAD WO CONT    INDICATION: AMS    COMPARISON: None. CONTRAST: None. TECHNIQUE: Unenhanced CT of the head was performed using 5 mm images. Brain and  bone windows were generated. Coronal and sagittal reformats. CT dose reduction  was achieved through use of a standardized protocol tailored for this  examination and automatic exposure control for dose modulation. There is motion  artifact    FINDINGS:  Ventricles and cisterns are enlarged compatible with the degree of fine loss. Moderate to severe low density within the periventricular white matter. . There  is no intracranial hemorrhage, extra-axial collection, or mass effect. The  basilar cisterns are open. No CT evidence of acute infarct. The bone windows demonstrate no abnormalities. The visualized portions of the  paranasal sinuses and mastoid air cells are clear. Impression Volume loss and white matter changes. No acute intracranial abnormality           MRI Results (maximum last 3): Results from [place] encounter on 04/23/21   MRI BRAIN WO CONT    Narrative EXAM: MRI BRAIN WO CONT    INDICATION: intermittent garbled speech r/o cva    COMPARISON: CT 4/23/2021. CONTRAST: None. TECHNIQUE:    Multiplanar multisequence acquisition without contrast of the brain. FINDINGS:  There is a 5 mm focus of restricted diffusion in the anterior right paramedian  александр. This is at the mid to inferior level of the александр. The finding is  consistent with an acute infarction. No other acute infarction is shown.      There is mild-moderate prominence of the ventricles and cortical sulci  consistent with atrophy. Abundant bilateral cerebral periventricular and centrum  semiovale white matter signal alteration is shown with nonspecific though  compatible with chronic small vessel ischemic disease the white matter. No  intra-axial or extra-axial mass lesion is demonstrated. The vascular flow voids  at the base the brain appear normal in conspicuity. The unenhanced sella, optic chiasm, orbits and paranasal sinuses are normal.      Impression Lacunar acute infarction of the anterior right paramedian александр.        ASSESSMENT:      Ulises Painting is a 80year old right-hand-dominant female admitted to [address] [place] [address] for presyncopal episode in the setting of several weeks of deconditioning/decreased p.o. intake found to have a possibly incidental right paramedian pontine ischemic infarct    PLAN:  Paramedian infarct:  Appears in region of cerebral peduncles, possible of the infarct has interrupted cerebellar relays leading to cognitive/speech deficit somewhat confounded by premorbid history of intermittent speech deficits  Agree with lipid panel, will obtain carotid Doppler to evaluate for anterograde flow, no need for more advanced cerebrovascular imaging in this given the patient would not be a good candidate for dual antiplatelet therapy in addition to Eliquis  Will check echocardiogram to evaluate for any potential cardiogenic contributors to syncope/presyncope though suspect orthostatic/hypovolemic  Would give patient only isotonic saline replacement as hypertonic saline can potentially worsen edema from stroke  Also evaluate for mural thrombus on echo given that patient's compliance with medication is less than 100%  Other than diagnostics would not add aspirin at this point, patient has been started on Lipitor which is reasonable though would certainly discontinue it or adjusted based on lipid panel  Regarding patient's premorbid cognitive/language impairment do suspect some element of underlying dementia this is never been formally diagnosed  Has been started on B12 supplementation given her low normal B12 and MMA checked we will add homocystine as well  Would likely target normotensive blood pressure goals for a standard  Sherley Lesch, MD       CC Referring provider:  No referring provider defined for this encounter.     Other, MD Gail

## 2021-04-24 NOTE — PROGRESS NOTES
[address] [facility]  Hospitalist Group                                                                                          Hospitalist Progress Note  Amber Waldemar Dance, DO  Answering service: [phone] OR [phone] from in house phone        Date of Service:  2021  NAME:  Ulises Painting  :  1932  MRN:  [id number]      Admission Summary:   80 y.o. female with h/o dementia, HTN, afib on Eliquis, stuttering, and CKD who lives with her daughter who over the last few days has had some transient mumbling/garbling of speech. She has had transient episodes just like this over the last year or so that always resolve. At some point she was told she had a TIA and was put on Eliquis for afib. Sometimes they last up to a day. It occurred a few times during my exam and consisted of intermittent garbled speech with difficulty understanding what she was trying to say. Daughter also notes progressive generalized weakness and fatigue and her not wanting to get out of bed. No falls. She has had no headache, shortness of breath or chest pain. There has been no abdominal pain.  No new medications.  Today the daughter got her out of bed to take her to get a Covid vaccine. She ambulated with assistance to her wheelchair and then they rolled her in the wheelchair toward the car when she started to slump over. She does not believe she lost consciousness, but just was kind of out of it. Pt did not verbalize any presyncopal symptoms like lightheadedness or dizziness. No seizure like activity. Daughter denies decreased PO intake although notes she sleeps through breakfast. She was brought to the hospital by EMS. She appears comfortable. Pt unable to provide history d/t dementia.         Interval history / Subjective: Follow up speech difficulties. Patient seen and examined. AAOx2. MRI with acute lacunar infarct. Results communicated to daughter, Natan Mclean.       Assessment & Plan:     Acute lacunar infarct: -presented with dysarthria, generalized weakness   -MRI with lacunar acute infarction of the anterior right paramedian александр  -neurology consulted  -continue home eliquis. Further antiplatelet per neuro  -check lipid panel, Ha1c 5.8. Continue atorvastatin 40 mg once daily   -unable to do CTA head/neck due to elevated Cr. 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 Most recent are:  Visit Vitals  BP (!) 166/69 (BP 1 Location: Left arm, BP Patient Position: At rest)   Pulse 68   Temp 98 °F (36.7 °C)   Resp 14   SpO2 98%       No intake or output data in the 24 hours ending 04/24/21 1024     Physical Examination:     I had a face to face encounter with this patient and independently examined them on 4/24/2021 as outlined below:          Constitutional:  No acute distress, cooperative, pleasant, elderly female    ENT:  Oral mucosa moist, oropharynx benign. Resp:  CTA bilaterally. No wheezing/rhonchi/rales. No accessory muscle use   CV:  Regular rhythm, normal rate, no murmurs, gallops, rubs    GI:  Soft, non distended, non tender.  normoactive bowel sounds, no hepatosplenomegaly     Musculoskeletal:  No edema, warm, 2+ pulses throughout Neurologic:  Moves all extremities. AAOx2, intermitted dysarthria           Data Review:    Review and/or order of clinical lab test  Review and/or order of tests in the radiology section of CPT  Review and/or order of tests in the medicine section of CPT      Labs:     Recent Labs     04/24/21  0127 04/23/21  1354   WBC 9.4 8.3   HGB 12.1 13.3   HCT 37.1 41.7    274     Recent Labs     04/24/21  0127 04/23/21  1800 04/23/21  1354   *  --  137   K 3.9  --  4.1     --  107   CO2 24  --  24   BUN 30*  --  30*   CREA 1.78*  --  1.83*   *  --  148*   CA 8.8  --  8.6   MG  --  2.0  --    PHOS  --  3.5  --      Recent Labs     04/23/21  1354   ALT 10*   *   TBILI 0.3   TP 6.6   ALB 2.4*   GLOB 4.2*     No results for input(s): INR, PTP, APTT, INREXT in the last 72 hours. No results for input(s): FE, TIBC, PSAT, FERR in the last 72 hours. Lab Results   Component Value Date/Time    Folate 14.2 04/23/2021 06:00 PM      No results for input(s): PH, PCO2, PO2 in the last 72 hours.   Recent Labs     04/23/21  1354   TROIQ <0.05     Lab Results   Component Value Date/Time    Cholesterol, total 133 02/28/2020 05:01 AM    HDL Cholesterol 55 02/28/2020 05:01 AM    LDL, calculated 37.8 02/28/2020 05:01 AM    Triglyceride 201 (H) 02/28/2020 05:01 AM    CHOL/HDL Ratio 2.4 02/28/2020 05:01 AM     No results found for: [facility]  Lab Results   Component Value Date/Time    Color YELLOW/STRAW 04/23/2021 03:44 PM    Appearance CLEAR 04/23/2021 03:44 PM    Specific gravity 1.020 04/23/2021 03:44 PM    pH (UA) 7.0 04/23/2021 03:44 PM    Protein >300 (A) 04/23/2021 03:44 PM    Glucose 250 (A) 04/23/2021 03:44 PM    Ketone Negative 04/23/2021 03:44 PM    Bilirubin Negative 04/23/2021 03:44 PM    Urobilinogen 0.2 04/23/2021 03:44 PM    Nitrites Negative 04/23/2021 03:44 PM    Leukocyte Esterase Negative 04/23/2021 03:44 PM    Epithelial cells FEW 04/23/2021 03:44 PM    Bacteria 1+ (A) 04/23/2021 03:44 PM    WBC 5-10 04/23/2021 03:44 PM    RBC 0-5 04/23/2021 03:44 PM         Medications Reviewed:     Current Facility-Administered Medications   Medication Dose Route Frequency    sodium chloride (NS) flush 5-40 mL  5-40 mL IntraVENous Q8H    sodium chloride (NS) flush 5-40 mL  5-40 mL IntraVENous PRN    acetaminophen (TYLENOL) tablet 650 mg  650 mg Oral Q6H PRN    Or    acetaminophen (TYLENOL) suppository 650 mg  650 mg Rectal Q6H PRN    polyethylene glycol (MIRALAX) packet 17 g  17 g Oral DAILY PRN    ondansetron (ZOFRAN ODT) tablet 4 mg  4 mg Oral Q6H PRN    Or    ondansetron (ZOFRAN) injection 4 mg  4 mg IntraVENous Q6H PRN    hydrALAZINE (APRESOLINE) 20 mg/mL injection 20 mg  20 mg IntraVENous Q2H PRN    carvediloL (COREG) tablet 12.5 mg  12.5 mg Oral BID WITH MEALS    apixaban (ELIQUIS) tablet 2.5 mg  2.5 mg Oral BID    escitalopram oxalate (LEXAPRO) tablet 10 mg  10 mg Oral DAILY    atorvastatin (LIPITOR) tablet 40 mg  40 mg Oral QHS    amLODIPine (NORVASC) tablet 5 mg  5 mg Oral DAILY    0.45% sodium chloride infusion  75 mL/hr IntraVENous CONTINUOUS    ergocalciferol capsule 50,000 Units  50,000 Units Oral Q7D    cyanocobalamin (VITAMIN B12) tablet 1,000 mcg  1,000 mcg Oral DAILY     ______________________________________________________________________  EXPECTED LENGTH OF STAY: - - -  ACTUAL LENGTH OF STAY:          0                 Abi Blackmon DO

## 2021-04-24 NOTE — PROGRESS NOTES
Problem: Falls - Risk of  Goal: *Absence of Falls  Description: Document Jaye Dougherty Fall Risk and appropriate interventions in the flowsheet. Outcome: Progressing Towards Goal  Note: Fall Risk Interventions:  Mobility Interventions: Patient to call before getting OOB         Medication Interventions: Patient to call before getting OOB    Elimination Interventions: Call light in reach              Problem: Pressure Injury - Risk of  Goal: *Prevention of pressure injury  Description: Document Gigi Scale and appropriate interventions in the flowsheet.   Outcome: Progressing Towards Goal  Note: Pressure Injury Interventions:       Moisture Interventions: Absorbent underpads    Activity Interventions: PT/OT evaluation    Mobility Interventions: HOB 30 degrees or less    Nutrition Interventions: Document food/fluid/supplement intake

## 2021-04-24 NOTE — PROGRESS NOTES
PT Note:    Orders received, chart reviewed and patient evaluated by physical therapy. Pending progression with skilled acute physical therapy, recommend:  Therapy up to 5 days/week in SNF setting    Recommend with nursing bed in chair position or sitting EOB 3 x daily min A assist. Thank you for completing as able in order to maintain patient strength, endurance and independence. Full evaluation to follow.

## 2021-04-24 NOTE — ROUTINE PROCESS
TRANSFER - OUT REPORT:    Verbal report given to DAJUAN Sosa(name) on Francisca Regan  being transferred to 6S Rm 665(unit) for routine progression of care       Report consisted of patients Situation, Background, Assessment and   Recommendations(SBAR). Information from the following report(s) SBAR, Kardex and ED Summary was reviewed with the receiving nurse. Lines:   Peripheral IV 04/23/21 Right Forearm (Active)   Site Assessment Clean, dry, & intact 04/23/21 1348   Phlebitis Assessment 0 04/23/21 1348   Infiltration Assessment 0 04/23/21 1348   Dressing Status Clean, dry, & intact 04/23/21 1348   Hub Color/Line Status Pink 04/23/21 1348   Action Taken Blood drawn 04/23/21 1348        Opportunity for questions and clarification was provided.       Patient transported with:   [employer]

## 2021-04-24 NOTE — PROGRESS NOTES
Problem: Mobility Impaired (Adult and Pediatric)  Goal: *Acute Goals and Plan of Care (Insert Text)  Description: FUNCTIONAL STATUS PRIOR TO ADMISSION: Patient was modified independent using a cane or rolling walker for functional mobility in the home, wheelchair for community distances. HOME SUPPORT PRIOR TO ADMISSION: The patient lived with daughter but did not typically need assistance with mobility. Physical Therapy Goals  Initiated 4/24/2021  1. Patient will move from supine to sit and sit to supine , scoot up and down, and roll side to side in bed with supervision/set-up within 7 day(s). 2.  Patient will transfer from bed to chair and chair to bed with supervision/set-up using the least restrictive device within 7 day(s). 3.  Patient will perform sit to stand with supervision/set-up within 7 day(s). 4.  Patient will ambulate with supervision/set-up for 100 feet with the least restrictive device within 7 day(s). 5.  Patient will ascend/descend 3 stairs with 0 handrail(s) with supervision/set-up within 7 day(s). Outcome: Progressing Towards Goal     PHYSICAL THERAPY EVALUATION- NEURO POPULATION  Patient: Ashwini Chaudhary (86 y.o. female)  Date: 4/24/2021  Primary Diagnosis: Pre-syncope [R55]  CVA (cerebral vascular accident) [facility]) [I63.9]        Precautions:   Fall      ASSESSMENT  Based on the objective data described below, the patient presents with generalized weakness, impaired sitting balance, increased fatigue with mobility, and overall decreased independence with functional mobility compared to baseline following admission for CVA. 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 Patient overall min A for bed mobility with increased time and cues for technique. Patient's BP elevated throughout session. MERINO inferred due to elevated BP-recommend reassessing in future session.   Deferred further mobility due to patient fatigue and request to return to supine. Patient with significant garbled speech although intermittent clear speech noted. Daughter present and requesting rehab at discharge-recommend SNF. Current Level of Function Impacting Discharge (mobility/balance): min A for bed mobility    Functional Outcome Measure: The patient scored Total: 3/56 on the [facility] Assessment which is indicative of high fall risk. Other factors to consider for discharge:      Patient will benefit from skilled therapy intervention to address the above noted impairments. PLAN :  Recommendations and Planned Interventions: bed mobility training, transfer training, gait training, therapeutic exercises, neuromuscular re-education, patient and family training/education, and therapeutic activities      Frequency/Duration: Patient will be followed by physical therapy:  5 times a week to address goals. Recommendation for discharge: (in order for the patient to meet his/her long term goals)  Therapy up to 5 days/week in SNF setting    This discharge recommendation:  Has not yet been discussed the attending provider and/or case management    IF patient discharges home will need the following DME: patient owns DME required for discharge         SUBJECTIVE:   Patient stated Juanjo Bustilloing.     OBJECTIVE DATA SUMMARY:   HISTORY:    Past Medical History:   Diagnosis Date    A-fib ([address]     Bilateral cataracts     Bronchitis     CKD (chronic kidney disease)     GERD (gastroesophageal reflux disease)     HTN (hypertension)     Hyperlipidemia     Insomnia     Stuttering      Past Surgical History:   Procedure Laterality Date    HX CAROTID ENDARTERECTOMY      HX HYSTERECTOMY      HX TONSILLECTOMY         Personal factors and/or comorbidities impacting plan of care:     Home Situation  Home Environment: Private residence  # Steps to Enter: 3  Rails to Enter: No  One/Two Story Residence: Two story, live on 1st floor  Living Alone: No  Support Systems: Child(liliana)  Current DME Used/Available at Home: Milas Canard, straight, Wheelchair, Walker, rolling    EXAMINATION/PRESENTATION/DECISION MAKING:   Critical Behavior:  Neurologic State: Appropriate for age, Eyes open spontaneously  Orientation Level: Oriented X4  Cognition: Follows commands     Hearing:     Skin:    Edema:   Range Of Motion:  AROM: Generally decreased, functional           PROM: Generally decreased, functional           Strength:    Strength: Generally decreased, functional                    Tone & Sensation:                                  Coordination:  Coordination: Generally decreased, functional  Vision:      Functional Mobility:  Bed Mobility:     Supine to Sit: Minimum assistance  Sit to Supine: Minimum assistance     Transfers:                             Balance:   Sitting: Impaired  Sitting - Static: Good (unsupported)  Sitting - Dynamic: Fair (occasional)  Ambulation/Gait Training:                                                         Stairs: Therapeutic Exercises:       Functional Measure  [place] Balance Test:    Sitting to Standin  Standing Unsupported: 0  Sitting with Back Unsupported: 3  Standing to Sittin  Transfers: 0  Standing Unsupported with Eyes Closed: 0  Standing Unsupported with Feet Together: 0  Reach Forward with Outstretched Arm: 0   Object: 0  Turn to Look Over Shoulders: 0  Turn 360 Degrees: 0  Alternate Foot on Step/Stool: 0  Standing Unsupported One Foot in Front: 0  Stand on One Le  Total: 3         56=Maximum possible score;   0-20=High fall risk  21-40=Moderate fall risk   41-56=Low fall risk       Activity Tolerance:   Fair and requires rest breaks      After treatment patient left in no apparent distress:   Supine in bed, Call bell within reach, and Caregiver / family present    COMMUNICATION/EDUCATION:   The patients plan of care was discussed with: Registered nurse.      Fall prevention education was provided and the patient/caregiver indicated understanding., Patient/family have participated as able in goal setting and plan of care. , and Patient/family agree to work toward stated goals and plan of care.     Thank you for this referral.  Balaji Panda, PT   Time Calculation: 21 mins

## 2021-04-24 NOTE — PROGRESS NOTES
Problem: Falls - Risk of  Goal: *Absence of Falls  Description: Document Lorne Broad Fall Risk and appropriate interventions in the flowsheet. Outcome: Progressing Towards Goal  Note: Fall Risk Interventions:  Mobility Interventions: Assess mobility with egress test, Bed/chair exit alarm, Communicate number of staff needed for ambulation/transfer, Patient to call before getting OOB, Strengthening exercises (ROM-active/passive)         Medication Interventions: Assess postural VS orthostatic hypotension, Bed/chair exit alarm, Evaluate medications/consider consulting pharmacy, Patient to call before getting OOB, Teach patient to arise slowly    Elimination Interventions: Bed/chair exit alarm, Call light in reach, Elevated toilet seat, Patient to call for help with toileting needs, Stay With Me (per policy), Toilet paper/wipes in reach, Toileting schedule/hourly rounds              Problem: Patient Education: Go to Patient Education Activity  Goal: Patient/Family Education  Outcome: Progressing Towards Goal     Problem: Pressure Injury - Risk of  Goal: *Prevention of pressure injury  Description: Document Gigi Scale and appropriate interventions in the flowsheet. Outcome: Progressing Towards Goal  Note: Pressure Injury Interventions:       Moisture Interventions: Absorbent underpads, Apply protective barrier, creams and emollients, Assess need for specialty bed, Check for incontinence Q2 hours and as needed, Limit adult briefs, Maintain skin hydration (lotion/cream), Minimize layers, Moisture barrier, Offer toileting Q_hr    Activity Interventions: Assess need for specialty bed, Increase time out of bed, Pressure redistribution bed/mattress(bed type), PT/OT evaluation    Mobility Interventions: Assess need for specialty bed, Float heels, HOB 30 degrees or less, Pressure redistribution bed/mattress (bed type), PT/OT evaluation, Turn and reposition approx.  every two hours(pillow and wedges)    Nutrition Interventions: Document food/fluid/supplement intake, Offer support with meals,snacks and hydration    Friction and Shear Interventions: Apply protective barrier, creams and emollients, Feet elevated on foot rest, HOB 30 degrees or less, Lift sheet, Lift team/patient mobility team, Minimize layers, Transferring/repositioning devices                Problem: Patient Education: Go to Patient Education Activity  Goal: Patient/Family Education  Outcome: Progressing Towards Goal     Problem: Pain - Acute  Goal: *Control of acute pain  Outcome: Progressing Towards Goal     Problem: Patient Education: Go to Patient Education Activity  Goal: Patient/Family Education  Outcome: Progressing Towards Goal

## 2021-04-24 NOTE — PROGRESS NOTES
I have reviewed, edited, and agree [facility]. Bedside and Verbal shift change report given to 2021 Yelena Julio (oncoming nurse) by Corinne Artist (offgoing nurse). Report included the following information MAR, Cardiac Rhythm NSR, Quality Measures and Dual Neuro Assessment.

## 2021-04-25 ENCOUNTER — APPOINTMENT (OUTPATIENT)
Dept: NON INVASIVE DIAGNOSTICS | Age: 86
DRG: 065 | End: 2021-04-25
Attending: PSYCHIATRY & NEUROLOGY
Payer: MEDICARE

## 2021-04-25 ENCOUNTER — APPOINTMENT (OUTPATIENT)
Dept: VASCULAR SURGERY | Age: 86
DRG: 065 | End: 2021-04-25
Attending: PSYCHIATRY & NEUROLOGY
Payer: MEDICARE

## 2021-04-25 LAB
AV R PG: 52.8 MMHG
CHOLEST SERPL-MCNC: 298 MG/DL
ECHO AO ROOT DIAM: 2.92 CM
ECHO AR MAX VEL PISA: 363.33 CM/S
ECHO AV MEAN GRADIENT: 10.26 MMHG
ECHO AV PEAK GRADIENT: 17.51 MMHG
ECHO AV PEAK VELOCITY: 209.23 CM/S
ECHO AV REGURGITANT PHT: 438.83 MS
ECHO AV VTI: 45.77 CM
ECHO LA MAJOR AXIS: 2.53 CM
ECHO LA MINOR AXIS: 1.52 CM
ECHO LV E' LATERAL VELOCITY: 5.86 CM/S
ECHO LV E' SEPTAL VELOCITY: 4.41 CM/S
ECHO LV INTERNAL DIMENSION DIASTOLIC: 4.41 CM (ref 3.9–5.3)
ECHO LV INTERNAL DIMENSION SYSTOLIC: 2.38 CM
ECHO LV IVSD: 0.96 CM (ref 0.6–0.9)
ECHO LV MASS 2D: 153.5 G (ref 67–162)
ECHO LV MASS INDEX 2D: 92.4 G/M2 (ref 43–95)
ECHO LV POSTERIOR WALL DIASTOLIC: 1.09 CM (ref 0.6–0.9)
ECHO LVOT PEAK GRADIENT: 5.1 MMHG
ECHO LVOT PEAK VELOCITY: 112.93 CM/S
ECHO LVOT VTI: 26.53 CM
ECHO MV A VELOCITY: 97.7 CM/S
ECHO MV AREA PHT: 2.12 CM2
ECHO MV E DECELERATION TIME (DT): 357.69 MS
ECHO MV E VELOCITY: 59.2 CM/S
ECHO MV E/A RATIO: 0.61
ECHO MV E/E' LATERAL: 10.1
ECHO MV E/E' RATIO (AVERAGED): 11.76
ECHO MV E/E' SEPTAL: 13.42
ECHO MV PRESSURE HALF TIME (PHT): 103.73 MS
ECHO PV MAX VELOCITY: 121.17 CM/S
ECHO PV PEAK INSTANTANEOUS GRADIENT SYSTOLIC: 5.87 MMHG
ECHO RV TAPSE: 1.7 CM (ref 1.5–2)
ECHO TV REGURGITANT MAX VELOCITY: 216.82 CM/S
ECHO TV REGURGITANT PEAK GRADIENT: 18.8 MMHG
HDLC SERPL-MCNC: 38 MG/DL
HDLC SERPL: 7.8 {RATIO} (ref 0–5)
LDLC SERPL CALC-MCNC: 211.6 MG/DL (ref 0–100)
LIPID PROFILE,FLP: ABNORMAL
TRIGL SERPL-MCNC: 242 MG/DL (ref ?–150)
VLDLC SERPL CALC-MCNC: 48.4 MG/DL

## 2021-04-25 PROCEDURE — 93306 TTE W/DOPPLER COMPLETE: CPT | Performed by: INTERNAL MEDICINE

## 2021-04-25 PROCEDURE — 74011250637 HC RX REV CODE- 250/637: Performed by: PSYCHIATRY & NEUROLOGY

## 2021-04-25 PROCEDURE — 97166 OT EVAL MOD COMPLEX 45 MIN: CPT

## 2021-04-25 PROCEDURE — 93880 EXTRACRANIAL BILAT STUDY: CPT

## 2021-04-25 PROCEDURE — 96374 THER/PROPH/DIAG INJ IV PUSH: CPT

## 2021-04-25 PROCEDURE — 97535 SELF CARE MNGMENT TRAINING: CPT

## 2021-04-25 PROCEDURE — 74011250637 HC RX REV CODE- 250/637: Performed by: INTERNAL MEDICINE

## 2021-04-25 PROCEDURE — 65660000000 HC RM CCU STEPDOWN

## 2021-04-25 PROCEDURE — 80061 LIPID PANEL: CPT

## 2021-04-25 PROCEDURE — 36415 COLL VENOUS BLD VENIPUNCTURE: CPT

## 2021-04-25 RX ORDER — AMLODIPINE BESYLATE 5 MG/1
10 TABLET ORAL DAILY
Status: DISCONTINUED | OUTPATIENT
Start: 2021-04-26 | End: 2021-04-27 | Stop reason: HOSPADM

## 2021-04-25 RX ORDER — AMLODIPINE BESYLATE 5 MG/1
5 TABLET ORAL ONCE
Status: COMPLETED | OUTPATIENT
Start: 2021-04-25 | End: 2021-04-25

## 2021-04-25 RX ORDER — ATORVASTATIN CALCIUM 40 MG/1
80 TABLET, FILM COATED ORAL
Status: DISCONTINUED | OUTPATIENT
Start: 2021-04-25 | End: 2021-04-27 | Stop reason: HOSPADM

## 2021-04-25 RX ADMIN — CARVEDILOL 12.5 MG: 12.5 TABLET, FILM COATED ORAL at 17:26

## 2021-04-25 RX ADMIN — Medication 10 ML: at 06:22

## 2021-04-25 RX ADMIN — APIXABAN 2.5 MG: 2.5 TABLET, FILM COATED ORAL at 09:28

## 2021-04-25 RX ADMIN — CYANOCOBALAMIN TAB 500 MCG 1000 MCG: 500 TAB at 09:28

## 2021-04-25 RX ADMIN — ATORVASTATIN CALCIUM 80 MG: 40 TABLET, FILM COATED ORAL at 21:56

## 2021-04-25 RX ADMIN — Medication 10 ML: at 21:56

## 2021-04-25 RX ADMIN — CARVEDILOL 12.5 MG: 12.5 TABLET, FILM COATED ORAL at 09:28

## 2021-04-25 RX ADMIN — Medication 10 ML: at 17:27

## 2021-04-25 RX ADMIN — AMLODIPINE BESYLATE 5 MG: 5 TABLET ORAL at 09:28

## 2021-04-25 RX ADMIN — AMLODIPINE BESYLATE 5 MG: 5 TABLET ORAL at 11:00

## 2021-04-25 RX ADMIN — APIXABAN 2.5 MG: 2.5 TABLET, FILM COATED ORAL at 17:26

## 2021-04-25 RX ADMIN — ESCITALOPRAM OXALATE 10 MG: 10 TABLET ORAL at 09:28

## 2021-04-25 NOTE — PROGRESS NOTES
Bedside and Verbal shift change report given to 2021 Yelena Julio (oncoming nurse) by Georgi Dillon (offgoing nurse). Report included the following information SBAR, Kardex, MAR, Med Rec Status, Cardiac Rhythm NSR-SB, Quality Measures and Dual Neuro Assessment.

## 2021-04-25 NOTE — PROGRESS NOTES
Orders received, chart reviewed, /63, nurse, physician, CM and family in the room. 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 PLOF: modified independence with cane, RW and w/c. Increased time ADLs since fall 2/2020 in [place] and moved in with daughter here in [place]. Decreased endurance, cognition, speech and sleeping in recently. Will f/u later today for OT evaluation. Thank you.

## 2021-04-25 NOTE — PROGRESS NOTES
Problem: Falls - Risk of  Goal: *Absence of Falls  Description: Document Janny Meade Fall Risk and appropriate interventions in the flowsheet. Outcome: Progressing Towards Goal  Note: Fall Risk Interventions:  Mobility Interventions: Communicate number of staff needed for ambulation/transfer, Bed/chair exit alarm         Medication Interventions: Assess postural VS orthostatic hypotension, Evaluate medications/consider consulting pharmacy, Bed/chair exit alarm    Elimination Interventions: Bed/chair exit alarm, Call light in reach, Toileting schedule/hourly rounds, Patient to call for help with toileting needs              Problem: Pressure Injury - Risk of  Goal: *Prevention of pressure injury  Description: Document Gigi Scale and appropriate interventions in the flowsheet.   Outcome: Progressing Towards Goal  Note: Pressure Injury Interventions:       Moisture Interventions: Apply protective barrier, creams and emollients, Absorbent underpads, Assess need for specialty bed, Contain wound drainage, Check for incontinence Q2 hours and as needed    Activity Interventions: PT/OT evaluation, Increase time out of bed    Mobility Interventions: Assess need for specialty bed, PT/OT evaluation    Nutrition Interventions: Document food/fluid/supplement intake, Discuss nutritional consult with provider, Offer support with meals,snacks and hydration    Friction and Shear Interventions: Lift sheet, HOB 30 degrees or less

## 2021-04-25 NOTE — PROGRESS NOTES
TRANSITION OF CARE  RUR--14%  Disposition--SNF rehab. Provider TBD. CM gave daughter SNF provider list 4/25/21. PT evaluation 4/24/21 recommended SNF rehab. OT evaluation is pending. Patient will need local PCP. CM gave daughter the [facility] physician list.  Transport--BLS stretcher with [facility]. Patient's primary family contact: daughter/caregiver Usman Bradshaw [phone]. Care Management Interventions  PCP Verified by CM: Yes  Transition of Care Consult (CM Consult): SNF(Provider TBD)  Physical Therapy Consult: Yes  Occupational Therapy Consult: Yes  Speech Therapy Consult: Yes  Current Support Network: Lives with Caregiver  Confirm Follow Up Transport: (TBD)  The Plan for Transition of Care is Related to the Following Treatment Goals : SNF rehab. Provider TBD. Discharge Location  Discharge Placement: Skilled nursing facility    Reason for Admission:   Acute lacuna infarct (stroke)            A fib            HTN            History of dementia, HTN, A fib, stuttering, CKD                     RUR Score:        14%             Plan for utilizing home health:  None        PCP: First and Last name:  Gail Knight MD   PCP is Thai oRmero MD   Name of Practice: [facility]  [phone]   Are you a current patient: Yes    Approximate date of last visit: phone visit January 2021/ next visit planned via phone   Can you participate in a virtual visit with your PCP:                     Current Advanced Directive/Advance Care Plan: Full Code                     No AMD.    Healthcare Decision Maker:   Click here to complete [address] including [address] Relationship (ie \"Primary\")                             Transition of Care Plan:                    CM met with patient and her daughter/caregiver Cristina at the bedside. Patient was alert, social, but unable to understand the discussion. . Patient has lived with John Navarro since last year.  Also in the home are son in law, grand daughter, and grand daughter's children. Patient also has a daughter in [place]. Patient's daughter confirmed PCP, health insurance, and prescription coverage. Previous home health: None. Previous [facility]/ SNF rehab: [place]. DME: cane, rolling walker, wheelchair. CM noted PT recommendation for SNF rehab. CM spoke with the hospitalist who agreed with the recommendation. CM met with daughter who also agreed with the recommendation. CM gave daughter the SNF rehab provider list to review.

## 2021-04-25 NOTE — PROGRESS NOTES
Problem: Self Care Deficits Care Plan (Adult)  Goal: *Acute Goals and Plan of Care (Insert Text)  Description: FUNCTIONAL STATUS PRIOR TO ADMISSION: Patient was modified independence prior to fall in 2/2020 where she lived in [place]. She then moved to [place] to live with daughter in which she increasingly has declined from  1x/week and driving to in bed 63% of the time and increased garbled speech. Daughter baths and dresses patient, moderate cues to complete tasks and patient declines water on face and brushing teeth. Son-in-law states \"Marybel get OOB\" and she obliges, then walks to the kitchen but back to bed if not busy and cued. HOME SUPPORT: The patient lived with daughter and RANDAL. RW, cane, and w/c. Occupational Therapy Goals  Initiated 4/25/2021   1. Patient will perform opening 4/4 ADL containers with minimal assistance/contact guard assist within 7 day(s). 2.  Patient will perform sitting EOB upper body ADLs 5 mins with supervision/set-up within 7 day(s). 3.  Patient will perform BSC toilet transfers with maximal assistance within 7 day(s). 4.  Patient will perform grooming tasks 3/5 with min assistance within 7 day(s). 5.  Patient will participate in upper extremity therapeutic exercise/activities with moderate assistance  within 7 day(s). 6.  Patient will improve their Fugl Campbell score by 5 points in prep for ADLs within 7 days.        Outcome: Not Met  OCCUPATIONAL THERAPY EVALUATION  Patient: Pramod London (5 y.o. female)  Date: 4/25/2021  Primary Diagnosis: Pre-syncope [R55]  CVA (cerebral vascular accident) 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 Current Level of Function Impacting Discharge (ADLs/self-care): max assistance upper body ADLs; total assistance lower body ADLs, bed mobility max assistance overall    Functional Outcome Measure: The patient scored Total A-D  Total A-D (Motor Function): 30/66 on the Fugl-Campbell Assessment which is indicative of moderate / severe impairment in upper extremity functional status. Other factors to consider for discharge: daughter and RANDAL     Patient will benefit from skilled therapy intervention to address the above noted impairments. PLAN :  Recommendations and Planned Interventions: self care training, functional mobility training, therapeutic exercise, balance training, visual/perceptual training, therapeutic activities, cognitive retraining, endurance activities, neuromuscular re-education, patient education, home safety training, and family training/education    Frequency/Duration: Patient will be followed by occupational therapy 5 times a week to address goals. Recommendation for discharge: (in order for the patient to meet his/her long term goals)  Therapy up to 5 days/week in SNF setting    This discharge recommendation:  Has not yet been discussed the attending provider and/or case management    IF patient discharges home will need the following DME: transfer bench       SUBJECTIVE:   Patient stated It squeezes and squeezes (BP cuff).     OBJECTIVE DATA SUMMARY:   HISTORY:   Past Medical History:   Diagnosis Date    A-fib ([facility] [address]     Bilateral cataracts     Bronchitis     CKD (chronic kidney disease)     GERD (gastroesophageal reflux disease)     HTN (hypertension)     Hyperlipidemia     Insomnia     Stuttering      Past Surgical History:   Procedure Laterality Date    HX CAROTID ENDARTERECTOMY      HX HYSTERECTOMY      HX TONSILLECTOMY       Expanded or extensive additional review of patient history:     Home Situation  Home Environment: Private residence  # Steps to Enter: 3  Rails to Enter: No  One/Two Story Residence: Two story, live on 1st floor  Living Alone: No  Support Systems: Child(liliana)  Current DME Used/Available at Home: Cane, straight, Wheelchair, Walker, rolling    Hand dominance: Right    EXAMINATION OF PERFORMANCE DEFICITS:  Cognitive/Behavioral Status:  Neurologic State: Alert;Eyes open spontaneously  Orientation Level: Oriented to person;Oriented to place; Disoriented to situation;Disoriented to time  Cognition: Follows commands;Memory loss; Impaired decision making  Perception: Appears intact  Perseveration: No perseveration noted  Safety/Judgement: Awareness of environment;Decreased insight into deficits; Decreased awareness of need for safety    Skin: intact    Edema: intact    Hearing:       Vision/Perceptual:                           Acuity: Impaired near vision; Impaired far vision    Corrective Lenses: Glasses    Range of Motion:  R elbow-digits WDL  AROM: Generally decreased, functional  PROM: Generally decreased, functional   Shoulder flexion 90*                   Strength:  -3/5 R UE  Strength: Generally decreased, functional                Coordination:  Coordination: Generally decreased, functional  Fine Motor Skills-Upper: Left Impaired;Right Impaired    Gross Motor Skills-Upper: Left Intact; Right Intact    Tone & Sensation:                              Balance:  Sitting: Impaired; Without support  Sitting - Static: Good (unsupported)  Sitting - Dynamic: Fair (occasional)(R lateral and posterior lean)    Functional Mobility and Transfers for ADLs:  Bed Mobility exit L side as patient prefers  Supine to Sit: Moderate assistance  Sit to Supine: Moderate assistance  Scooting: Maximum assistance(weight shift, physical A)    Transfers:       ADL Assessment:  Feeding:  Moderate assistance daughter setup all containers, patient self feeding with R UE    Oral Facial Hygiene/Grooming: Maximum assistance setup cloth in hand, tactile cue; patient did vocalize need for towel    Bathing: Total assistance    Upper Body Dressing: Total assistance    Lower Body Dressing: Total assistance functional reach to knees sitting EOB with max A to follow commands and sitting balance    Toileting: Total assistance noted urinary incontinence, brief, nurse informed                ADL Intervention and task modifications:                                     Cognitive Retraining  Safety/Judgement: Awareness of environment;Decreased insight into deficits; Decreased awareness of need for safety    Therapeutic Exercise:     Functional Measure:  Fugl-Campbell Assessment of Motor Recovery after Stroke:   Reflex Activity  Flexors/Biceps/Fingers: Can be elicited  Extensors/Triceps: Can be elicited  Reflex Subtotal: 4    Volitional Movement Within Synergies  Shoulder Retraction: Partial  Shoulder Elevation: Partial  Shoulder Abduction (90 degrees): Partial  Shoulder External Rotation: Partial  Elbow Flexion: Partial  Forearm Supination: Partial  Shoulder Adduction/Internal Rotation: Partial  Elbow Extension: Partial  Forearm Pronation: Partial  Subtotal: 9    Volitional Movement Mixing Synergies  Hand to Lumbar Spine: Partial  Shoulder Flexion (0-90 degrees): Partial  Pronation-Supination: Partial  Subtotal: 3    Volitional Movement With Little or No Synergy  Shoulder Abduction (0-90 degrees): Partial  Shoulder Flexion ( degrees): None  Pronation/Supination: Partial  Subtotal : 2    Normal Reflex Activity  Biceps, Triceps, Finger Flexors:  Full  Subtotal : 2    Upper Extremity Total   Upper Extremity Total: 20    Wrist  Stability at 15 Degree Dorsiflexion: Partial  Repeated Dorsiflexion/ Volar Flexion: Partial  Stability at 15 Degree Dorsiflexion: Partial  Repeated Dorsiflexion/ Volar Flexion: Partial  Circumduction: Partial  Wrist Total: 5    Hand  Mass Flexion: Partial  Mass Extension: Partial  Grasp A: Partial  Grasp B: None  Grasp C: None  Grasp D: Partial  Grasp E: Partial  Hand Total: 5    Coordination/Speed  Tremor: Marked  Dysmetria: Marked  Time: >5s  Coordination/Speed Total : 0    Total A-D  Total A-D (Motor Function): 30/66     This is a reliable/valid measure of arm function after a neurological event. It has established value to characterize functional status and for measuring spontaneous and therapy-induced recovery; tests proximal and distal motor functions. Fugl-Campbell Assessment - UE scores recorded between five and 30 days post neurologic event can be used to predict UE recovery at six months post neurologic event. Severe = 0-21 points   Moderately Severe = 22-33 points   Moderate = 34-47 points   Mild = 48-66 points  BRIANNA Lopez, ISAAC Aponte, & DEMETRICE Mora (1992). Measurement of motor recovery after stroke: Outcome assessment and sample size requirements.  Stroke, 23, pp. 9490-4227.   ------------------------------------------------------------------------------------------------------------------------------------------------------------------  MCID:  Stroke:   Klever Banks et al, 2001; n = 171; mean age 79 (5) years; assessed within 16 (12) days of stroke, Acute Stroke)  FMA Motor Scores from Admission to Discharge   10 point increase in FMA Upper Extremity = 1.5 change in discharge FIM   10 point increase in FMA Lower Extremity = 1.9 change in discharge FIM  MDC:  Stroke:   Rm Chappell et al, 2008, n = 14, mean age = 59.9 (14.6) years, assessed on average 14 (6.5) months post stroke, Chronic Stroke)   FMA = 5.2 points for the Upper Extremity portion of the assessment     Pain Rating:      Activity Tolerance:   Vitals:    04/24/21 2200 04/25/21 0600 04/25/21 0928 04/25/21 1015   BP: (!) 174/55  Comment: hydralazine given 132/70 (!) 187/56 (!) 189/63   BP 1 Location: Right upper arm Right upper arm  Right arm   BP Patient Position: At rest At rest  At rest   Pulse: 72 69 64 64 Resp: 15 19  18   Temp: 98.7 °F (37.1 °C) 98.4 °F (36.9 °C)  98.3 °F (36.8 °C)   SpO2: 94% 96%  97%         After treatment patient left in no apparent distress:    Supine in bed, Call bell within reach, Bed / chair alarm activated, Caregiver / family present, and Side rails x 3    COMMUNICATION/EDUCATION:   The patients plan of care was discussed with: Registered nurse. 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 She demonstrated Fair understanding as evidenced by repeat back. Family was verbally educated on the BE FAST acronym for signs/symptoms of CVA and TIA. BE FAST was written on patient's communication board  for visual education and reinforcement. All questions answered with patient indicating good understanding. Home safety education was provided and the patient/caregiver indicated understanding., Patient/family have participated as able in goal setting and plan of care. , and Patient/family agree to work toward stated goals and plan of care. This patients plan of care is appropriate for delegation to [facility].     Thank you for this referral.  Colten White  Time Calculation: 27 mins

## 2021-04-25 NOTE — PROGRESS NOTES
[address] [facility] Adult  Hospitalist Group                                                                                          Hospitalist Progress Note  Abi Ryan DO  Answering service: [phone] OR [phone] from in house phone        Date of Service:  2021  NAME:  Hannah Singh  :  1932  MRN:  [id number]      Admission Summary:   80 y.o. female with h/o dementia, HTN, afib on Eliquis, stuttering, and CKD who lives with her daughter who over the last few days has had some transient mumbling/garbling of speech. She has had transient episodes just like this over the last year or so that always resolve. At some point she was told she had a TIA and was put on Eliquis for afib. Sometimes they last up to a day. It occurred a few times during my exam and consisted of intermittent garbled speech with difficulty understanding what she was trying to say. Daughter also notes progressive generalized weakness and fatigue and her not wanting to get out of bed. No falls. She has had no headache, shortness of breath or chest pain. There has been no abdominal pain.  No new medications.  Today the daughter got her out of bed to take her to get a Covid vaccine. She ambulated with assistance to her wheelchair and then they rolled her in the wheelchair toward the car when she started to slump over. She does not believe she lost consciousness, but just was kind of out of it. Pt did not verbalize any presyncopal symptoms like lightheadedness or dizziness. No seizure like activity. Daughter denies decreased PO intake although notes she sleeps through breakfast. She was brought to the hospital by EMS. She appears comfortable. Pt unable to provide history d/t dementia.     Interval history / Subjective: Follow up speech difficulties. Patient seen and examined. Daughter at bedside. Daughter states that patient's dysarthria persistent, no change from admission, overall worse from bedside.       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  -unable to do CTA head/neck due to elevated Cr. 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 Most recent are:  Visit Vitals  BP (!) 189/63 (BP 1 Location: Right arm, BP Patient Position: At rest)   Pulse 64   Temp 98.3 °F (36.8 °C)   Resp 18   SpO2 97%       No intake or output data in the 24 hours ending 04/25/21 1201     Physical Examination:     I had a face to face encounter with this patient and independently examined them on 4/25/2021 as outlined below:          Constitutional:  No acute distress, cooperative, pleasant, elderly female    ENT:  Oral mucosa moist, oropharynx benign. Resp:  CTA bilaterally. No wheezing/rhonchi/rales. No accessory muscle use   CV:  Regular rhythm, normal rate, no murmurs, gallops, rubs    GI:  Soft, non distended, non tender.  normoactive bowel sounds, no hepatosplenomegaly     Musculoskeletal: No edema, warm, 2+ pulses throughout    Neurologic:  Moves all extremities. AAOx2, intermittet dysarthria           Data Review:    Review and/or order of clinical lab test  Review and/or order of tests in the radiology section of [facility]  Review and/or order of tests in the medicine section of [facility]      Labs:     Recent Labs     04/24/21  0127 04/23/21  1354   WBC 9.4 8.3   HGB 12.1 13.3   HCT 37.1 41.7    274     Recent Labs     04/24/21  0127 04/23/21  1800 04/23/21  1354   *  --  137   K 3.9  --  4.1     --  107   CO2 24  --  24   BUN 30*  --  30*   CREA 1.78*  --  1.83*   *  --  148*   CA 8.8  --  8.6   MG  --  2.0  --    PHOS  --  3.5  --      Recent Labs     04/23/21  1354   ALT 10*   *   TBILI 0.3   TP 6.6   ALB 2.4*   GLOB 4.2*     No results for input(s): INR, PTP, APTT, INREXT, INREXT in the last 72 hours. No results for input(s): FE, TIBC, PSAT, FERR in the last 72 hours. Lab Results   Component Value Date/Time    Folate 14.2 04/23/2021 06:00 PM      No results for input(s): PH, PCO2, PO2 in the last 72 hours.   Recent Labs     04/23/21  1354   TROIQ <0.05     Lab Results   Component Value Date/Time    Cholesterol, total 298 (H) 04/25/2021 03:01 AM    HDL Cholesterol 38 04/25/2021 03:01 AM    LDL, calculated 211.6 (H) 04/25/2021 03:01 AM    Triglyceride 242 (H) 04/25/2021 03:01 AM    CHOL/HDL Ratio 7.8 (H) 04/25/2021 03:01 AM     No results found for: [facility]  Lab Results   Component Value Date/Time    Color YELLOW/STRAW 04/23/2021 03:44 PM    Appearance CLEAR 04/23/2021 03:44 PM    Specific gravity 1.020 04/23/2021 03:44 PM    pH (UA) 7.0 04/23/2021 03:44 PM    Protein >300 (A) 04/23/2021 03:44 PM    Glucose 250 (A) 04/23/2021 03:44 PM    Ketone Negative 04/23/2021 03:44 PM    Bilirubin Negative 04/23/2021 03:44 PM    Urobilinogen 0.2 04/23/2021 03:44 PM    Nitrites Negative 04/23/2021 03:44 PM    Leukocyte Esterase Negative 04/23/2021 03:44 PM    Epithelial cells FEW 04/23/2021 03:44 PM    Bacteria 1+ (A) 04/23/2021 03:44 PM    WBC 5-10 04/23/2021 03:44 PM    RBC 0-5 04/23/2021 03:44 PM         Medications Reviewed:     Current Facility-Administered Medications   Medication Dose Route Frequency    atorvastatin (LIPITOR) tablet 80 mg  80 mg Oral QHS    [START ON 4/26/2021] amLODIPine (NORVASC) tablet 10 mg  10 mg Oral DAILY    zinc oxide-cod liver oil (DESITIN) 40 % paste   Topical PRN    sodium chloride (NS) flush 5-40 mL  5-40 mL IntraVENous Q8H    sodium chloride (NS) flush 5-40 mL  5-40 mL IntraVENous PRN    acetaminophen (TYLENOL) tablet 650 mg  650 mg Oral Q6H PRN    Or    acetaminophen (TYLENOL) suppository 650 mg  650 mg Rectal Q6H PRN    polyethylene glycol (MIRALAX) packet 17 g  17 g Oral DAILY PRN    ondansetron (ZOFRAN ODT) tablet 4 mg  4 mg Oral Q6H PRN    Or    ondansetron (ZOFRAN) injection 4 mg  4 mg IntraVENous Q6H PRN    hydrALAZINE (APRESOLINE) 20 mg/mL injection 20 mg  20 mg IntraVENous Q2H PRN    carvediloL (COREG) tablet 12.5 mg  12.5 mg Oral BID WITH MEALS    apixaban (ELIQUIS) tablet 2.5 mg  2.5 mg Oral BID    escitalopram oxalate (LEXAPRO) tablet 10 mg  10 mg Oral DAILY    ergocalciferol capsule 50,000 Units  50,000 Units Oral Q7D    cyanocobalamin (VITAMIN B12) tablet 1,000 mcg  1,000 mcg Oral DAILY     ______________________________________________________________________  EXPECTED LENGTH OF STAY: - - -  ACTUAL LENGTH OF STAY:          [address] [facility]

## 2021-04-25 NOTE — PROGRESS NOTES
Problem: Falls - Risk of  Goal: *Absence of Falls  Description: Document [place] Fall Risk and appropriate interventions in the flowsheet. Outcome: Progressing Towards Goal  Note: Fall Risk Interventions:  Mobility Interventions: Bed/chair exit alarm, Communicate number of staff needed for ambulation/transfer, OT consult for ADLs, Patient to call before getting OOB, PT Consult for mobility concerns, PT Consult for assist device competence, Strengthening exercises (ROM-active/passive)         Medication Interventions: Bed/chair exit alarm, Evaluate medications/consider consulting pharmacy, Patient to call before getting OOB, Teach patient to arise slowly    Elimination Interventions: Bed/chair exit alarm, Call light in reach, Patient to call for help with toileting needs, Stay With Me (per policy), Toileting schedule/hourly rounds              Problem: Patient Education: Go to Patient Education Activity  Goal: Patient/Family Education  Outcome: Progressing Towards Goal     Problem: Pressure Injury - Risk of  Goal: *Prevention of pressure injury  Description: Document Gigi Scale and appropriate interventions in the flowsheet. Outcome: Progressing Towards Goal  Note: Pressure Injury Interventions:       Moisture Interventions: Absorbent underpads, Apply protective barrier, creams and emollients, Assess need for specialty bed, Check for incontinence Q2 hours and as needed, Limit adult briefs, Maintain skin hydration (lotion/cream), Minimize layers, Moisture barrier, Offer toileting Q_hr    Activity Interventions: Assess need for specialty bed, Increase time out of bed, Pressure redistribution bed/mattress(bed type), PT/OT evaluation    Mobility Interventions: Assess need for specialty bed, Float heels, HOB 30 degrees or less, Pressure redistribution bed/mattress (bed type), PT/OT evaluation, Turn and reposition approx.  every two hours(pillow and wedges)    Nutrition Interventions: Document food/fluid/supplement intake    Friction and Shear Interventions: Apply protective barrier, creams and emollients, Feet elevated on foot rest, HOB 30 degrees or less, Minimize layers                Problem: Patient Education: Go to Patient Education Activity  Goal: Patient/Family Education  Outcome: Progressing Towards Goal     Problem: Pain - Acute  Goal: *Control of acute pain  Outcome: Progressing Towards Goal     Problem: Patient Education: Go to Patient Education Activity  Goal: Patient/Family Education  Outcome: Progressing Towards Goal

## 2021-04-26 LAB
LEFT CCA DIST DIAS: 0 CM/S
LEFT CCA DIST SYS: 72.2 CM/S
LEFT CCA PROX DIAS: 6.4 CM/S
LEFT CCA PROX SYS: 100.7 CM/S
LEFT ECA DIAS: 17.3 CM/S
LEFT ECA SYS: 138.1 CM/S
LEFT ICA DIST DIAS: 9.7 CM/S
LEFT ICA DIST SYS: 42.9 CM/S
LEFT ICA MID DIAS: 8.6 CM/S
LEFT ICA MID SYS: 100.7 CM/S
LEFT ICA PROX DIAS: 21.7 CM/S
LEFT ICA PROX SYS: 116.1 CM/S
LEFT ICA/CCA SYS: 1.6
LEFT VERTEBRAL DIAS: 13.4 CM/S
LEFT VERTEBRAL SYS: 53.9 CM/S
RIGHT CCA DIST DIAS: 9 CM/S
RIGHT CCA DIST SYS: 74.2 CM/S
RIGHT CCA PROX DIAS: 11.6 CM/S
RIGHT CCA PROX SYS: 88.6 CM/S
RIGHT ECA DIAS: 17.9 CM/S
RIGHT ECA SYS: 191.5 CM/S
RIGHT ICA DIST DIAS: 7.6 CM/S
RIGHT ICA DIST SYS: 54.8 CM/S
RIGHT ICA MID DIAS: 15.8 CM/S
RIGHT ICA MID SYS: 79.7 CM/S
RIGHT ICA PROX DIAS: 7.2 CM/S
RIGHT ICA PROX SYS: 68.7 CM/S
RIGHT ICA/CCA SYS: 1.1
RIGHT VERTEBRAL DIAS: 8.6 CM/S
RIGHT VERTEBRAL SYS: 67.8 CM/S

## 2021-04-26 PROCEDURE — 74011250637 HC RX REV CODE- 250/637: Performed by: INTERNAL MEDICINE

## 2021-04-26 PROCEDURE — 99231 SBSQ HOSP IP/OBS SF/LOW 25: CPT | Performed by: PSYCHIATRY & NEUROLOGY

## 2021-04-26 PROCEDURE — 74011250637 HC RX REV CODE- 250/637: Performed by: PSYCHIATRY & NEUROLOGY

## 2021-04-26 PROCEDURE — 65660000000 HC RM CCU STEPDOWN

## 2021-04-26 RX ADMIN — APIXABAN 2.5 MG: 2.5 TABLET, FILM COATED ORAL at 17:39

## 2021-04-26 RX ADMIN — ESCITALOPRAM OXALATE 10 MG: 10 TABLET ORAL at 09:20

## 2021-04-26 RX ADMIN — CARVEDILOL 12.5 MG: 12.5 TABLET, FILM COATED ORAL at 09:20

## 2021-04-26 RX ADMIN — Medication 10 ML: at 21:05

## 2021-04-26 RX ADMIN — APIXABAN 2.5 MG: 2.5 TABLET, FILM COATED ORAL at 09:19

## 2021-04-26 RX ADMIN — CARVEDILOL 12.5 MG: 12.5 TABLET, FILM COATED ORAL at 17:39

## 2021-04-26 RX ADMIN — CYANOCOBALAMIN TAB 500 MCG 1000 MCG: 500 TAB at 09:19

## 2021-04-26 RX ADMIN — AMLODIPINE BESYLATE 10 MG: 5 TABLET ORAL at 09:20

## 2021-04-26 RX ADMIN — Medication 10 ML: at 05:37

## 2021-04-26 RX ADMIN — ATORVASTATIN CALCIUM 80 MG: 40 TABLET, FILM COATED ORAL at 21:05

## 2021-04-26 RX ADMIN — Medication 10 ML: at 17:39

## 2021-04-26 NOTE — PROGRESS NOTES
Neurology Progress Note    Patient ID:  Jayshree Fenton  [id number]  45 y.o.  2/26/1932    Subjective: In the interval from prior evaluation no significant changes have been noted. Patient's daughter mentions that her language is about the same and he does not notice any improvement or worsening. Does question how diagnosis of dementia is arrived at in testing for her as well as medications. Current Facility-Administered Medications   Medication Dose Route Frequency    atorvastatin (LIPITOR) tablet 80 mg  80 mg Oral QHS    amLODIPine (NORVASC) tablet 10 mg  10 mg Oral DAILY    zinc oxide-cod liver oil (DESITIN) 40 % paste   Topical PRN    sodium chloride (NS) flush 5-40 mL  5-40 mL IntraVENous Q8H    sodium chloride (NS) flush 5-40 mL  5-40 mL IntraVENous PRN    acetaminophen (TYLENOL) tablet 650 mg  650 mg Oral Q6H PRN    Or    acetaminophen (TYLENOL) suppository 650 mg  650 mg Rectal Q6H PRN    polyethylene glycol (MIRALAX) packet 17 g  17 g Oral DAILY PRN    ondansetron (ZOFRAN ODT) tablet 4 mg  4 mg Oral Q6H PRN    Or    ondansetron (ZOFRAN) injection 4 mg  4 mg IntraVENous Q6H PRN    hydrALAZINE (APRESOLINE) 20 mg/mL injection 20 mg  20 mg IntraVENous Q2H PRN    carvediloL (COREG) tablet 12.5 mg  12.5 mg Oral BID WITH MEALS    apixaban (ELIQUIS) tablet 2.5 mg  2.5 mg Oral BID    escitalopram oxalate (LEXAPRO) tablet 10 mg  10 mg Oral DAILY    ergocalciferol capsule 50,000 Units  50,000 Units Oral Q7D    cyanocobalamin (VITAMIN B12) tablet 1,000 mcg  1,000 mcg Oral DAILY          Objective:     Patient Vitals for the past 8 hrs:   BP Temp Pulse Resp SpO2   04/26/21 1015 (!) 122/47 99 °F (37.2 °C) (!) 56 15 96 %   04/26/21 0919 (!) 153/66 -- 74 -- --   04/26/21 0600 130/82 98.3 °F (36.8 °C) 64 16 95 %       No intake/output data recorded. No intake/output data recorded.     Lab Review   Recent Results (from the past 24 hour(s))   DUPLEX CAROTID BILATERAL    Collection Time: 04/25/21 3:10 PM   Result Value Ref Range    Right CCA prox sys 88.6 cm/s    Right CCA prox turk 11.6 cm/s    Right cca dist sys 74.2 cm/s    Right CCA dist turk 9.0 cm/s    Right eca sys 191.5 cm/s    RIGHT EXTERNAL CAROTID ARTERY D 17.90 cm/s    Right ICA prox sys 68.7 cm/s    Right ICA prox turk 7.2 cm/s    Right ICA mid sys 79.7 cm/s    Right ICA mid turk 15.8 cm/s    Right ICA dist sys 54.8 cm/s    Right ICA dist turk 7.6 cm/s    Right vertebral sys 67.8 cm/s    RIGHT VERTEBRAL ARTERY D 8.60 cm/s    Right ICA/CCA sys 1.1     Left CCA prox sys 100.7 cm/s    Left CCA prox turk 6.4 cm/s    Left CCA dist sys 72.2 cm/s    Left CCA dist turk 0.0 cm/s    Left ECA sys 138.1 cm/s    LEFT EXTERNAL CAROTID ARTERY D 17.30 cm/s    Left ICA prox sys 116.1 cm/s    Left ICA prox turk 21.7 cm/s    Left ICA mid sys 100.7 cm/s    Left ICA mid turk 8.6 cm/s    Left ICA dist sys 42.9 cm/s    Left ICA dist turk 9.7 cm/s    Left vertebral sys 53.9 cm/s    LEFT VERTEBRAL ARTERY D 13.40 cm/s    Left ICA/CCA sys 1.60    ECHO ADULT COMPLETE    Collection Time: 04/25/21  3:30 PM   Result Value Ref Range    IVSd 0.96 (A) 0.60 - 0.90 cm    LVIDd 4.41 3.90 - 5.30 cm    LVIDs 2.38 cm    LVPWd 1.09 (A) 0.60 - 0.90 cm    LVOT Peak Gradient 5.10 mmHg    LVOT Peak Velocity 112.93 cm/s    LVOT VTI 26.53 cm    Left Atrium Major Axis 2.53 cm    AV R PG 52.80 mmHg    Aortic Regurgitant Pressure Half-time 438.83 ms    AR Max Adam 363.33 cm/s    AoV PG 17.51 mmHg    Aortic Valve Systolic Mean Gradient 50.06 mmHg    Aortic Valve Systolic Peak Velocity 938.49 cm/s    AoV VTI 45.77 cm    MV A Adam 97.70 cm/s    Mitral Valve E Wave Deceleration Time 357.69 ms    MV E Adam 59.20 cm/s    E/E' ratio (averaged) 11.76     E/E' lateral 10.10     E/E' septal 13.42     LV E' Lateral Velocity 5.86 cm/s    LV E' Septal Velocity 4.41 cm/s    Mitral Valve Pressure Half-time 103.73 ms    MVA (PHT) 2.12 cm2    Pulmonic Valve Systolic Peak Instantaneous Gradient 5.87 mmHg Pulmonic Valve Max Velocity 121.17 cm/s    Tapse 1.70 1.50 - 2.00 cm    Triscuspid Valve Regurgitation Peak Gradient 18.80 mmHg    TR Max Velocity 216.82 cm/s    Ao Root D 2.92 cm    MV E/A 0.61     LV Mass .5 67.0 - 162.0 g    LV Mass AL Index 92.4 43.0 - 95.0 g/m2    Left Atrium Minor Axis 1.52 cm     Physical examination:  Elderly female laying in bed resting comfortably and not disturbed.     Assessment:     Yolanda Solorio is a 80-year-old right-hand-dominant female admitted to the hospital for presyncopal/syncopal episode in the setting of premorbid language impairment since worsened with a possibly incidental versus subtle asymptomatic right paramedian pontine ischemic infarct    Plan:   Right paramedian ischemic infarct:  Appears small vessel in nature in the setting of significant dyslipidemia  Patient is now on high intensity statin, with target blood pressure of less than 140/90 at minimum as an outpatient/going forward  Patient is on Eliquis 2.5 mg adjusted for age and renal function, would not add aspirin given that patient occasionally misses doses of Eliquis and the benefit of adding aspirin as does not clearly outweigh the increased risk  TTE, carotid Dopplers were largely unremarkable  Appears reasonable for discharge to skilled nursing facility when able    Cognitive impairment:  Suspect underlying neurodegenerative disorder (dementia) with language predominance  At the daughter's request we will start patient on memantine given that the patient's low resting heart rate would preclude initiation of an anticholinergic at this time  Patient's B12 as well as methylmalonic acid and homocystine are elevated has been started on B12 supplementation  Discussed measures to diagnose/assess dementia can be seen as an outpatient after discharge    Please call with questions concerns    Signed:  Sherley Lesch, MD  4/26/2021  11:58 AM

## 2021-04-26 NOTE — PROGRESS NOTES
Transition of Care Plan   RUR- 14% Low Risk   DISPOSITION: The disposition plan is to transition to SNF - pending review.  Transport: DOMITILA WRIGHT met with patient and patients daughter this morning at bedside to check in. Patients daughter Prince Menendez was present and reports interested in [facility] and [facility]. CM will submit for review. CM will continue to follow, provide support and assist with LACHO needs as they arise.     Delma Olivas, CRM

## 2021-04-26 NOTE — PROGRESS NOTES
Problem: Falls - Risk of  Goal: *Absence of Falls  Description: Document Alfa Rolle Fall Risk and appropriate interventions in the flowsheet. Outcome: Progressing Towards Goal  Note: Fall Risk Interventions:  Mobility Interventions: Bed/chair exit alarm         Medication Interventions: Bed/chair exit alarm    Elimination Interventions: Bed/chair exit alarm, Call light in reach, Elevated toilet seat              Problem: Pressure Injury - Risk of  Goal: *Prevention of pressure injury  Description: Document Gigi Scale and appropriate interventions in the flowsheet.   Outcome: Progressing Towards Goal  Note: Pressure Injury Interventions:       Moisture Interventions: Internal/External urinary devices, Apply protective barrier, creams and emollients, Absorbent underpads    Activity Interventions: PT/OT evaluation    Mobility Interventions: HOB 30 degrees or less    Nutrition Interventions: Document food/fluid/supplement intake    Friction and Shear Interventions: HOB 30 degrees or less

## 2021-04-26 NOTE — PROGRESS NOTES
[facility] Adult  Hospitalist Group                                                                                          Hospitalist Progress Note  Abi Martinez,   Answering service: [phone] OR [phone] from in house phone        Date of Service:  2021  NAME:  Leopoldo Palomino  :  1932  MRN:  [id number]      Admission Summary:   80 y.o. female with h/o dementia, HTN, afib on Eliquis, stuttering, and CKD who lives with her daughter who over the last few days has had some transient mumbling/garbling of speech. She has had transient episodes just like this over the last year or so that always resolve. At some point she was told she had a TIA and was put on Eliquis for afib. Sometimes they last up to a day. It occurred a few times during my exam and consisted of intermittent garbled speech with difficulty understanding what she was trying to say. Daughter also notes progressive generalized weakness and fatigue and her not wanting to get out of bed. No falls. She has had no headache, shortness of breath or chest pain. There has been no abdominal pain.  No new medications.  Today the daughter got her out of bed to take her to get a Covid vaccine. She ambulated with assistance to her wheelchair and then they rolled her in the wheelchair toward the car when she started to slump over. She does not believe she lost consciousness, but just was kind of out of it. Pt did not verbalize any presyncopal symptoms like lightheadedness or dizziness. No seizure like activity. Daughter denies decreased PO intake although notes she sleeps through breakfast. She was brought to the hospital by EMS. She appears comfortable. Pt unable to provide history d/t dementia.     Interval history / Subjective: Follow up speech difficulties. Patient seen and examined. Daughter at bedside. No acute events. Still with speech difficulties.        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  -unable to do CTA head/neck due to elevated Cr. 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   -discontinued IVFs      Code status: full   DVT prophylaxis: eliquis    Care Plan discussed with: Patient/Family  Anticipated Disposition: SNF  Anticipated Discharge: 24 hours to 48 hours     Hospital Problems  Date Reviewed: 2/25/2021          Codes Class Noted POA    CVA (cerebral vascular accident) [facility]) ICD-10-CM: I63.9  ICD-9-CM: 434.91  4/24/2021 Unknown        Pre-syncope ICD-10-CM: R55  ICD-9-CM: 780.2  4/23/2021 Unknown                Review of Systems:   Negative unless stated above      Vital Signs:    Last 24hrs VS reviewed since prior progress note. Most recent are:  Visit Vitals  BP (!) 131/51 (BP 1 Location: Right arm, BP Patient Position: At rest)   Pulse (!) 56   Temp 98.2 °F (36.8 °C)   Resp 14   Ht 5' (1.524 m)   Wt 75.8 kg (167 lb 3.2 oz)   SpO2 98%   BMI 32.65 kg/m²       No intake or output data in the 24 hours ending 04/26/21 1720     Physical Examination:     I had a face to face encounter with this patient and independently examined them on 4/26/2021 as outlined below:          Constitutional:  No acute distress, cooperative, pleasant, elderly female    ENT:  Oral mucosa moist, oropharynx benign. Resp:  CTA bilaterally. No wheezing/rhonchi/rales. No accessory muscle use   CV:  Regular rhythm, normal rate, no murmurs, gallops, rubs    GI:  Soft, non distended, non tender.  normoactive bowel sounds, no hepatosplenomegaly     Musculoskeletal:  No edema, warm, 2+ pulses throughout    Neurologic:  Moves all extremities. AAOx2, intermittet dysarthria           Data Review:    Review and/or order of clinical lab test  Review and/or order of tests in the radiology section of [facility]  Review and/or order of tests in the medicine section of [facility]      Labs:     Recent Labs     04/24/21 0127   WBC 9.4   HGB 12.1   HCT 37.1        Recent Labs     04/24/21 0127 04/23/21  1800   *  --    K 3.9  --      --    CO2 24  --    BUN 30*  --    CREA 1.78*  --    *  --    CA 8.8  --    MG  --  2.0   PHOS  --  3.5     No results for input(s): ALT, AP, TBIL, TBILI, TP, ALB, GLOB, GGT, AML, LPSE in the last 72 hours. No lab exists for component: SGOT, GPT, AMYP, HLPSE  No results for input(s): INR, PTP, APTT, INREXT, INREXT in the last 72 hours. No results for input(s): FE, TIBC, PSAT, FERR in the last 72 hours. Lab Results   Component Value Date/Time    Folate 14.2 04/23/2021 06:00 PM      No results for input(s): PH, PCO2, PO2 in the last 72 hours. No results for input(s): CPK, CKNDX, TROIQ in the last 72 hours.     No lab exists for component: CPKMB  Lab Results   Component Value Date/Time    Cholesterol, total 298 (H) 04/25/2021 03:01 AM    HDL Cholesterol 38 04/25/2021 03:01 AM    LDL, calculated 211.6 (H) 04/25/2021 03:01 AM    Triglyceride 242 (H) 04/25/2021 03:01 AM    CHOL/HDL Ratio 7.8 (H) 04/25/2021 03:01 AM     No results found for: [facility]  Lab Results   Component Value Date/Time    Color YELLOW/STRAW 04/23/2021 03:44 PM    Appearance CLEAR 04/23/2021 03:44 PM    Specific gravity 1.020 04/23/2021 03:44 PM    pH (UA) 7.0 04/23/2021 03:44 PM    Protein >300 (A) 04/23/2021 03:44 PM    Glucose 250 (A) 04/23/2021 03:44 PM    Ketone Negative 04/23/2021 03:44 PM    Bilirubin Negative 04/23/2021 03:44 PM    Urobilinogen 0.2 04/23/2021 03:44 PM    Nitrites Negative 04/23/2021 03:44 PM    Leukocyte Esterase Negative 04/23/2021 03:44 PM    Epithelial cells FEW 04/23/2021 03:44 PM    Bacteria 1+ (A) 04/23/2021 03:44 PM    WBC 5-10 04/23/2021 03:44 PM    RBC 0-5 04/23/2021 03:44 PM         Medications Reviewed:     Current Facility-Administered Medications   Medication Dose Route Frequency    psyllium husk-aspartame (METAMUCIL FIBER) packet 1 Packet  1 Packet Oral TID PRN    atorvastatin (LIPITOR) tablet 80 mg  80 mg Oral QHS    amLODIPine (NORVASC) tablet 10 mg  10 mg Oral DAILY    zinc oxide-cod liver oil (DESITIN) 40 % paste   Topical PRN    sodium chloride (NS) flush 5-40 mL  5-40 mL IntraVENous Q8H    sodium chloride (NS) flush 5-40 mL  5-40 mL IntraVENous PRN    acetaminophen (TYLENOL) tablet 650 mg  650 mg Oral Q6H PRN    Or    acetaminophen (TYLENOL) suppository 650 mg  650 mg Rectal Q6H PRN    polyethylene glycol (MIRALAX) packet 17 g  17 g Oral DAILY PRN    ondansetron (ZOFRAN ODT) tablet 4 mg  4 mg Oral Q6H PRN    Or    ondansetron (ZOFRAN) injection 4 mg  4 mg IntraVENous Q6H PRN    hydrALAZINE (APRESOLINE) 20 mg/mL injection 20 mg  20 mg IntraVENous Q2H PRN    carvediloL (COREG) tablet 12.5 mg  12.5 mg Oral BID WITH MEALS    apixaban (ELIQUIS) tablet 2.5 mg  2.5 mg Oral BID    escitalopram oxalate (LEXAPRO) tablet 10 mg  10 mg Oral DAILY    ergocalciferol capsule 50,000 Units  50,000 Units Oral Q7D    cyanocobalamin (VITAMIN B12) tablet 1,000 mcg  1,000 mcg Oral DAILY     ______________________________________________________________________  EXPECTED LENGTH OF STAY: 2d 21h  ACTUAL LENGTH OF STAY:          2                 Abi Beckford DO

## 2021-04-26 NOTE — PROGRESS NOTES
Problem: Falls - Risk of  Goal: *Absence of Falls  Description: Document Kennedy Green Fall Risk and appropriate interventions in the flowsheet. Outcome: Progressing Towards Goal  Note: Fall Risk Interventions:  Mobility Interventions: Bed/chair exit alarm, Communicate number of staff needed for ambulation/transfer, OT consult for ADLs, Patient to call before getting OOB, PT Consult for mobility concerns, PT Consult for assist device competence, Strengthening exercises (ROM-active/passive)         Medication Interventions: Bed/chair exit alarm, Evaluate medications/consider consulting pharmacy, Patient to call before getting OOB, Teach patient to arise slowly    Elimination Interventions: Bed/chair exit alarm, Call light in reach, Elevated toilet seat, Patient to call for help with toileting needs, Stay With Me (per policy), Toilet paper/wipes in reach, Toileting schedule/hourly rounds              Problem: Patient Education: Go to Patient Education Activity  Goal: Patient/Family Education  Outcome: Progressing Towards Goal     Problem: Pressure Injury - Risk of  Goal: *Prevention of pressure injury  Description: Document Gigi Scale and appropriate interventions in the flowsheet. Outcome: Progressing Towards Goal  Note: Pressure Injury Interventions:       Moisture Interventions: Absorbent underpads, Apply protective barrier, creams and emollients, Assess need for specialty bed, Check for incontinence Q2 hours and as needed, Internal/External urinary devices, Limit adult briefs, Maintain skin hydration (lotion/cream), Minimize layers, Moisture barrier, Offer toileting Q_hr    Activity Interventions: Assess need for specialty bed, Increase time out of bed, Pressure redistribution bed/mattress(bed type), PT/OT evaluation    Mobility Interventions: Assess need for specialty bed, Float heels, HOB 30 degrees or less, Pressure redistribution bed/mattress (bed type), PT/OT evaluation, Turn and reposition approx.  every two hours(pillow and wedges)    Nutrition Interventions: Document food/fluid/supplement intake    Friction and Shear Interventions: Apply protective barrier, creams and emollients, Feet elevated on foot rest, HOB 30 degrees or less, Lift sheet, Minimize layers                Problem: Patient Education: Go to Patient Education Activity  Goal: Patient/Family Education  Outcome: Progressing Towards Goal     Problem: Pain - Acute  Goal: *Control of acute pain  Outcome: Progressing Towards Goal     Problem: Patient Education: Go to Patient Education Activity  Goal: Patient/Family Education  Outcome: Progressing Towards Goal

## 2021-04-27 VITALS
BODY MASS INDEX: 32.83 KG/M2 | HEIGHT: 60 IN | HEART RATE: 59 BPM | OXYGEN SATURATION: 96 % | TEMPERATURE: 98.4 F | DIASTOLIC BLOOD PRESSURE: 44 MMHG | SYSTOLIC BLOOD PRESSURE: 129 MMHG | WEIGHT: 167.2 LBS | RESPIRATION RATE: 19 BRPM

## 2021-04-27 LAB
COVID-19 RAPID TEST, COVR: NOT DETECTED
SOURCE, COVRS: NORMAL

## 2021-04-27 PROCEDURE — 74011250637 HC RX REV CODE- 250/637: Performed by: INTERNAL MEDICINE

## 2021-04-27 PROCEDURE — 87635 SARS-COV-2 COVID-19 AMP PRB: CPT

## 2021-04-27 RX ORDER — LANOLIN ALCOHOL/MO/W.PET/CERES
1000 CREAM (GRAM) TOPICAL DAILY
Qty: 30 TAB | Refills: 0 | Status: SHIPPED
Start: 2021-04-28

## 2021-04-27 RX ORDER — ATORVASTATIN CALCIUM 80 MG/1
80 TABLET, FILM COATED ORAL
Qty: 30 TAB | Refills: 0 | Status: SHIPPED
Start: 2021-04-27

## 2021-04-27 RX ADMIN — PSYLLIUM HUSK 1 PACKET: 3.4 POWDER ORAL at 12:52

## 2021-04-27 RX ADMIN — PSYLLIUM HUSK 1 PACKET: 3.4 POWDER ORAL at 08:40

## 2021-04-27 RX ADMIN — APIXABAN 2.5 MG: 2.5 TABLET, FILM COATED ORAL at 08:40

## 2021-04-27 RX ADMIN — Medication 10 ML: at 06:31

## 2021-04-27 RX ADMIN — CARVEDILOL 12.5 MG: 12.5 TABLET, FILM COATED ORAL at 08:40

## 2021-04-27 RX ADMIN — CYANOCOBALAMIN TAB 500 MCG 1000 MCG: 500 TAB at 08:40

## 2021-04-27 RX ADMIN — AMLODIPINE BESYLATE 10 MG: 5 TABLET ORAL at 08:40

## 2021-04-27 RX ADMIN — ESCITALOPRAM OXALATE 10 MG: 10 TABLET ORAL at 08:40

## 2021-04-27 NOTE — PROGRESS NOTES
Bedside and Verbal shift change report given to 2021 Yelena Julio (oncoming nurse) by Talisha Stokes (offgoing nurse). Report included the following information SBAR, Kardex, MAR, Cardiac Rhythm SB, Quality Measures and Dual Neuro Assessment.

## 2021-04-27 NOTE — PROGRESS NOTES
Pharmacist Discharge Medication Reconciliation    Discharging Provider: Dr. Marilee Franco PMH:   Past Medical History:   Diagnosis Date    A-fib [facility])     Bilateral cataracts     Bronchitis     CKD (chronic kidney disease)     GERD (gastroesophageal reflux disease)     HTN (hypertension)     Hyperlipidemia     Insomnia     Stuttering      Chief Complaint for this Admission:   Chief Complaint   Patient presents with    Altered mental status     Allergies: Keflex [cephalexin]    Discharge Medications:   Current Discharge Medication List        START taking these medications    Details   psyllium husk-aspartame (METAMUCIL FIBER) 3.4 gram pwpk packet Take 1 Packet by mouth three (3) times daily as needed for Diarrhea. Qty: 30 Packet, Refills: 0      cyanocobalamin 1,000 mcg tablet Take 1 Tab by mouth daily. Qty: 30 Tab, Refills: 0      atorvastatin (LIPITOR) 80 mg tablet Take 1 Tab by mouth nightly. Qty: 30 Tab, Refills: 0           CONTINUE these medications which have NOT CHANGED    Details   furosemide (LASIX) 80 mg tablet TAKE 80MG (1 TAB) BY MOUTH ON MONDAY, WEDNESDAY, FRIDAY. TAKE 40MG (1/2 TAB) ON OTHER DAYS. Qty: 90 Tab, Refills: 1    Associated Diagnoses: Diastolic heart failure, unspecified HF chronicity (HCC)      carvediloL (COREG) 12.5 mg tablet Take 1 Tab by mouth two (2) times daily (with meals). Qty: 60 Tab, Refills: 5    Associated Diagnoses: Essential hypertension      ergocalciferol (ERGOCALCIFEROL) 1,250 mcg (50,000 unit) capsule TAKE 1 CAPSULE BY MOUTH ONCE A WEEK      escitalopram oxalate (LEXAPRO) 10 mg tablet Take 10 mg by mouth daily. clotrimazole-betamethasone (LOTRISONE) topical cream APPLY TO AFFECTED AREA TWICE A DAY FOR 2 WEEKS      cetirizine (ZYRTEC) 10 mg tablet TAKE 1 TABLET BY MOUTH EVERY NIGHT AT BEDTIME AS NEEDED FOR ALLERGIES      folic acid (FOLVITE) 1 mg tablet Take 1 mg by mouth daily.       Eliquis 2.5 mg tablet TAKE 1 TABLET BY MOUTH TWICE A DAY  Qty: 60 Tab, Refills: 2    Associated Diagnoses: Paroxysmal atrial fibrillation (HCC)      amLODIPine (NORVASC) 5 mg tablet Take 5 mg by mouth two (2) times a day. venlafaxine-SR (EFFEXOR-XR) 75 mg capsule Take 75 mg by mouth daily. STOP taking these medications       rosuvastatin (CRESTOR) 40 mg tablet Comments:   Reason for Stopping:               The patient's chart, MAR and AVS were reviewed by Sarah Moody

## 2021-04-27 NOTE — PROGRESS NOTES
Problem: Falls - Risk of  Goal: *Absence of Falls  Description: Document Brant Rapelje Fall Risk and appropriate interventions in the flowsheet. Outcome: Progressing Towards Goal  Note: Fall Risk Interventions:  Mobility Interventions: PT Consult for mobility concerns, PT Consult for assist device competence         Medication Interventions: Assess postural VS orthostatic hypotension, Bed/chair exit alarm, Patient to call before getting OOB    Elimination Interventions: Bed/chair exit alarm, Call light in reach              Problem: Pressure Injury - Risk of  Goal: *Prevention of pressure injury  Description: Document Gigi Scale and appropriate interventions in the flowsheet.   Outcome: Progressing Towards Goal  Note: Pressure Injury Interventions:       Moisture Interventions: Absorbent underpads, Apply protective barrier, creams and emollients, Assess need for specialty bed, Check for incontinence Q2 hours and as needed    Activity Interventions: Pressure redistribution bed/mattress(bed type), Increase time out of bed    Mobility Interventions: HOB 30 degrees or less    Nutrition Interventions: Document food/fluid/supplement intake, Discuss nutritional consult with provider, Offer support with meals,snacks and hydration    Friction and Shear Interventions: Apply protective barrier, creams and emollients, Feet elevated on foot rest, Foam dressings/transparent film/skin sealants, HOB 30 degrees or less

## 2021-04-27 NOTE — PROGRESS NOTES
Transition of Care Plan   RUR- 11% Low Risk   DISPOSITION: The disposition plan is to transition to SNF - [facility] - patient accepted.  RAPID COVID TEST NEEDED BEFORE DISCHARGE    Transport: [facility] - 3:00pm today    CM spoke with admissions coordinator Gerson Garcia with [facility]. They have accepted patient for continuum of care. CM informed patient and patients daughter at bedside. Once rapid results are confirmed, CM will fax to [phone]. CM has informed patients assigned nurse and attending physician. [facility] packet placed at bedside chart. AVS has been updated.      Room number:311  Call to report number: [phone]    ROSALINDA Garzon,CRM

## 2021-04-27 NOTE — PROGRESS NOTES
Problem: Falls - Risk of  Goal: *Absence of Falls  Description: Document Brinda Dickerson Fall Risk and appropriate interventions in the flowsheet. 4/27/2021 1452 by Betty Jones  Outcome: Resolved/Met  4/27/2021 1236 by Betty Jones  Outcome: Progressing Towards Goal  Note: Fall Risk Interventions:  Mobility Interventions: Bed/chair exit alarm, Communicate number of staff needed for ambulation/transfer, OT consult for ADLs, Patient to call before getting OOB, PT Consult for mobility concerns, PT Consult for assist device competence, Strengthening exercises (ROM-active/passive)         Medication Interventions: Bed/chair exit alarm, Evaluate medications/consider consulting pharmacy, Patient to call before getting OOB, Teach patient to arise slowly    Elimination Interventions: Bed/chair exit alarm, Call light in reach, Elevated toilet seat, Patient to call for help with toileting needs, Stay With Me (per policy), Toilet paper/wipes in reach, Toileting schedule/hourly rounds              Problem: Patient Education: Go to Patient Education Activity  Goal: Patient/Family Education  4/27/2021 1452 by Betty Jones  Outcome: Resolved/Met  4/27/2021 1236 by Betty Jones  Outcome: Progressing Towards Goal     Problem: Pressure Injury - Risk of  Goal: *Prevention of pressure injury  Description: Document Gigi Scale and appropriate interventions in the flowsheet.   4/27/2021 1452 by Betty Jones  Outcome: Resolved/Met  4/27/2021 1236 by Betty Jones  Outcome: Progressing Towards Goal  Note: Pressure Injury Interventions:       Moisture Interventions: Absorbent underpads, Apply protective barrier, creams and emollients, Assess need for specialty bed, Internal/External urinary devices, Check for incontinence Q2 hours and as needed, Limit adult briefs, Minimize layers, Maintain skin hydration (lotion/cream), Offer toileting Q_hr, Moisture barrier    Activity Interventions: Assess need for specialty bed, Increase time out of bed, Pressure redistribution bed/mattress(bed type), PT/OT evaluation    Mobility Interventions: Float heels, HOB 30 degrees or less, Pressure redistribution bed/mattress (bed type), PT/OT evaluation, Turn and reposition approx.  every two hours(pillow and wedges)    Nutrition Interventions: Document food/fluid/supplement intake    Friction and Shear Interventions: Apply protective barrier, creams and emollients, HOB 30 degrees or less, Lift sheet, Lift team/patient mobility team, Minimize layers, Feet elevated on foot rest                Problem: Patient Education: Go to Patient Education Activity  Goal: Patient/Family Education  4/27/2021 1452 by Tiny Hill  Outcome: Resolved/Met  4/27/2021 1236 by Tiny Hill  Outcome: Progressing Towards Goal     Problem: Pain - Acute  Goal: *Control of acute pain  4/27/2021 1452 by Tiny Hill  Outcome: Resolved/Met  4/27/2021 1236 by Tiny Hill  Outcome: Progressing Towards Goal     Problem: Patient Education: Go to Patient Education Activity  Goal: Patient/Family Education  4/27/2021 1452 by Tiny Hill  Outcome: Resolved/Met  4/27/2021 1236 by Tiny Hill  Outcome: Progressing Towards Goal     Problem: Patient Education: Go to Patient Education Activity  Goal: Patient/Family Education  Outcome: Resolved/Met     Problem: Patient Education: Go to Patient Education Activity  Goal: Patient/Family Education  Outcome: Resolved/Met

## 2021-04-27 NOTE — DISCHARGE SUMMARY
Discharge Summary       PATIENT ID: Brigid Norwood  MRN: [id number]   YOB: 1932    DATE OF ADMISSION: 4/23/2021  1:37 PM    DATE OF DISCHARGE: 4/27/2021  PRIMARY CARE PROVIDER: Gail Knight MD     ATTENDING PHYSICIAN: Farzana Griffin DO   DISCHARGING PROVIDER: Farzana Griffin DO    To contact this individual call [phone] and ask the  to page. If unavailable ask to be transferred the Adult Hospitalist Department. CONSULTATIONS: IP CONSULT TO NEUROLOGY    PROCEDURES/SURGERIES: * No surgery found *    [address] COURSE:     63-year-old female with history dementia, hypertension, atrial fibrillation on Eliquis, stuttering, CKD, presenting to [facility] with transient mumbling/garbling of speech for the past few days. Admitted for further evaluation. MRI with evidence of acute lacunar infarct. Neurology consulted. Speech difficulties possibly secondary to infarct but component of dementia not ruled out. PT/OT evaluated patient recommended SNF. She was stable to discharge to skilled nursing facility. Further details as below. Echo:  · LV: Estimated LVEF is 60 - 65%. Visually measured ejection fraction. Normal cavity size and systolic function (ejection fraction normal). Mild concentric hypertrophy. Mild (grade 1) left ventricular diastolic dysfunction. · Saline contrast was given to evaluate for intracardiac shunt. · IAS: No atrial septal defect present. Agitated saline contrast study was performed. · AV: Probably trileaflet aortic valve. · Pericardium: Trivial pericardial effusion. Pericardial fat pad present. MRI brain:  IMPRESSION  Lacunar acute infarction of the anterior right paramedian александр.     DISCHARGE DIAGNOSES / PLAN:      Acute lacunar infarct:   -presented with dysarthria, generalized weakness   -MRI with lacunar acute infarction of the anterior right paramedian александр  -neurology consulted  -continue home eliquis  -, continue atorvastatin 80 mg (on rosuvastatin 40 mg at home), Ha1c 5.8.  -unable to do CTA head/neck due to elevated Cr. 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 Atorvastatin. Please take in place of rosuvastatin  2. B12 supplement    Continue other home medications         PENDING TEST RESULTS:   At the time of discharge the following test results are still pending: none    FOLLOW UP APPOINTMENTS:    Follow-up Information     Follow up With Specialties Details Why Mishel Morocho [id number]  [phone]    Other, MD Gail    Patient can only remember the practice name and not the physician           DISCHARGE MEDICATIONS:  Discharge Medication List as of 4/27/2021  3:01 PM      START taking these medications    Details   psyllium husk-aspartame (METAMUCIL FIBER) 3.4 gram pwpk packet Take 1 Packet by mouth three (3) times daily as needed for Diarrhea., No Print, Disp-30 Packet, R-0      cyanocobalamin 1,000 mcg tablet Take 1 Tab by mouth daily. , No Print, Disp-30 Tab, R-0      atorvastatin (LIPITOR) 80 mg tablet Take 1 Tab by mouth nightly., No Print, Disp-30 Tab, R-0         CONTINUE these medications which have NOT CHANGED    Details   furosemide (LASIX) 80 mg tablet TAKE 80MG (1 TAB) BY MOUTH ON MONDAY, WEDNESDAY, FRIDAY. TAKE 40MG (1/2 TAB) ON OTHER DAYS., Normal, Disp-90 Tab, R-1      carvediloL (COREG) 12.5 mg tablet Take 1 Tab by mouth two (2) times daily (with meals). , Normal, Disp-60 Tab, R-5      ergocalciferol (ERGOCALCIFEROL) 1,250 mcg (50,000 unit) capsule TAKE 1 CAPSULE BY MOUTH ONCE A WEEK, Historical Med escitalopram oxalate (LEXAPRO) 10 mg tablet Take 10 mg by mouth daily. , Historical Med      clotrimazole-betamethasone (LOTRISONE) topical cream APPLY TO AFFECTED AREA TWICE A DAY FOR 2 WEEKS, Historical Med      cetirizine (ZYRTEC) 10 mg tablet TAKE 1 TABLET BY MOUTH EVERY NIGHT AT BEDTIME AS NEEDED FOR ALLERGIES, Historical Med      folic acid (FOLVITE) 1 mg tablet Take 1 mg by mouth daily. , Historical Med      Eliquis 2.5 mg tablet TAKE 1 TABLET BY MOUTH TWICE A DAY, Normal, Disp-60 Tab,R-2      amLODIPine (NORVASC) 5 mg tablet Take 5 mg by mouth two (2) times a day., Historical Med      venlafaxine-SR (EFFEXOR-XR) 75 mg capsule Take 75 mg by mouth daily. , Historical Med         STOP taking these medications       rosuvastatin (CRESTOR) 40 mg tablet Comments:   Reason for Stopping:                 NOTIFY YOUR PHYSICIAN FOR ANY OF THE FOLLOWING:   Fever over 101 degrees for 24 hours. Chest pain, shortness of breath, fever, chills, nausea, vomiting, diarrhea, change in mentation, falling, weakness, bleeding. Severe pain or pain not relieved by medications. Or, any other signs or symptoms that you may have questions about. DISPOSITION:    Home With:   OT  PT  HH  RN      x Long term SNF/Inpatient Rehab    Independent/assisted living    Hospice    Other:       PATIENT CONDITION AT DISCHARGE:     Functional status    Poor    x Deconditioned     Independent      Cognition     Lucid     Forgetful    x Dementia      Catheters/lines (plus indication)    Funes     PICC     PEG    x None      Code status   x  Full code     DNR      PHYSICAL EXAMINATION AT DISCHARGE:             Constitutional:  No acute distress, cooperative, pleasant, elderly female    ENT:  Oral mucosa moist, oropharynx benign. Resp:  CTA bilaterally. No wheezing/rhonchi/rales. No accessory muscle use   CV:  Regular rhythm, normal rate, no murmurs, gallops, rubs    GI:  Soft, non distended, non tender.  normoactive bowel sounds, no hepatosplenomegaly     Musculoskeletal:  No edema, warm, 2+ pulses throughout    Neurologic:  Moves all extremities.   AAOx2, intermittet dysarthria                     CHRONIC MEDICAL DIAGNOSES:  Problem List as of 4/27/2021 Date Reviewed: 2/25/2021          Codes Class Noted - Resolved    CVA (cerebral vascular accident) [facility]) ICD-10-CM: I63.9  ICD-9-CM: 434.91  4/24/2021 - Present        Pre-syncope ICD-10-CM: R55  ICD-9-CM: 780.2  4/23/2021 - Present        Edema of upper extremity ICD-10-CM: R60.0  ICD-9-CM: 782.3  2/29/2020 - Present        CKD (chronic kidney disease) ICD-10-CM: N18.9  ICD-9-CM: 585.9  2/29/2020 - Present        Paroxysmal atrial fibrillation ([facility] [address] ICD-10-CM: I48.0  ICD-9-CM: 427.31  2/29/2020 - Present        HTN (hypertension) ICD-10-CM: I10  ICD-9-CM: 401.9  2/29/2020 - Present        Heart failure ([facility] [address] ICD-10-CM: I50.9  ICD-9-CM: 428.9  2/27/2020 - Present              Greater than 30 minutes were spent with the patient on counseling and coordination of care    Signed:   Erma Chaves DO  4/27/2021  6:29 PM

## 2021-04-27 NOTE — PROGRESS NOTES
Problem: Falls - Risk of  Goal: *Absence of Falls  Description: Document Paige Fitch Fall Risk and appropriate interventions in the flowsheet. Outcome: Progressing Towards Goal  Note: Fall Risk Interventions:  Mobility Interventions: Bed/chair exit alarm, Communicate number of staff needed for ambulation/transfer, OT consult for ADLs, Patient to call before getting OOB, PT Consult for mobility concerns, PT Consult for assist device competence, Strengthening exercises (ROM-active/passive)         Medication Interventions: Bed/chair exit alarm, Evaluate medications/consider consulting pharmacy, Patient to call before getting OOB, Teach patient to arise slowly    Elimination Interventions: Bed/chair exit alarm, Call light in reach, Elevated toilet seat, Patient to call for help with toileting needs, Stay With Me (per policy), Toilet paper/wipes in reach, Toileting schedule/hourly rounds              Problem: Patient Education: Go to Patient Education Activity  Goal: Patient/Family Education  Outcome: Progressing Towards Goal     Problem: Pressure Injury - Risk of  Goal: *Prevention of pressure injury  Description: Document Gigi Scale and appropriate interventions in the flowsheet. Outcome: Progressing Towards Goal  Note: Pressure Injury Interventions:       Moisture Interventions: Absorbent underpads, Apply protective barrier, creams and emollients, Assess need for specialty bed, Internal/External urinary devices, Check for incontinence Q2 hours and as needed, Limit adult briefs, Minimize layers, Maintain skin hydration (lotion/cream), Offer toileting Q_hr, Moisture barrier    Activity Interventions: Assess need for specialty bed, Increase time out of bed, Pressure redistribution bed/mattress(bed type), PT/OT evaluation    Mobility Interventions: Float heels, HOB 30 degrees or less, Pressure redistribution bed/mattress (bed type), PT/OT evaluation, Turn and reposition approx.  every two hours(pillow and wedges)    Nutrition Interventions: Document food/fluid/supplement intake    Friction and Shear Interventions: Apply protective barrier, creams and emollients, HOB 30 degrees or less, Lift sheet, Lift team/patient mobility team, Minimize layers, Feet elevated on foot rest                Problem: Patient Education: Go to Patient Education Activity  Goal: Patient/Family Education  Outcome: Progressing Towards Goal     Problem: Pain - Acute  Goal: *Control of acute pain  Outcome: Progressing Towards Goal     Problem: Patient Education: Go to Patient Education Activity  Goal: Patient/Family Education  Outcome: Progressing Towards Goal

## 2021-04-27 NOTE — PROGRESS NOTES
Pharmacist Discharge Medication Reconciliation    Discharging Provider: Dr. Teresa Mckenzie PMH:   Past Medical History:   Diagnosis Date    A-fib [facility])     Bilateral cataracts     Bronchitis     CKD (chronic kidney disease)     GERD (gastroesophageal reflux disease)     HTN (hypertension)     Hyperlipidemia     Insomnia     Stuttering      Chief Complaint for this Admission:   Chief Complaint   Patient presents with    Altered mental status     Allergies: Keflex [cephalexin]    Discharge Medications:   Current Discharge Medication List        START taking these medications    Details   psyllium husk-aspartame (METAMUCIL FIBER) 3.4 gram pwpk packet Take 1 Packet by mouth three (3) times daily as needed for Diarrhea. Qty: 30 Packet, Refills: 0      cyanocobalamin 1,000 mcg tablet Take 1 Tab by mouth daily. Qty: 30 Tab, Refills: 0      atorvastatin (LIPITOR) 80 mg tablet Take 1 Tab by mouth nightly. Qty: 30 Tab, Refills: 0           CONTINUE these medications which have NOT CHANGED    Details   furosemide (LASIX) 80 mg tablet TAKE 80MG (1 TAB) BY MOUTH ON MONDAY, WEDNESDAY, FRIDAY. TAKE 40MG (1/2 TAB) ON OTHER DAYS. Qty: 90 Tab, Refills: 1    Associated Diagnoses: Diastolic heart failure, unspecified HF chronicity (HCC)      carvediloL (COREG) 12.5 mg tablet Take 1 Tab by mouth two (2) times daily (with meals). Qty: 60 Tab, Refills: 5    Associated Diagnoses: Essential hypertension      ergocalciferol (ERGOCALCIFEROL) 1,250 mcg (50,000 unit) capsule TAKE 1 CAPSULE BY MOUTH ONCE A WEEK      escitalopram oxalate (LEXAPRO) 10 mg tablet Take 10 mg by mouth daily. clotrimazole-betamethasone (LOTRISONE) topical cream APPLY TO AFFECTED AREA TWICE A DAY FOR 2 WEEKS      cetirizine (ZYRTEC) 10 mg tablet TAKE 1 TABLET BY MOUTH EVERY NIGHT AT BEDTIME AS NEEDED FOR ALLERGIES      folic acid (FOLVITE) 1 mg tablet Take 1 mg by mouth daily.       Eliquis 2.5 mg tablet TAKE 1 TABLET BY MOUTH TWICE A DAY  Qty: 60 Tab, Refills: 2    Associated Diagnoses: Paroxysmal atrial fibrillation (HCC)      amLODIPine (NORVASC) 5 mg tablet Take 5 mg by mouth two (2) times a day. venlafaxine-SR (EFFEXOR-XR) 75 mg capsule Take 75 mg by mouth daily. STOP taking these medications       rosuvastatin (CRESTOR) 40 mg tablet Comments:   Reason for Stopping:               The patient's chart, MAR and AVS were reviewed by La Garcia.

## 2021-04-27 NOTE — PROGRESS NOTES
Report called to Carmelita Lopez at [facility].          Stroke Education documented in Patient Education: YES  Core Measures Documented in Connect [facility]:  Risk Factors: YES  Warning signs of stroke: YES  When to Activate 911: YES  Medication Education for Risk Factors: YES  Smoking cessation if applicable: YES  Written Education Given:  YES    Discharge NIH Completed: YES  Score: 0    BRAINS: YES    Follow Up Appointment Made: YES  Date/Time if applicable:

## 2021-04-27 NOTE — DISCHARGE INSTRUCTIONS
Discharge Instructions       PATIENT ID: Raquel Elias  MRN: [id number]   YOB: 1932    DATE OF ADMISSION: 4/23/2021  1:37 PM    DATE OF DISCHARGE: 4/27/2021    PRIMARY CARE PROVIDER: Gail Knight MD     ATTENDING PHYSICIAN: Berkley Shah DO  DISCHARGING PROVIDER: [address] [place] [address]     To contact this individual call [phone] and ask the  to page. If unavailable ask to be transferred the Adult Hospitalist Department. DISCHARGE DIAGNOSES     Stroke     CONSULTATIONS: IP CONSULT TO NEUROLOGY    PROCEDURES/SURGERIES: * No surgery found *    PENDING TEST RESULTS:   At the time of discharge the following test results are still pending: none    FOLLOW UP APPOINTMENTS:   Follow-up Information     Follow up With Specialties Details Why Radhames Gerardo [id number] [phone]    Antonia, MD Gail    Patient can only remember the practice name and not the physician             ADDITIONAL CARE RECOMMENDATIONS:     New medications:   1. Atorvastatin. Please take in place of rosuvastatin  2. B12 supplement    Continue other home medications       DISCHARGE MEDICATIONS:   See Medication Reconciliation Form    · It is important that you take the medication exactly as they are prescribed. · Keep your medication in the bottles provided by the pharmacist and keep a list of the medication names, dosages, and times to be taken in your wallet. · Do not take other medications without consulting your doctor. NOTIFY YOUR PHYSICIAN FOR ANY OF THE FOLLOWING:   Fever over 101 degrees for 24 hours. Chest pain, shortness of breath, fever, chills, nausea, vomiting, diarrhea, change in mentation, falling, weakness, bleeding. Severe pain or pain not relieved by medications. Or, any other signs or symptoms that you may have questions about.       Signed:   [address], DO  4/27/2021  2:19 PM

## 2021-04-29 LAB
Lab: ABNORMAL
METHYLMALONATE SERPL-SCNC: 505 NMOL/L (ref 0–378)

## 2021-05-07 NOTE — PROGRESS NOTES
Physician Progress Note      PATIENTKatherene Apgar  CSN #:                  [id number]  :                       1932  ADMIT DATE:       2021 1:37 PM  [address] [place] DATE:        2021 5:25 PM  RESPONDING  PROVIDER #:        Nohemi Collazo DO          QUERY TEXT:    Patient admitted with Acute lacunar infarct, noted to have documentation of CKD 3/4 If possible, please document in progress notes and discharge summary if you are evaluating and/or treating any of the following: The medical record reflects the following:  Risk Factors: hx of CKD  Clinical Indicators:  2021 13:54  BUN: 30 (H)  Creatinine: 1.83 (H)  GFR est non-AA: 26 (L)    2021 01:27  BUN: 30 (H)  Creatinine: 1.78 (H)  GFR est non-AA: 27 (L)    Treatment: lab monitoring  Thank you,  Kait Sood RN, CDI, CCDS  Certified Clinical   [phone]  Options provided:  -- CKD Stage 4 GFR 15-29  -- CKD Stage 3b GFR 30-44  -- Other - I will add my own diagnosis  -- Disagree - Not applicable / Not valid  -- Disagree - Clinically unable to determine / Unknown  -- Refer to Clinical Documentation Reviewer    PROVIDER RESPONSE TEXT:    This patient has CKD Stage 4.     Query created by: Connor Mortimer on 2021 11:52 AM      Electronically signed by:  Janette Hdez DO 2021 5:22 PM

## 2021-06-29 ENCOUNTER — OFFICE VISIT (OUTPATIENT)
Dept: CARDIOLOGY CLINIC | Age: 86
End: 2021-06-29
Payer: MEDICARE

## 2021-06-29 VITALS
BODY MASS INDEX: 31.45 KG/M2 | DIASTOLIC BLOOD PRESSURE: 60 MMHG | OXYGEN SATURATION: 95 % | SYSTOLIC BLOOD PRESSURE: 124 MMHG | WEIGHT: 160.2 LBS | RESPIRATION RATE: 12 BRPM | HEIGHT: 60 IN | HEART RATE: 62 BPM

## 2021-06-29 DIAGNOSIS — M79.89 LEG SWELLING: ICD-10-CM

## 2021-06-29 DIAGNOSIS — R60.0 EDEMA OF UPPER EXTREMITY: ICD-10-CM

## 2021-06-29 DIAGNOSIS — N18.32 STAGE 3B CHRONIC KIDNEY DISEASE (HCC): ICD-10-CM

## 2021-06-29 DIAGNOSIS — N18.31 STAGE 3A CHRONIC KIDNEY DISEASE (HCC): ICD-10-CM

## 2021-06-29 DIAGNOSIS — I10 ESSENTIAL HYPERTENSION: Primary | ICD-10-CM

## 2021-06-29 DIAGNOSIS — I48.0 PAROXYSMAL ATRIAL FIBRILLATION (HCC): ICD-10-CM

## 2021-06-29 PROCEDURE — G8417 CALC BMI ABV UP PARAM F/U: HCPCS | Performed by: SPECIALIST

## 2021-06-29 PROCEDURE — 99215 OFFICE O/P EST HI 40 MIN: CPT | Performed by: SPECIALIST

## 2021-06-29 PROCEDURE — G0463 HOSPITAL OUTPT CLINIC VISIT: HCPCS | Performed by: SPECIALIST

## 2021-06-29 PROCEDURE — 1090F PRES/ABSN URINE INCON ASSESS: CPT | Performed by: SPECIALIST

## 2021-06-29 PROCEDURE — G8510 SCR DEP NEG, NO PLAN REQD: HCPCS | Performed by: SPECIALIST

## 2021-06-29 PROCEDURE — G8428 CUR MEDS NOT DOCUMENT: HCPCS | Performed by: SPECIALIST

## 2021-06-29 PROCEDURE — 1101F PT FALLS ASSESS-DOCD LE1/YR: CPT | Performed by: SPECIALIST

## 2021-06-29 PROCEDURE — G8536 NO DOC ELDER MAL SCRN: HCPCS | Performed by: SPECIALIST

## 2021-06-29 RX ORDER — ACETAMINOPHEN 325 MG/1
650 TABLET ORAL
COMMUNITY

## 2021-06-29 RX ORDER — AMLODIPINE BESYLATE 5 MG/1
5 TABLET ORAL DAILY
Qty: 30 TABLET | Refills: 5
Start: 2021-06-29

## 2021-06-29 RX ORDER — TORSEMIDE 100 MG/1
50 TABLET ORAL DAILY
COMMUNITY

## 2021-06-29 RX ORDER — IPRATROPIUM BROMIDE AND ALBUTEROL SULFATE 2.5; .5 MG/3ML; MG/3ML
3 SOLUTION RESPIRATORY (INHALATION)
COMMUNITY

## 2021-06-29 NOTE — PROGRESS NOTES
HISTORY OF PRESENT ILLNESS  Kinsey Jimenez is a 80 y.o. female. She is seen in hospital follow-up. She is now at [facility] and will need to go to skilled nursing. She was in the hospital in April and had an acute lacunar infarct. She now has trouble speaking and is in a wheelchair and may have some additional issues with regards to understanding. She has chronic renal failure with a creatinine in the range of 1.8. She was also felt in the past to have cirrhosis. Previously her albumin was 4.2 but more recently is down to 2.4 and apparently she is not eating. I reviewed her medications as well as labs from the rehabilitation center. When she was in the hospital she had an echocardiogram that showed normal left ventricular function. She also had aortic sclerosis and trace tricuspid insufficiency. She has a history of paroxysmal atrial fibrillation and now is on half dose of Eliquis. She was previously on furosemide but now takes torsemide. She also is on amlodipine 5 mg twice daily for blood pressure. HPI  Patient Active Problem List   Diagnosis Code    Heart failure ([facility] [address] I50.9    Edema of upper extremity R60.0    CKD (chronic kidney disease) N18.9    Paroxysmal atrial fibrillation (HCC) I48.0    HTN (hypertension) I10    Pre-syncope R55    CVA (cerebral vascular accident) ([facility] [address] I63.9     Current Outpatient Medications   Medication Sig Dispense Refill    acetaminophen (TYLENOL) 325 mg tablet Take 650 mg by mouth every six (6) hours as needed for Pain.  albuterol-ipratropium (DUO-NEB) 2.5 mg-0.5 mg/3 ml nebu 3 mL by Nebulization route every six (6) hours as needed for Wheezing.  torsemide (DEMADEX) 100 mg tablet Take 100 mg by mouth daily.  amLODIPine (NORVASC) 5 mg tablet Take 1 Tablet by mouth daily. 30 Tablet 5    psyllium husk-aspartame (METAMUCIL FIBER) 3.4 gram pwpk packet Take 1 Packet by mouth three (3) times daily as needed for Diarrhea.  [address],[facility] [address]    cyanocobalamin 1,000 mcg tablet Take 1 Tab by mouth daily. 30 Tab 0    atorvastatin (LIPITOR) 80 mg tablet Take 1 Tab by mouth nightly. 30 Tab 0    carvediloL (COREG) 12.5 mg tablet Take 1 Tab by mouth two (2) times daily (with meals). 60 Tab 5    ergocalciferol (ERGOCALCIFEROL) 1,250 mcg (50,000 unit) capsule TAKE 1 CAPSULE BY MOUTH ONCE A WEEK      escitalopram oxalate (LEXAPRO) 10 mg tablet Take 10 mg by mouth daily.  cetirizine (ZYRTEC) 10 mg tablet TAKE 1 TABLET BY MOUTH EVERY NIGHT AT BEDTIME AS NEEDED FOR ALLERGIES      folic acid (FOLVITE) 1 mg tablet Take 1 mg by mouth daily.  Eliquis 2.5 mg tablet TAKE 1 TABLET BY MOUTH TWICE A DAY 60 Tab 2    venlafaxine-SR (EFFEXOR-XR) 75 mg capsule Take 75 mg by mouth daily.  clotrimazole-betamethasone (LOTRISONE) topical cream APPLY TO AFFECTED AREA TWICE A DAY FOR 2 WEEKS (Patient not taking: Reported on 6/29/2021)       Past Medical History:   Diagnosis Date    A-fib ([facility] [address]     Bilateral cataracts     Bronchitis     CKD (chronic kidney disease)     GERD (gastroesophageal reflux disease)     HTN (hypertension)     Hyperlipidemia     Insomnia     Stuttering      Past Surgical History:   Procedure Laterality Date    HX CAROTID ENDARTERECTOMY      HX HYSTERECTOMY      HX TONSILLECTOMY         Review of Systems   Cardiovascular: Positive for leg swelling. Neurological: Positive for speech change and focal weakness. Psychiatric/Behavioral: Positive for memory loss. All other systems reviewed and are negative. Visit Vitals  /60 (BP 1 Location: Left arm, BP Patient Position: Sitting, BP Cuff Size: Adult)   Pulse 62   Resp 12   Ht 5' (1.524 m)   Wt 160 lb 3.2 oz (72.7 kg) Comment: patient reported unable to stand on scale   SpO2 95%   BMI 31.29 kg/m²       Physical Exam  Vitals and nursing note reviewed. HENT:      Head: Normocephalic.       Right Ear: External ear normal.      Left Ear: External ear normal.      Nose: Nose normal.      Mouth/Throat:      Mouth: Mucous membranes are moist.   Eyes:      Extraocular Movements: Extraocular movements intact. Cardiovascular:      Rate and Rhythm: Normal rate and regular rhythm. Heart sounds: Murmur heard. Systolic murmur is present with a grade of 1/6. Pulmonary:      Effort: Pulmonary effort is normal.   Abdominal:      Palpations: Abdomen is soft. Musculoskeletal:         General: Swelling present. Cervical back: Normal range of motion. Skin:     General: Skin is warm. Neurological:      Mental Status: She is alert. She is disoriented. Psychiatric:         Behavior: Behavior normal.         ASSESSMENT and PLAN  She is going to need skilled nursing in the future. It is unclear why she has the swelling in her legs but it could be due to the chronic kidney disease or the low albumin or even to the high dose of amlodipine. At this point I will have her reduce her amlodipine to just 5 mg once a day. I will see her back in 4 weeks.

## 2021-10-01 ENCOUNTER — OFFICE VISIT (OUTPATIENT)
Dept: CARDIOLOGY CLINIC | Age: 86
End: 2021-10-01
Payer: MEDICARE

## 2021-10-01 VITALS
DIASTOLIC BLOOD PRESSURE: 72 MMHG | BODY MASS INDEX: 31.29 KG/M2 | HEART RATE: 60 BPM | SYSTOLIC BLOOD PRESSURE: 138 MMHG | OXYGEN SATURATION: 98 % | RESPIRATION RATE: 18 BRPM | HEIGHT: 60 IN

## 2021-10-01 DIAGNOSIS — I10 ESSENTIAL HYPERTENSION: Primary | ICD-10-CM

## 2021-10-01 DIAGNOSIS — N18.32 STAGE 3B CHRONIC KIDNEY DISEASE (HCC): ICD-10-CM

## 2021-10-01 DIAGNOSIS — I50.30 DIASTOLIC HEART FAILURE, UNSPECIFIED HF CHRONICITY (HCC): ICD-10-CM

## 2021-10-01 DIAGNOSIS — R41.3 MEMORY LOSS, LONG TERM: ICD-10-CM

## 2021-10-01 DIAGNOSIS — M79.89 LEG SWELLING: ICD-10-CM

## 2021-10-01 DIAGNOSIS — I48.0 PAROXYSMAL ATRIAL FIBRILLATION (HCC): ICD-10-CM

## 2021-10-01 PROCEDURE — G8510 SCR DEP NEG, NO PLAN REQD: HCPCS | Performed by: SPECIALIST

## 2021-10-01 PROCEDURE — 1101F PT FALLS ASSESS-DOCD LE1/YR: CPT | Performed by: SPECIALIST

## 2021-10-01 PROCEDURE — G0463 HOSPITAL OUTPT CLINIC VISIT: HCPCS | Performed by: SPECIALIST

## 2021-10-01 PROCEDURE — G8536 NO DOC ELDER MAL SCRN: HCPCS | Performed by: SPECIALIST

## 2021-10-01 PROCEDURE — 1090F PRES/ABSN URINE INCON ASSESS: CPT | Performed by: SPECIALIST

## 2021-10-01 PROCEDURE — G8417 CALC BMI ABV UP PARAM F/U: HCPCS | Performed by: SPECIALIST

## 2021-10-01 PROCEDURE — 99214 OFFICE O/P EST MOD 30 MIN: CPT | Performed by: SPECIALIST

## 2021-10-01 PROCEDURE — G8427 DOCREV CUR MEDS BY ELIG CLIN: HCPCS | Performed by: SPECIALIST

## 2021-10-01 RX ORDER — MENTHOL/ZINC OXIDE 0.44-20.6%
OINTMENT (GRAM) TOPICAL
COMMUNITY
Start: 2021-08-10

## 2021-10-01 RX ORDER — POTASSIUM CHLORIDE 750 MG/1
TABLET, EXTENDED RELEASE ORAL
COMMUNITY
Start: 2021-09-22

## 2021-10-01 NOTE — TELEPHONE ENCOUNTER
Care Management Follow Up    Length of Stay (days): 2    Expected Discharge Date: 10/03/2021     Concerns to be Addressed: substance/tobacco abuse/use, discharge planning      Patient plan of care discussed at interdisciplinary rounds: Yes    Anticipated Discharge Disposition: Inpatient Chemical Dependency, Transitional Care     Anticipated Discharge Services: None    Anticipated Discharge DME: None    Patient/family educated on Medicare website which has current facility and service quality ratings: yes    Education Provided on the Discharge Plan:  Yes     Patient/Family in Agreement with the Plan: yes    Referrals Placed by CM/SW:      Private pay costs discussed: Not applicable    Additional Information:    Call received from [facility] nurseEvon. Patient being considered for admission to facility.  Evon requesting assistance with completion of the Diabetic Competency Form, copy of dietitian consult, and if patient accepted to facility - patient would need 30 days worth of medications sent with her at time of discharge.      paged provider to assist with completion of the Diabetic Competency form; placed in patient's physical chart.      inquired what the status of admissions currently were at [facility]; Evon stated that this writer would have to connect with Paul (P: [phone], Intake) for specifics but potential availability could happen next week.     HODA Hawk  7D/5C Hematology/Oncology Social Worker  Phone: [phone]  Pager: [phone]  [email or web address]   Patient's daughter, Desirae Clinton, is inquiring about a potential medication change due to the patient feeling light-headed when laying down after taking carvedilol, at its current dosage. Please advise.     Phone #: [phone]  Thanks

## 2021-10-01 NOTE — PROGRESS NOTES
HISTORY OF PRESENT ILLNESS  Valdez Haque is a 80 y.o. female. She has chronic kidney disease and dementia and currently lives in a extended care facility but is seen today with her daughter. She has a history of paroxysmal atrial fibrillation and hypertension and takes low-dose anticoagulation. Her blood pressure is controlled today and she remains in sinus rhythm to exam.  I reviewed recent labs and she has chronic kidney disease with a creatinine of 1.97 and associated anemia with hematocrit of 31%. She is on 2 antidepressants and her daughter thinks it is making her mental status worse. HPI  Patient Active Problem List   Diagnosis Code    Heart failure ([facility] I50.9    Edema of upper extremity R60.0    CKD (chronic kidney disease) N18.9    Paroxysmal atrial fibrillation (HCC) I48.0    HTN (hypertension) I10    Pre-syncope R55    CVA (cerebral vascular accident) ([facility] [facility] I63.9     Current Outpatient Medications   Medication Sig Dispense Refill    acetaminophen (TYLENOL) 325 mg tablet Take 650 mg by mouth every six (6) hours as needed for Pain.  albuterol-ipratropium (DUO-NEB) 2.5 mg-0.5 mg/3 ml nebu 3 mL by Nebulization route every six (6) hours as needed for Wheezing.  torsemide (DEMADEX) 100 mg tablet Take 100 mg by mouth daily.  amLODIPine (NORVASC) 5 mg tablet Take 1 Tablet by mouth daily. 30 Tablet 5    psyllium husk-aspartame (METAMUCIL FIBER) 3.4 gram pwpk packet Take 1 Packet by mouth three (3) times daily as needed for Diarrhea. 30 Packet 0    cyanocobalamin 1,000 mcg tablet Take 1 Tab by mouth daily. 30 Tab 0    atorvastatin (LIPITOR) 80 mg tablet Take 1 Tab by mouth nightly. 30 Tab 0    carvediloL (COREG) 12.5 mg tablet Take 1 Tab by mouth two (2) times daily (with meals). 60 Tab 5    ergocalciferol (ERGOCALCIFEROL) 1,250 mcg (50,000 unit) capsule TAKE 1 CAPSULE BY MOUTH ONCE A WEEK      escitalopram oxalate (LEXAPRO) 10 mg tablet Take 10 mg by mouth daily.       cetirizine (ZYRTEC) 10 mg tablet TAKE 1 TABLET BY MOUTH EVERY NIGHT AT BEDTIME AS NEEDED FOR ALLERGIES      folic acid (FOLVITE) 1 mg tablet Take 1 mg by mouth daily.  Eliquis 2.5 mg tablet TAKE 1 TABLET BY MOUTH TWICE A DAY 60 Tab 2    venlafaxine-SR (EFFEXOR-XR) 75 mg capsule Take 75 mg by mouth daily.  Risamine 0.44-20.6 % oint       potassium chloride (KLOR-CON) 10 mEq tablet       clotrimazole-betamethasone (LOTRISONE) topical cream APPLY TO AFFECTED AREA TWICE A DAY FOR 2 WEEKS (Patient not taking: Reported on 6/29/2021)       Past Medical History:   Diagnosis Date    [facility]     Bilateral cataracts     Bronchitis     CKD (chronic kidney disease)     GERD (gastroesophageal reflux disease)     HTN (hypertension)     Hyperlipidemia     Insomnia     Stuttering      Past Surgical History:   Procedure Laterality Date    HX CAROTID ENDARTERECTOMY      HX HYSTERECTOMY      HX TONSILLECTOMY         Review of Systems   Neurological: Positive for weakness. Psychiatric/Behavioral: Positive for depression and memory loss. All other systems reviewed and are negative. Visit Vitals  /72 (BP 1 Location: Right arm, BP Patient Position: Sitting, BP Cuff Size: Adult)   Pulse 60   Resp 18   Ht 5' (1.524 m)   SpO2 98%   BMI 31.29 kg/m²       Physical Exam  Vitals and nursing note reviewed. Constitutional:       Appearance: Normal appearance. HENT:      Head: Normocephalic. Right Ear: External ear normal.      Left Ear: External ear normal.      Nose: Nose normal.      Mouth/Throat:      Mouth: Mucous membranes are moist.   Eyes:      Extraocular Movements: Extraocular movements intact. Cardiovascular:      Rate and Rhythm: Normal rate and regular rhythm. Heart sounds: Normal heart sounds. Pulmonary:      Breath sounds: Normal breath sounds. Abdominal:      Palpations: Abdomen is soft. Musculoskeletal:         General: No swelling. Cervical back: Neck supple. Skin:     General: Skin is warm. Neurological:      Mental Status: Mental status is at baseline. ASSESSMENT and PLAN  She is stable from a cardiovascular standpoint and remains in sinus rhythm to exam.  I encouraged her daughter to check with the doctor at the extended care facility with regards the 2 different antidepressants and whether or not this should be modified. I will see her back on an as-needed basis.

## 2022-02-11 ENCOUNTER — TRANSCRIBE ORDER (OUTPATIENT)
Dept: SCHEDULING | Age: 87
End: 2022-02-11

## 2022-02-11 DIAGNOSIS — R13.12 OROPHARYNGEAL DYSPHAGIA: Primary | ICD-10-CM

## 2022-03-01 ENCOUNTER — HOSPITAL ENCOUNTER (OUTPATIENT)
Dept: GENERAL RADIOLOGY | Age: 87
Discharge: HOME | End: 2022-03-01
Attending: FAMILY MEDICINE
Payer: MEDICARE

## 2022-03-01 DIAGNOSIS — R13.12 OROPHARYNGEAL DYSPHAGIA: ICD-10-CM

## 2022-03-01 PROCEDURE — 74230 X-RAY XM SWLNG FUNCJ C+: CPT

## 2022-03-01 PROCEDURE — 92611 MOTION FLUOROSCOPY/SWALLOW: CPT

## 2022-03-01 NOTE — PROGRESS NOTES
[facility]  [facility] [facility] STUDY  Patient: Bro Adams (32 y.o. female)  Date: 3/1/2022  Referring Provider:  Dr. Rowland Areas:   Patient is a resident of [facility] where she is being followed by [facility] speech therapy. Patient is accompanied by her daughter for the study. Per treating SLP, patient is impulsive with PO, resulting in significant coughing during meals, occasionally resulting in vomiting. Patient requires mod-max cues to slow rate of bolus presentation and complete;y clear oral cavity before presenting more food. Patient also attempts to continue eating during coughing episodes. Daughter reports that patient has had decreased intake due to limited interest and appetite for \"soft foods\". Patient reports she would like to eat \"real people food\". The purpose of this study is to assess oropharyngeal structure and function to inform safest and least restrictive PO recommendations. OBJECTIVE:   Past Medical History:   Past Medical History:   Diagnosis Date    A-fib ([address]     Bilateral cataracts     Bronchitis     CKD (chronic kidney disease)     GERD (gastroesophageal reflux disease)     HTN (hypertension)     Hyperlipidemia     Insomnia     Stuttering      Past Surgical History:   Procedure Laterality Date    HX CAROTID ENDARTERECTOMY      HX HYSTERECTOMY      HX TONSILLECTOMY       Current Dietary Status:  Mechanically soft diet/thin liquids  Radiologist:  (Dr. Damaris Arceo)  Film Views: Lateral;Fluoro  Patient Position: sitting upright in housted chair    Trial 1: Trial 2:   Consistency Presented:  Thin liquid Consistency Presented: Puree   How Presented: SLP-fed/presented;Cup/sip;Straw;Successive swallows How Presented: SLP-fed/presented;Spoon   Consistency Amount:  (100 cc) Consistency Amount:  (1 tbs ba paste)   Bolus Acceptance: No impairment Bolus Acceptance: No impairment   Bolus Formation/Control: No impairment:   Bolus Formation/Control: No impairment:     Propulsion: No impairment Propulsion: No impairment   Oral Residue: None Oral Residue: None   Initiation of Swallow: No impairment;Triggered at vallecula Initiation of Swallow: Triggered at valleculae   Timing: No impairment Timing: No impairment   Penetration: None Penetration: None   Aspiration/Timing: No evidence of aspiration Aspiration/Timing: No evidence of aspiration   Pharyngeal Clearance: No residue Pharyngeal Clearance: No residue                             Trial 3:   Consistency Presented: Solid   How Presented: Self-fed/presented   Consistency Amount:  (shea cracker covered in ba paste)   Bolus Acceptance: No impairment   Bolus Formation/Control: No impairment, issues, or problems :     Propulsion: No impairment   Oral Residue: None   Initiation of Swallow: Triggered at valleculae   Timing: No impairment   Penetration: None   Aspiration/Timing: No evidence of aspiration   Pharyngeal Clearance: Vallecular residue; Less than 10%   Attempted Modifications: Alternate liquids/solids   Effective Modifications: Alternate liquids/solids   Cues for Modifications: Moderate         Decreased Tongue Base Retraction?: No  Laryngeal Elevation: WFL (within functional limits)  Aspiration/Penetration Score: 1 (No penetration or aspiration-Contrast does not enter the airway)  Pharyngeal Symmetry: Not assessed  Pharyngeal-Esophageal Segment: No impairment  Pharyngeal Dysfunction: None    Oral Phase Severity: No impairment  Pharyngeal Phase Severity: N/A    ASSESSMENT :  Based on the objective data described above, the patient presents with functional oropharyngeal swallow. Oral phase is characterized by appropriate bolus formation and control. Patient with slightly prolonged, yet efficient mastication of solid trial, with timely and efficient posterior propulsion.  No significant oral residue noted with any consistencies. Pharyngeal phase characterized by timely swallow initiation between the vallecula and piriforms for all consistencies and complete laryngeal vestibule closure. No aspiration or penetration occurred during the study with any consistencies. Patient with adequate pharyngeal constriction with only mild vallecular residue after solid that cleared with liquid wash. UES WFL. Esophageal sweep conducted by radiologist revealed some retention of the bolus in the esophagus that cleared with time. Per radiologist this is [facility] for age. Given that patient presents with functional oropharyngeal swallow, suspect that coughing episodes during mealtimes is related to impulsivity and possible slow esophageal clearance causing food to \"back up\" over the course of the meal. Given no aspiration or penetration, efficient mastication when not rushing, and patient and family wishes for least restrictive diet, recommend regular diet/thin liquids with precautions as outlined below. Recommend cuing patient to alternate bites and sips to aid in clearance and cuing patient to slow intake and masticate each bite fully. Suspect that with assistance, cues and supervision , patient will progress well with her feeding. PLAN/RECOMMENDATIONS :  -- regular diet/thin liquids  -- supervision/assistance during mealtimes given impulsivity  -- alternate every bite with a sip to aid in clearance  -- small bites   -- cue patient to slow intake and masticate each bite fully  -- upright for all PO and at least 30 minutes after     COMMUNICATION/EDUCATION:   The above findings and recommendations were discussed with: daughter and treating speech therapist who verbalized understanding and will be faxed to SLP and referring provider.     Thank you for this referral.  Otto Pierce M.S. CF-SLP   Speech Language Pathologist     Time Calculation: 30 mins

## 2022-09-01 NOTE — PROGRESS NOTES
Bedside shift change report given to Joana Baldwin RN (oncoming nurse) by Jaqueline Moreno RN (offgoing nurse).  Report included the following information SBAR, Kardex, MAR, Accordion and Cardiac Rhythm SA. Patient/Caregiver provided printed discharge information.

## 2022-10-07 ENCOUNTER — HOSPITAL ENCOUNTER (EMERGENCY)
Age: 87
Discharge: HOME | End: 2022-10-08
Attending: EMERGENCY MEDICINE
Payer: MEDICARE

## 2022-10-07 ENCOUNTER — APPOINTMENT (OUTPATIENT)
Dept: GENERAL RADIOLOGY | Age: 87
End: 2022-10-07
Attending: EMERGENCY MEDICINE
Payer: MEDICARE

## 2022-10-07 DIAGNOSIS — M25.531 RIGHT WRIST PAIN: Primary | ICD-10-CM

## 2022-10-07 PROCEDURE — 73130 X-RAY EXAM OF HAND: CPT

## 2022-10-07 PROCEDURE — 73110 X-RAY EXAM OF WRIST: CPT

## 2022-10-07 PROCEDURE — 99283 EMERGENCY DEPT VISIT LOW MDM: CPT

## 2022-10-08 VITALS
RESPIRATION RATE: 16 BRPM | BODY MASS INDEX: 23.43 KG/M2 | SYSTOLIC BLOOD PRESSURE: 150 MMHG | TEMPERATURE: 98.3 F | HEART RATE: 72 BPM | OXYGEN SATURATION: 96 % | DIASTOLIC BLOOD PRESSURE: 51 MMHG | HEIGHT: 65 IN | WEIGHT: 140.65 LBS

## 2022-10-08 PROCEDURE — 74011250637 HC RX REV CODE- 250/637: Performed by: EMERGENCY MEDICINE

## 2022-10-08 RX ORDER — CARVEDILOL 6.25 MG/1
12.5 TABLET ORAL
Status: COMPLETED | OUTPATIENT
Start: 2022-10-08 | End: 2022-10-08

## 2022-10-08 RX ORDER — AMLODIPINE BESYLATE 5 MG/1
5 TABLET ORAL
Status: COMPLETED | OUTPATIENT
Start: 2022-10-08 | End: 2022-10-08

## 2022-10-08 RX ADMIN — AMLODIPINE BESYLATE 5 MG: 5 TABLET ORAL at 06:41

## 2022-10-08 RX ADMIN — CARVEDILOL 12.5 MG: 6.25 TABLET, FILM COATED ORAL at 06:41

## 2022-10-08 NOTE — ED NOTES
Verbal shift change report given to Frannie Gar RN (oncoming nurse). Report included the following information SBAR, MAR, and Recent Results. Pt still awaiting transportation.

## 2022-10-08 NOTE — ED PROVIDER NOTES
27-year-old female with history of atrial fibrillation, CKD, COPD presents with a chief complaint of right wrist pain. Patient apparently complained of wrist pain earlier in the day at her facility. There is no report of trauma. Additional history and review of systems is limited by age and likely dementia. Past Medical History:   Diagnosis Date    A-fib ([facility]     Anemia     Bilateral cataracts     Bronchitis     CKD (chronic kidney disease)     COPD (chronic obstructive pulmonary disease) (HCC)     Edema     GERD (gastroesophageal reflux disease)     HTN (hypertension)     Hypercholesterolemia     Hyperlipidemia     Insomnia     Major depressive disorder     Stuttering     Vitamin deficiency        Past Surgical History:   Procedure Laterality Date    HX CAROTID ENDARTERECTOMY      HX HYSTERECTOMY      HX TONSILLECTOMY           History reviewed. No pertinent family history.     Social History     Socioeconomic History    Marital status:      Spouse name: Not on file    Number of children: Not on file    Years of education: Not on file    Highest education level: Not on file   Occupational History    Not on file   Tobacco Use    Smoking status: Never    Smokeless tobacco: Never   Substance and Sexual Activity    Alcohol use: Never    Drug use: Never    Sexual activity: Not on file   Other Topics Concern    Not on file   Social History Narrative    Not on file     Social Determinants of Health     Financial Resource Strain: Not on file   Food Insecurity: Not on file   Transportation Needs: Not on file   Physical Activity: Not on file   Stress: Not on file   Social Connections: Not on file   Intimate Partner Violence: Not on file   Housing Stability: Not on file         ALLERGIES: Keflex [cephalexin]    Review of Systems   Unable to perform ROS: Age     Vitals:    10/07/22 2049 10/07/22 2100 10/07/22 2115   BP: (!) 191/169 (!) 158/115 (!) 148/56   Pulse: 67     Resp: 16     Temp: 98.3 °F (36.8 °C) SpO2: 96% 94% 97%   Weight: 63.8 kg (140 lb 10.5 oz)     Height: 5' 5\" (1.651 m)              Physical Exam  Vitals and nursing note reviewed. Constitutional:       General: She is not in acute distress. Appearance: Normal appearance. She is not ill-appearing, toxic-appearing or diaphoretic. HENT:      Head: Normocephalic and atraumatic. Eyes:      Extraocular Movements: Extraocular movements intact. Cardiovascular:      Rate and Rhythm: Normal rate. Pulses: Normal pulses. Pulmonary:      Effort: Pulmonary effort is normal. No respiratory distress. Abdominal:      General: There is no distension. Musculoskeletal:         General: Normal range of motion. Cervical back: Normal range of motion. Comments: Mild swelling of the right wrist.   Skin:     General: Skin is dry. Neurological:      Mental Status: She is alert and oriented to person, place, and time. Psychiatric:         Mood and Affect: Mood normal.        MDM  Number of Diagnoses or Management Options  Right wrist pain  Diagnosis management comments:   Plain film x-rays that show no fracture. Will discharge patient back to her facility.            Procedures

## 2022-10-08 NOTE — ED NOTES
Report given to Yoanna Cain at [address] [facility] [address]. Discharge instructions provided. Awaiting transportation.

## 2022-10-08 NOTE — ED NOTES
Pt's BP noted to be elevated, Dr. Stratton Files notified. After looking through patient's senior living med list, amlodipine and carvedilol were ordered. I crushed meds, put them in apple sauce. Pt took meds and ate the whole apple sauce. Water provided. Pt cleaned, diaper changed. Pt now laying on her side, resting comfortably. Awaiting transportation back to facility.

## 2022-10-08 NOTE — DISCHARGE INSTRUCTIONS
Thank you for allowing us to provide you with medical care today. We realize that you have many choices for your emergency care needs. We thank you for choosing [facility]. Please choose us in the future for any continued health care needs. The exam and treatment you received in the Emergency Department were for an emergent problem and are not intended as complete care. It is important that you follow up with a doctor, nurse practitioner, or physician's assistant for ongoing care. If your symptoms worsen or you do not improve as expected and you are unable to reach your usual health care provider, you should return to the Emergency Department. We are available 24 hours a day. Please make an appointment with your health care provider(s) for follow up of your Emergency Department visit. Take this sheet with you when you go to your follow-up visit.

## 2022-10-08 NOTE — ED NOTES
Hospital to home at the bedside to transport patient to [address] [facility] [address]

## 2022-10-08 NOTE — ED TRIAGE NOTES
PT sent to the ED from [address] [place] [address] for complaints of right wrist pain and swelling. No known injury. Area tender to palpation. EMS report BS of 407 and a systolic bp over 355. Reported Hx of confusion. Patient not oriented to person, place, time or situation.

## 2023-01-18 ENCOUNTER — HOSPITAL ENCOUNTER (EMERGENCY)
Age: 88
Discharge: HOME | End: 2023-01-18
Attending: EMERGENCY MEDICINE
Payer: MEDICARE

## 2023-01-18 ENCOUNTER — APPOINTMENT (OUTPATIENT)
Dept: GENERAL RADIOLOGY | Age: 88
End: 2023-01-18
Attending: PHYSICIAN ASSISTANT
Payer: MEDICARE

## 2023-01-18 VITALS
WEIGHT: 125 LBS | DIASTOLIC BLOOD PRESSURE: 57 MMHG | BODY MASS INDEX: 20.8 KG/M2 | RESPIRATION RATE: 16 BRPM | HEART RATE: 87 BPM | SYSTOLIC BLOOD PRESSURE: 171 MMHG | TEMPERATURE: 97.6 F | OXYGEN SATURATION: 94 %

## 2023-01-18 DIAGNOSIS — M25.562 ARTHRALGIA OF LEFT LOWER LEG: Primary | ICD-10-CM

## 2023-01-18 PROCEDURE — 72170 X-RAY EXAM OF PELVIS: CPT

## 2023-01-18 PROCEDURE — 99283 EMERGENCY DEPT VISIT LOW MDM: CPT

## 2023-01-18 PROCEDURE — 73551 X-RAY EXAM OF FEMUR 1: CPT

## 2023-01-18 NOTE — ED TRIAGE NOTES
80year old female pt comes to the ED via EMS for a CC Left leg pain. EMS states that pt comes from the [facility] and has been complaining of left leg pain since this morning. Pt has a history of CVA and was told this is her baseline.  Pt is A&Ox1

## 2023-01-18 NOTE — ED PROVIDER NOTES
Chey Dooley is a 80 y.o. female  who presents by EMS to ER with c/o Patient presents with:  Leg Pain  Patient comes from SNF with left leg pain. Patient history of CVA at baseline per EMS. No reported fall. Patient is poor historian. She specifically denies any fevers, chills, nausea, vomiting, chest pain, shortness of breath, headache, rash, diarrhea, abdominal pain, urinary/bowel changes, sweating or weight loss. PCP: Hazel Putnam MD   PMHx significant for: Past Medical History:  No date: A-fib [facility])  No date: Anemia  No date: Bilateral cataracts  No date: Bronchitis  No date: CKD (chronic kidney disease)  No date: COPD (chronic obstructive pulmonary disease) (HCC)  No date: Edema  No date: GERD (gastroesophageal reflux disease)  No date: HTN (hypertension)  No date: Hypercholesterolemia  No date: Hyperlipidemia  No date: Insomnia  No date: Major depressive disorder  No date: Stuttering  No date: Vitamin deficiency   PSHx significant for: Past Surgical History:  No date: HX CAROTID ENDARTERECTOMY  No date: HX HYSTERECTOMY  No date: HX TONSILLECTOMY  Social Hx: Tobacco use: Social History    Tobacco Use      Smoking status: Never      Smokeless tobacco: Never  ; EtOH use: The patient states she drinks 0 per week.; Illicit Drug use: Allergies:   -- Keflex [Cephalexin] -- Hives    There are no other complaints, changes or physical findings at this time.           Past Medical History:   Diagnosis Date    A-fib ([facility] [address]     Anemia     Bilateral cataracts     Bronchitis     CKD (chronic kidney disease)     COPD (chronic obstructive pulmonary disease) (HCC)     Edema     GERD (gastroesophageal reflux disease)     HTN (hypertension)     Hypercholesterolemia     Hyperlipidemia     Insomnia     Major depressive disorder     Stuttering     Vitamin deficiency        Past Surgical History:   Procedure Laterality Date    HX CAROTID ENDARTERECTOMY      HX HYSTERECTOMY      HX TONSILLECTOMY           History reviewed. No pertinent family history. Social History     Socioeconomic History    Marital status:      Spouse name: Not on file    Number of children: Not on file    Years of education: Not on file    Highest education level: Not on file   Occupational History    Not on file   Tobacco Use    Smoking status: Never    Smokeless tobacco: Never   Substance and Sexual Activity    Alcohol use: Never    Drug use: Never    Sexual activity: Not on file   Other Topics Concern    Not on file   Social History Narrative    Not on file     Social Determinants of Health     Financial Resource Strain: Not on file   Food Insecurity: Not on file   Transportation Needs: Not on file   Physical Activity: Not on file   Stress: Not on file   Social Connections: Not on file   Intimate Partner Violence: Not on file   Housing Stability: Not on file         ALLERGIES: Keflex [cephalexin]    Review of Systems   Constitutional:  Negative for activity change, chills and fever. HENT:  Negative for congestion, rhinorrhea and sore throat. Respiratory:  Negative for shortness of breath. Cardiovascular:  Negative for chest pain and leg swelling. Gastrointestinal:  Negative for abdominal pain, diarrhea, nausea and vomiting. Genitourinary:  Negative for dysuria, vaginal bleeding and vaginal discharge. Musculoskeletal:  Positive for arthralgias and myalgias. Neurological:  Negative for dizziness. Psychiatric/Behavioral:  Negative for confusion. All other systems reviewed and are negative. There were no vitals filed for this visit. Physical Exam  Constitutional:       Appearance: Normal appearance. She is well-developed. HENT:      Head: Normocephalic and atraumatic. Right Ear: External ear normal.      Left Ear: External ear normal.      Nose: Nose normal.      Mouth/Throat:      Pharynx: No oropharyngeal exudate. Eyes:      General: Lids are normal.         Right eye: No discharge.          Left eye: No discharge. Conjunctiva/sclera: Conjunctivae normal.   Neck:      Thyroid: No thyromegaly. Trachea: No tracheal deviation. Cardiovascular:      Rate and Rhythm: Normal rate and regular rhythm. Heart sounds: Normal heart sounds. Pulmonary:      Effort: Pulmonary effort is normal.      Breath sounds: Normal breath sounds. Abdominal:      General: Bowel sounds are normal.      Palpations: Abdomen is soft. Musculoskeletal:         General: Normal range of motion. Cervical back: Normal range of motion. Comments: LLE is NVI, no swelling, ecchymosis, deformity, patient laying on left side with hip in flexion, patient rolls to right side with out difficulty. Skin:     General: Skin is warm and dry. Neurological:      Mental Status: She is alert and oriented to person, place, and time. Psychiatric:         Judgment: Judgment normal.        Medical Decision Making  Assesment/Plan- 80 y.o. Patient presents with:  Leg Pain  differential includes: leg pain, muscle strain, contusion. Labs and imaging reviewed with no acute findings. No signs of trauma, no reported falls. Small abrasion to left hip area. Recommend PCP follow up. Patient educated on reasons to return to the ED. Amount and/or Complexity of Data Reviewed  Independent Historian: caregiver and EMS  External Data Reviewed: radiology. Radiology: ordered. Details: no acute findings    Risk  OTC drugs.            Procedures

## 2023-01-19 NOTE — ED NOTES
RN called report to [place] prior to discharge facility. Discharge instructions reviewed. Patient stable upon transport.

## 2023-01-19 NOTE — ED NOTES
Report given to [facility] [address] [facility]. Hospital to home at bedside to take patient back to the facility.

## 2023-01-29 ENCOUNTER — HOSPITAL ENCOUNTER (INPATIENT)
Age: 88
LOS: 8 days | Discharge: NURSING, REHAB OR LONG-TERM CARE | DRG: 698 | End: 2023-02-06
Attending: STUDENT IN AN ORGANIZED HEALTH CARE EDUCATION/TRAINING PROGRAM | Admitting: INTERNAL MEDICINE
Payer: MEDICARE

## 2023-01-29 ENCOUNTER — APPOINTMENT (OUTPATIENT)
Dept: GENERAL RADIOLOGY | Age: 88
DRG: 698 | End: 2023-01-29
Attending: STUDENT IN AN ORGANIZED HEALTH CARE EDUCATION/TRAINING PROGRAM
Payer: MEDICARE

## 2023-01-29 ENCOUNTER — APPOINTMENT (OUTPATIENT)
Dept: CT IMAGING | Age: 88
DRG: 698 | End: 2023-01-29
Attending: STUDENT IN AN ORGANIZED HEALTH CARE EDUCATION/TRAINING PROGRAM
Payer: MEDICARE

## 2023-01-29 DIAGNOSIS — Z74.09 DECREASED FUNCTIONAL MOBILITY AND ENDURANCE: ICD-10-CM

## 2023-01-29 DIAGNOSIS — R53.81 DEBILITY: ICD-10-CM

## 2023-01-29 DIAGNOSIS — I10 PRIMARY HYPERTENSION: ICD-10-CM

## 2023-01-29 DIAGNOSIS — I48.0 PAROXYSMAL ATRIAL FIBRILLATION (HCC): ICD-10-CM

## 2023-01-29 DIAGNOSIS — R41.82 ALTERED MENTAL STATUS, UNSPECIFIED ALTERED MENTAL STATUS TYPE: ICD-10-CM

## 2023-01-29 DIAGNOSIS — N17.9 AKI (ACUTE KIDNEY INJURY) (HCC): ICD-10-CM

## 2023-01-29 DIAGNOSIS — I63.9 CEREBROVASCULAR ACCIDENT (CVA), UNSPECIFIED MECHANISM (HCC): ICD-10-CM

## 2023-01-29 DIAGNOSIS — I50.32 DIASTOLIC CHF, CHRONIC (HCC): ICD-10-CM

## 2023-01-29 DIAGNOSIS — R09.02 HYPOXIA: ICD-10-CM

## 2023-01-29 DIAGNOSIS — I50.9 ACUTE ON CHRONIC CONGESTIVE HEART FAILURE, UNSPECIFIED HEART FAILURE TYPE (HCC): Primary | ICD-10-CM

## 2023-01-29 DIAGNOSIS — Z51.5 PALLIATIVE CARE BY SPECIALIST: ICD-10-CM

## 2023-01-29 LAB
25(OH)D3 SERPL-MCNC: 20.7 NG/ML (ref 30–100)
ALBUMIN SERPL-MCNC: 2 G/DL (ref 3.5–5)
ALBUMIN/GLOB SERPL: 0.5 (ref 1.1–2.2)
ALP SERPL-CCNC: 157 U/L (ref 45–117)
ALT SERPL-CCNC: 19 U/L (ref 12–78)
ANION GAP SERPL CALC-SCNC: 3 MMOL/L (ref 5–15)
APPEARANCE UR: CLEAR
AST SERPL-CCNC: 14 U/L (ref 15–37)
BACTERIA URNS QL MICRO: ABNORMAL /HPF
BASE DEFICIT BLD-SCNC: 1.6 MMOL/L
BASOPHILS # BLD: 0 K/UL (ref 0–0.1)
BASOPHILS NFR BLD: 0 % (ref 0–1)
BILIRUB SERPL-MCNC: 0.3 MG/DL (ref 0.2–1)
BILIRUB UR QL: NEGATIVE
BNP SERPL-MCNC: ABNORMAL PG/ML
BUN SERPL-MCNC: 44 MG/DL (ref 6–20)
BUN/CREAT SERPL: 12 (ref 12–20)
CALCIUM SERPL-MCNC: 8.1 MG/DL (ref 8.5–10.1)
CALCULATED T AXIS, ECG11: 5 DEGREES
CHLORIDE SERPL-SCNC: 110 MMOL/L (ref 97–108)
CO2 SERPL-SCNC: 24 MMOL/L (ref 21–32)
COLOR UR: ABNORMAL
COMMENT, HOLDF: NORMAL
COVID-19 RAPID TEST, COVR: NOT DETECTED
CREAT SERPL-MCNC: 3.61 MG/DL (ref 0.55–1.02)
CREAT UR-MCNC: 33.9 MG/DL
DIAGNOSIS, 93000: NORMAL
DIFFERENTIAL METHOD BLD: ABNORMAL
EOSINOPHIL # BLD: 0.2 K/UL (ref 0–0.4)
EOSINOPHIL NFR BLD: 2 % (ref 0–7)
EPITH CASTS URNS QL MICRO: ABNORMAL /LPF
ERYTHROCYTE [DISTWIDTH] IN BLOOD BY AUTOMATED COUNT: 12.4 % (ref 11.5–14.5)
FERRITIN SERPL-MCNC: 175 NG/ML (ref 26–388)
FLUAV AG NPH QL IA: NEGATIVE
FLUBV AG NOSE QL IA: NEGATIVE
FOLATE SERPL-MCNC: 88.9 NG/ML (ref 5–21)
GLOBULIN SER CALC-MCNC: 4.1 G/DL (ref 2–4)
GLUCOSE SERPL-MCNC: 99 MG/DL (ref 65–100)
GLUCOSE UR STRIP.AUTO-MCNC: 100 MG/DL
HCO3 BLD-SCNC: 24.5 MMOL/L (ref 22–26)
HCT VFR BLD AUTO: 29.3 % (ref 35–47)
HGB BLD-MCNC: 8.9 G/DL (ref 11.5–16)
HGB UR QL STRIP: ABNORMAL
IMM GRANULOCYTES # BLD AUTO: 0 K/UL (ref 0–0.04)
IMM GRANULOCYTES NFR BLD AUTO: 0 % (ref 0–0.5)
INR PPP: 1.1 (ref 0.9–1.1)
IRON SATN MFR SERPL: 17 % (ref 20–50)
IRON SERPL-MCNC: 29 UG/DL (ref 35–150)
KETONES UR QL STRIP.AUTO: NEGATIVE MG/DL
LACTATE BLD-SCNC: 0.52 MMOL/L (ref 0.4–2)
LEUKOCYTE ESTERASE UR QL STRIP.AUTO: NEGATIVE
LYMPHOCYTES # BLD: 1.7 K/UL (ref 0.8–3.5)
LYMPHOCYTES NFR BLD: 18 % (ref 12–49)
MAGNESIUM SERPL-MCNC: 2 MG/DL (ref 1.6–2.4)
MCH RBC QN AUTO: 28.8 PG (ref 26–34)
MCHC RBC AUTO-ENTMCNC: 30.4 G/DL (ref 30–36.5)
MCV RBC AUTO: 94.8 FL (ref 80–99)
MONOCYTES # BLD: 0.9 K/UL (ref 0–1)
MONOCYTES NFR BLD: 9 % (ref 5–13)
NEUTS SEG # BLD: 6.6 K/UL (ref 1.8–8)
NEUTS SEG NFR BLD: 71 % (ref 32–75)
NITRITE UR QL STRIP.AUTO: NEGATIVE
NRBC # BLD: 0 K/UL (ref 0–0.01)
NRBC BLD-RTO: 0 PER 100 WBC
PCO2 BLD: 46.6 MMHG (ref 35–45)
PH BLD: 7.33 (ref 7.35–7.45)
PH UR STRIP: 7 (ref 5–8)
PHOSPHATE SERPL-MCNC: 4.7 MG/DL (ref 2.6–4.7)
PLATELET # BLD AUTO: 369 K/UL (ref 150–400)
PMV BLD AUTO: 9.6 FL (ref 8.9–12.9)
PO2 BLD: 33 MMHG (ref 80–100)
POTASSIUM SERPL-SCNC: 5 MMOL/L (ref 3.5–5.1)
PROT SERPL-MCNC: 6.1 G/DL (ref 6.4–8.2)
PROT UR STRIP-MCNC: 300 MG/DL
PROT UR-MCNC: 441 MG/DL (ref 0–11.9)
PROT/CREAT UR-RTO: 13
PROTHROMBIN TIME: 11.8 SEC (ref 9–11.1)
Q-T INTERVAL, ECG07: 396 MS
QRS DURATION, ECG06: 68 MS
QTC CALCULATION (BEZET), ECG08: 424 MS
RBC # BLD AUTO: 3.09 M/UL (ref 3.8–5.2)
RBC #/AREA URNS HPF: ABNORMAL /HPF (ref 0–5)
RETICS # AUTO: 0.06 M/UL (ref 0.02–0.08)
RETICS/RBC NFR AUTO: 1.9 % (ref 0.7–2.1)
SAMPLES BEING HELD,HOLD: NORMAL
SAO2 % BLD: 57.9 % (ref 92–97)
SERVICE CMNT-IMP: ABNORMAL
SODIUM SERPL-SCNC: 137 MMOL/L (ref 136–145)
SOURCE, COVRS: NORMAL
SP GR UR REFRACTOMETRY: 1.01 (ref 1–1.03)
SPECIMEN TYPE: ABNORMAL
TIBC SERPL-MCNC: 171 UG/DL (ref 250–450)
TROPONIN-HIGH SENSITIVITY: 20 NG/L (ref 0–51)
TSH SERPL DL<=0.05 MIU/L-ACNC: 2.61 UIU/ML (ref 0.36–3.74)
UR CULT HOLD, URHOLD: NORMAL
UROBILINOGEN UR QL STRIP.AUTO: 0.2 EU/DL (ref 0.2–1)
VENTRICULAR RATE, ECG03: 69 BPM
VIT B12 SERPL-MCNC: 1536 PG/ML (ref 193–986)
WBC # BLD AUTO: 9.3 K/UL (ref 3.6–11)
WBC URNS QL MICRO: ABNORMAL /HPF (ref 0–4)

## 2023-01-29 PROCEDURE — 82607 VITAMIN B-12: CPT

## 2023-01-29 PROCEDURE — 83735 ASSAY OF MAGNESIUM: CPT

## 2023-01-29 PROCEDURE — 65270000046 HC RM TELEMETRY

## 2023-01-29 PROCEDURE — 85025 COMPLETE CBC W/AUTO DIFF WBC: CPT

## 2023-01-29 PROCEDURE — 93005 ELECTROCARDIOGRAM TRACING: CPT

## 2023-01-29 PROCEDURE — 84156 ASSAY OF PROTEIN URINE: CPT

## 2023-01-29 PROCEDURE — 82746 ASSAY OF FOLIC ACID SERUM: CPT

## 2023-01-29 PROCEDURE — 74011250636 HC RX REV CODE- 250/636: Performed by: INTERNAL MEDICINE

## 2023-01-29 PROCEDURE — 87635 SARS-COV-2 COVID-19 AMP PRB: CPT

## 2023-01-29 PROCEDURE — 71045 X-RAY EXAM CHEST 1 VIEW: CPT

## 2023-01-29 PROCEDURE — P9047 ALBUMIN (HUMAN), 25%, 50ML: HCPCS | Performed by: INTERNAL MEDICINE

## 2023-01-29 PROCEDURE — 70450 CT HEAD/BRAIN W/O DYE: CPT

## 2023-01-29 PROCEDURE — 82652 VIT D 1 25-DIHYDROXY: CPT

## 2023-01-29 PROCEDURE — 83605 ASSAY OF LACTIC ACID: CPT

## 2023-01-29 PROCEDURE — 82728 ASSAY OF FERRITIN: CPT

## 2023-01-29 PROCEDURE — 74011250636 HC RX REV CODE- 250/636: Performed by: STUDENT IN AN ORGANIZED HEALTH CARE EDUCATION/TRAINING PROGRAM

## 2023-01-29 PROCEDURE — 80053 COMPREHEN METABOLIC PANEL: CPT

## 2023-01-29 PROCEDURE — 99285 EMERGENCY DEPT VISIT HI MDM: CPT

## 2023-01-29 PROCEDURE — 82306 VITAMIN D 25 HYDROXY: CPT

## 2023-01-29 PROCEDURE — 87804 INFLUENZA ASSAY W/OPTIC: CPT

## 2023-01-29 PROCEDURE — 83540 ASSAY OF IRON: CPT

## 2023-01-29 PROCEDURE — 81001 URINALYSIS AUTO W/SCOPE: CPT

## 2023-01-29 PROCEDURE — 85610 PROTHROMBIN TIME: CPT

## 2023-01-29 PROCEDURE — 83880 ASSAY OF NATRIURETIC PEPTIDE: CPT

## 2023-01-29 PROCEDURE — 87040 BLOOD CULTURE FOR BACTERIA: CPT

## 2023-01-29 PROCEDURE — 84100 ASSAY OF PHOSPHORUS: CPT

## 2023-01-29 PROCEDURE — 74011000250 HC RX REV CODE- 250: Performed by: INTERNAL MEDICINE

## 2023-01-29 PROCEDURE — 36415 COLL VENOUS BLD VENIPUNCTURE: CPT

## 2023-01-29 PROCEDURE — 85045 AUTOMATED RETICULOCYTE COUNT: CPT

## 2023-01-29 PROCEDURE — 84443 ASSAY THYROID STIM HORMONE: CPT

## 2023-01-29 PROCEDURE — 84484 ASSAY OF TROPONIN QUANT: CPT

## 2023-01-29 RX ORDER — CARVEDILOL 12.5 MG/1
12.5 TABLET ORAL 2 TIMES DAILY WITH MEALS
Status: DISCONTINUED | OUTPATIENT
Start: 2023-01-30 | End: 2023-01-31

## 2023-01-29 RX ORDER — ALBUMIN HUMAN 250 G/1000ML
12.5 SOLUTION INTRAVENOUS EVERY 6 HOURS
Status: COMPLETED | OUTPATIENT
Start: 2023-01-29 | End: 2023-01-30

## 2023-01-29 RX ORDER — ATORVASTATIN CALCIUM 20 MG/1
80 TABLET, FILM COATED ORAL
Status: DISCONTINUED | OUTPATIENT
Start: 2023-01-29 | End: 2023-02-06 | Stop reason: HOSPADM

## 2023-01-29 RX ORDER — POLYETHYLENE GLYCOL 3350 17 G/17G
17 POWDER, FOR SOLUTION ORAL DAILY PRN
Status: DISCONTINUED | OUTPATIENT
Start: 2023-01-29 | End: 2023-02-06 | Stop reason: HOSPADM

## 2023-01-29 RX ORDER — ACETAMINOPHEN 650 MG/1
650 SUPPOSITORY RECTAL
Status: DISCONTINUED | OUTPATIENT
Start: 2023-01-29 | End: 2023-02-06 | Stop reason: HOSPADM

## 2023-01-29 RX ORDER — VENLAFAXINE HYDROCHLORIDE 37.5 MG/1
75 CAPSULE, EXTENDED RELEASE ORAL DAILY
Status: DISCONTINUED | OUTPATIENT
Start: 2023-01-30 | End: 2023-02-06 | Stop reason: HOSPADM

## 2023-01-29 RX ORDER — FUROSEMIDE 10 MG/ML
80 INJECTION INTRAMUSCULAR; INTRAVENOUS 2 TIMES DAILY
Status: DISCONTINUED | OUTPATIENT
Start: 2023-01-29 | End: 2023-02-02

## 2023-01-29 RX ORDER — ACETAMINOPHEN 325 MG/1
650 TABLET ORAL
Status: DISCONTINUED | OUTPATIENT
Start: 2023-01-29 | End: 2023-02-06 | Stop reason: HOSPADM

## 2023-01-29 RX ORDER — ONDANSETRON 2 MG/ML
4 INJECTION INTRAMUSCULAR; INTRAVENOUS
Status: DISCONTINUED | OUTPATIENT
Start: 2023-01-29 | End: 2023-02-06 | Stop reason: HOSPADM

## 2023-01-29 RX ORDER — LABETALOL HYDROCHLORIDE 5 MG/ML
10 INJECTION, SOLUTION INTRAVENOUS
Status: DISCONTINUED | OUTPATIENT
Start: 2023-01-29 | End: 2023-02-06 | Stop reason: HOSPADM

## 2023-01-29 RX ORDER — FOLIC ACID 1 MG/1
1 TABLET ORAL DAILY
Status: DISCONTINUED | OUTPATIENT
Start: 2023-01-30 | End: 2023-02-06 | Stop reason: HOSPADM

## 2023-01-29 RX ORDER — FUROSEMIDE 10 MG/ML
80 INJECTION INTRAMUSCULAR; INTRAVENOUS ONCE
Status: COMPLETED | OUTPATIENT
Start: 2023-01-29 | End: 2023-01-29

## 2023-01-29 RX ORDER — AMLODIPINE BESYLATE 5 MG/1
5 TABLET ORAL DAILY
Status: DISCONTINUED | OUTPATIENT
Start: 2023-01-30 | End: 2023-01-31

## 2023-01-29 RX ORDER — ONDANSETRON 4 MG/1
4 TABLET, ORALLY DISINTEGRATING ORAL
Status: DISCONTINUED | OUTPATIENT
Start: 2023-01-29 | End: 2023-02-06 | Stop reason: HOSPADM

## 2023-01-29 RX ORDER — ESCITALOPRAM OXALATE 10 MG/1
10 TABLET ORAL DAILY
Status: DISCONTINUED | OUTPATIENT
Start: 2023-01-30 | End: 2023-02-06 | Stop reason: HOSPADM

## 2023-01-29 RX ORDER — SODIUM CHLORIDE 0.9 % (FLUSH) 0.9 %
5-40 SYRINGE (ML) INJECTION EVERY 8 HOURS
Status: DISCONTINUED | OUTPATIENT
Start: 2023-01-29 | End: 2023-02-06 | Stop reason: HOSPADM

## 2023-01-29 RX ORDER — SODIUM CHLORIDE 0.9 % (FLUSH) 0.9 %
5-40 SYRINGE (ML) INJECTION AS NEEDED
Status: DISCONTINUED | OUTPATIENT
Start: 2023-01-29 | End: 2023-02-06 | Stop reason: HOSPADM

## 2023-01-29 RX ADMIN — FUROSEMIDE 80 MG: 10 INJECTION, SOLUTION INTRAMUSCULAR; INTRAVENOUS at 16:20

## 2023-01-29 RX ADMIN — ALBUMIN (HUMAN) 12.5 G: 0.25 INJECTION, SOLUTION INTRAVENOUS at 19:16

## 2023-01-29 RX ADMIN — FUROSEMIDE 80 MG: 10 INJECTION, SOLUTION INTRAMUSCULAR; INTRAVENOUS at 21:16

## 2023-01-29 RX ADMIN — SODIUM CHLORIDE, PRESERVATIVE FREE 10 ML: 5 INJECTION INTRAVENOUS at 21:16

## 2023-01-29 NOTE — PROGRESS NOTES
1845: Report received from [facility], [address] [place] [address]. This RN Ulisses Wesley) assumed care of patient at this time.

## 2023-01-29 NOTE — ED PROVIDER NOTES
HPI     History limited secondary to altered mental status    Date of Service:  1/29/2023    Patient:  Luisa Jack    Chief Complaint:  CHF (Reports Edema/)       HPI:  Luisa Jack is a 80 y.o.  female with a past medical history of hypertension, hyperlipidemia, atrial fibrillation on Eliquis, CKD, reported CHF on torsemide who presents for evaluation of edema. Patient presenting from [address] [id number] [facility], there is report of increasing edema to her face and body over the last several days. They are concerned she is having a CHF exacerbation. Patient is a poor historian, baseline mental status unclear. She is denying any chest pain or shortness of breath. Past Medical History:   Diagnosis Date    A-fib ([facility] [address]     Anemia     Bilateral cataracts     Bronchitis     CKD (chronic kidney disease)     COPD (chronic obstructive pulmonary disease) (HCC)     Edema     GERD (gastroesophageal reflux disease)     HTN (hypertension)     Hypercholesterolemia     Hyperlipidemia     Insomnia     Major depressive disorder     Stuttering     Vitamin deficiency        Past Surgical History:   Procedure Laterality Date    HX CAROTID ENDARTERECTOMY      HX HYSTERECTOMY      HX TONSILLECTOMY           No family history on file.     Social History     Socioeconomic History    Marital status:      Spouse name: Not on file    Number of children: Not on file    Years of education: Not on file    Highest education level: Not on file   Occupational History    Not on file   Tobacco Use    Smoking status: Never    Smokeless tobacco: Never   Substance and Sexual Activity    Alcohol use: Never    Drug use: Never    Sexual activity: Not on file   Other Topics Concern    Not on file   Social History Narrative    Not on file     Social Determinants of Health     Financial Resource Strain: Not on file   Food Insecurity: Not on file   Transportation Needs: Not on file   Physical Activity: Not on file   Stress: Not on file   Social Connections: Not on file   Intimate Partner Violence: Not on file   Housing Stability: Not on file         ALLERGIES: Keflex [cephalexin]    Review of Systems   Unable to perform ROS: Mental status change     Vitals:    23 1334   BP: (!) 171/75   Pulse: 63   Resp: 17   SpO2: 90%   Weight: 57 kg (125 lb 10.6 oz)   Height: 5' 5\" (1.651 m)            Physical Exam  Vitals and nursing note reviewed. Constitutional:       General: She is in acute distress. Appearance: Normal appearance. She is not toxic-appearing. HENT:      Head: Normocephalic and atraumatic. Eyes:      Extraocular Movements: Extraocular movements intact. Conjunctiva/sclera: Conjunctivae normal.      Comments: Right periorbital edema   Cardiovascular:      Rate and Rhythm: Normal rate. Pulses: Normal pulses. Pulmonary:      Effort: Pulmonary effort is normal. No respiratory distress. Abdominal:      General: Abdomen is flat. Palpations: Abdomen is soft. Tenderness: There is no abdominal tenderness. Musculoskeletal:         General: Normal range of motion. Cervical back: Normal range of motion. Right lower le+ Edema present. Left lower le+ Edema present. Comments: +anasarca    Skin:     General: Skin is warm and dry. Capillary Refill: Capillary refill takes less than 2 seconds. Neurological:      General: No focal deficit present. Mental Status: She is alert. Motor: Weakness present. Comments: Answers questions with head nods, unable to assess orientation. Generally weak   Psychiatric:         Mood and Affect: Mood normal.         Behavior: Behavior normal.        Medical Decision Making      DECISION MAKING:  Rudy Barnhart is a 80 y.o. female who comes in as above. Low-grade temperature 37.9 °C on arrival. Vital signs also notable for elevated blood pressure at 171/75, and pulse oximetry 90% on room air.   Subsequently placed on supplemental oxygen with improvement. Patient is a poor historian mostly answers questions with head nods. On examination she does have anasarca and some dependent edema of the right face. No other signs concerning for allergic reaction. Diminished breath sounds at the bilateral bases. On review of her medications, she is normally on torsemide 100 mg daily. She will be evaluated for suspected CHF exacerbation with labs including BNP and a chest x-ray. There is also report the patient has had increased altered mental status, she will be evaluated CT imaging of the head to rule out acute process. CBC does show anemia with hemoglobin of 8.9. Electrolytes are stable. BUN and creatinine are elevated at 44 and 3.61, this is increased from labs when compared to. BNP is significantly elevated at 11,315. High-sensitivity troponin within normal limits. Lactic acid is not elevated. Influenza  A and B, COVID-19 negative. CT of the head shows a small age-indeterminate left cerebral infarct, new compared to April 2021, otherwise no acute findings. Chest x-ray does show pulmonary edema and small right pleural effusion. Given patient's elevated BNP and chest x-ray findings, will diurese. Patient will be admitted to the hospital for further treatment and work-up of above. I discussed patient's case with the hospitalist, Dr. Keena Gasca, for [facility].       Perfect Serve Consult for Admission  3:38 PM    ED Room Number: SF33/07  Patient Name and age:  Adelina Spicer 80 y.o.  female  Working Diagnosis: Acute on chronic congestive heart failure, unspecified heart failure type ([address]  (primary encounter diagnosis)  JIM (acute kidney injury) ([facility] [address]  Hypoxia    COVID-19 Suspicion:  no  Sepsis present:  no  Reassessment needed: no  Code Status:  Full Code  Readmission: no  Isolation Requirements:  no  Recommended Level of Care:  telemetry  Department:[facility] Adult ED - 21   Other:  80 y.o.  female with a past medical history of hypertension, hyperlipidemia, atrial fibrillation on Eliquis, CKD, reported CHF on torsemide who presents for evaluation of edema. On exam there is dependent edema to the face with right periorbital edema and anasarca. Laboratory work is notable for a BUN of 44 and creatinine 3.61, baseline creatinine from 2021 appears to be 1.7. BNP is significantly elevated at 1315. Troponin within normal limits. COVID-19 and influenza negative. Chest x-ray does show pulmonary edema and a small right pleural effusion. IV Lasix ordered for diuresis. Amount and/or Complexity of Data Reviewed  Labs: ordered. Radiology: ordered. ECG/medicine tests: ordered. Decision-making details documented in ED Course. Risk  Prescription drug management. Decision regarding hospitalization. ED Course as of 01/29/23 1536   Sun Jan 29, 2023   1344 EKG 12 LEAD INITIAL  ECG at 1331, interpreted by me: Atrial fibrillation, rate 69 bpm.  Normal axis. Normal intervals. No ST elevations. [SH]      ED Course User Index  [SH] Rosibel Mendoza DO       Procedures         IMAGING:  CT HEAD WO CONT   Final Result   1. Small age indeterminate left cerebellar infarct, new since April 2021.   2. No intracranial hemorrhage. 3. Periventricular white matter disease is likely secondary to chronic small   vessel ischemic changes. 4. Left supraorbital scalp soft tissue swelling. XR CHEST PORT   Final Result      Mild CHF pattern of pulmonary edema. Small right pleural effusion. Recommend   followup PA and lateral chest views in 8-10 weeks to ensure resolution.             MRI BRAIN WO CONT    (Results Pending)         Medications During Visit:  Medications   sodium chloride (NS) flush 5-40 mL (10 mL IntraVENous Given 1/30/23 0729)   sodium chloride (NS) flush 5-40 mL (has no administration in time range)   acetaminophen (TYLENOL) tablet 650 mg (has no administration in time range)     Or   acetaminophen (TYLENOL) suppository 650 mg (has no administration in time range)   polyethylene glycol (MIRALAX) packet 17 g (has no administration in time range)   ondansetron (ZOFRAN ODT) tablet 4 mg (has no administration in time range)     Or   ondansetron (ZOFRAN) injection 4 mg (has no administration in time range)   furosemide (LASIX) injection 80 mg (80 mg IntraVENous Given 1/30/23 0854)   amLODIPine (NORVASC) tablet 5 mg ( Oral Canceled Entry 1/30/23 0845)   atorvastatin (LIPITOR) tablet 80 mg (0 mg Oral Held 1/29/23 2200)   carvediloL (COREG) tablet 12.5 mg ( Oral Automatically Held 2/13/23 1700)   apixaban (ELIQUIS) tablet 2.5 mg ( Oral Canceled Entry 1/30/23 0845)   escitalopram oxalate (LEXAPRO) tablet 10 mg ( Oral Canceled Entry 4/77/85 1465)   folic acid (FOLVITE) tablet 1 mg ( Oral Canceled Entry 1/30/23 0847)   venlafaxine-SR (EFFEXOR-XR) capsule 75 mg (75 mg Oral Given 1/30/23 0845)   labetaloL (NORMODYNE;TRANDATE) injection 10 mg (10 mg IntraVENous Given 1/30/23 0334)   metoprolol (LOPRESSOR) injection 2.5 mg (has no administration in time range)   furosemide (LASIX) injection 80 mg (80 mg IntraVENous Given 1/29/23 1620)   albumin human 25% (BUMINATE) solution 12.5 g (0 g IntraVENous IV Completed 1/30/23 1008)         IMPRESSION:  1. Acute on chronic congestive heart failure, unspecified heart failure type ([address]    2. JIM (acute kidney injury) ([address]    3.  Hypoxia        DISPOSITION:  Admitted    Alma Fernandez DO

## 2023-01-29 NOTE — ED TRIAGE NOTES
Pt arrived via EMS from [facility] with CC increased BUE and facial edema, lethargy and AMS. Pt examined by physician Friday, but believed she could wait. This morning edema and lethargy increased. PMH: A fib, on 2.5 Eliquis.

## 2023-01-29 NOTE — H&P
[address] [facility] Adult  Hospitalist Group  History and Physical    Date of Service:  1/29/2023  Primary Care Provider: Shaun Rider MD  Source of information: Chart review and Spouse/family member    Chief Complaint: CHF (Reports Edema/) and Altered mental status      History of Presenting Illness:   Allen Crisostomo is a 80 y.o. female from [address] [facility] [address] [facility] with h/o HTN, afib on Eliquis, CKD, CHF on torsemide, and mostly nonverbal per daughter who p/w increasing generalized edema over the last several days, also lethargy and AMS. Per daughter, she speaks minimally \"she just doesn't want to speak\" (possibly because of a stutter), she sleeps [place] lot,\" doesn't eat/drink much and doesn't like being told what to do. Per daughter, she developed edema about a week ago, but she looked better on Thursday. She had developed facial edema initially which was felt to be d/t her sleeping most of the day with her head down in her wheelchair. No known falls. She has been wheelchair-bound for over a year. The daughter says the facility takes good care of her. Pt in NAD. Denies pain. REVIEW OF SYSTEMS:  UTO 2/2 AMS    Past Medical History:   Diagnosis Date    A-fib ([facility] [address]     Anemia     Bilateral cataracts     Bronchitis     CKD (chronic kidney disease)     COPD (chronic obstructive pulmonary disease) (HCC)     Edema     GERD (gastroesophageal reflux disease)     HTN (hypertension)     Hypercholesterolemia     Hyperlipidemia     Insomnia     Major depressive disorder     Stuttering     Vitamin deficiency       Past Surgical History:   Procedure Laterality Date    HX CAROTID ENDARTERECTOMY      HX HYSTERECTOMY      HX TONSILLECTOMY       Prior to Admission medications    Medication Sig Start Date End Date Taking? Authorizing Provider   potassium chloride (KLOR-CON) 10 mEq tablet Take 10 mEq by mouth daily.  9/22/21  Yes Provider, Historical   acetaminophen (TYLENOL) 325 mg tablet Take 650 mg by mouth every six (6) hours as needed for Pain. Yes Provider, Historical   albuterol-ipratropium (DUO-NEB) 2.5 mg-0.5 mg/3 ml nebu 3 mL by Nebulization route two (2) times a day. Yes Provider, Historical   torsemide (DEMADEX) 100 mg tablet Take 50 mg by mouth daily. Yes Provider, Historical   amLODIPine (NORVASC) 5 mg tablet Take 1 Tablet by mouth daily. 6/29/21  Yes Fred Hernandez MD   cyanocobalamin 1,000 mcg tablet Take 1 Tab by mouth daily. 4/28/21  Yes Abi Young, DO   atorvastatin (LIPITOR) 80 mg tablet Take 1 Tab by mouth nightly. 4/27/21  Yes Abi Young, DO   carvediloL (COREG) 12.5 mg tablet Take 1 Tab by mouth two (2) times daily (with meals). 3/23/21  Yes Fred Hernandez MD   escitalopram oxalate (LEXAPRO) 10 mg tablet Take 10 mg by mouth daily. 1/19/21  Yes Provider, Historical   cetirizine (ZYRTEC) 10 mg tablet Take 10 mg by mouth two (2) times a day. 6/3/20  Yes Provider, Historical   folic acid (FOLVITE) 1 mg tablet Take 1 mg by mouth daily. Yes Provider, Historical   Eliquis 2.5 mg tablet TAKE 1 TABLET BY MOUTH TWICE A DAY 7/21/20  Yes Fred Hernandez MD   venlafaxine-SR [facility]...) 75 mg capsule Take 75 mg by mouth daily. Yes Provider, Historical     Allergies   Allergen Reactions    Keflex [Cephalexin] Hives      No family history on file. Social History:  reports that she has never smoked. She has never used smokeless tobacco. She reports that she does not drink alcohol and does not use drugs. Family and social history were personally reviewed, all pertinent and relevant details are outlined as above.     Objective:   Visit Vitals  BP (!) 179/65 (BP 1 Location: Left upper arm, BP Patient Position: At rest;Semi fowlers)   Pulse 79   Temp 97.4 °F (36.3 °C)   Resp 23   Ht 5' 5\" (1.651 m)   Wt 57 kg (125 lb 10.6 oz)   SpO2 97%   BMI 20.91 kg/m²    O2 Flow Rate (L/min): 1.5 l/min O2 Device: Nasal cannula    PHYSICAL EXAM:   General:          Awake, lying in the fetal position in NAD  HEENT: Generalized facial edema and periorbital edema, conjunctivae clear, no scleral icterus  Lungs:             Clear to auscultation bilaterally, symmetric expansion, no respiratory distress  Heart:              Regular rate and rhythm  Abdomen:        Soft, non-tender, non-distended  Extremities:     1+ edema b/l LE, 3+ edema b/l UE, no cyanosis  Skin:                Warm, dry, turgor normal, no rashes or lesions  Neurologic:      Awake, alert, minimally verbal, answers some questions, states name and . Sensory grossly within normal limits. GORDON    Data Review: All diagnostic labs and studies have been reviewed. Abnormal Labs Reviewed   CBC WITH AUTOMATED DIFF - Abnormal; Notable for the following components:       Result Value    RBC 3.09 (*)     HGB 8.9 (*)     HCT 29.3 (*)     All other components within normal limits   METABOLIC PANEL, COMPREHENSIVE - Abnormal; Notable for the following components:    Chloride 110 (*)     Anion gap 3 (*)     BUN 44 (*)     Creatinine 3.61 (*)     eGFR 11 (*)     Calcium 8.1 (*)     AST (SGOT) 14 (*)     Alk.  phosphatase 157 (*)     Protein, total 6.1 (*)     Albumin 2.0 (*)     Globulin 4.1 (*)     A-G Ratio 0.5 (*)     All other components within normal limits   NT-PRO BNP - Abnormal; Notable for the following components:    NT pro-BNP 11,315 (*)     All other components within normal limits   VITAMIN B12 - Abnormal; Notable for the following components:    Vitamin B12 1,536 (*)     All other components within normal limits   VITAMIN D, 25 HYDROXY - Abnormal; Notable for the following components:    Vitamin D 25-Hydroxy 20.7 (*)     All other components within normal limits   IRON PROFILE - Abnormal; Notable for the following components:    Iron 29 (*)     TIBC 171 (*)     Iron % saturation 17 (*)     All other components within normal limits   BLOOD GAS,LACTIC ACID, POC - Abnormal; Notable for the following components:    pH (POC) 7.33 (*)     pCO2 (POC) 46.6 (*)     pO2 (POC) 33 (*)     sO2 (POC) 57.9 (*)     All other components within normal limits       All Micro Results       Procedure Component Value Units Date/Time    URINE CULTURE HOLD SAMPLE [[id number]] Collected: 01/29/23 1851    Order Status: Completed Specimen: Urine Updated: 01/29/23 1856     Urine culture hold       Urine on hold in Microbiology dept for 2 days. If unpreserved urine is submitted, it cannot be used for addtional testing after 24 hours, recollection will be required. CULTURE, BLOOD, PAIRED [[id number]] Collected: 01/29/23 1349    Order Status: Sent Specimen: Blood Updated: 01/29/23 1700    COVID-19 RAPID TEST [[id number]] Collected: 01/29/23 1404    Order Status: Completed Specimen: Nasopharyngeal Updated: 01/29/23 1429     Specimen source Nasopharyngeal        COVID-19 rapid test Not detected        Comment: Rapid Abbott ID Now       Rapid NAAT:  The specimen is NEGATIVE for SARS-CoV-2, the novel coronavirus associated with COVID-19. Negative results should be treated as presumptive and, if inconsistent with clinical signs and symptoms or necessary for patient management, should be tested with an alternative molecular assay. Negative results do not preclude SARS-CoV-2 infection and should not be used as the sole basis for patient management decisions. This test has been authorized by the FDA under an Emergency Use Authorization (EUA) for use by authorized laboratories. Fact sheet for Healthcare Providers:  http://www.raheel.brigette/  Fact sheet for Patients: http://www.johnathon-luna.brigette/       Methodology: Isothermal Nucleic Acid Amplification                 IMAGING:   CT HEAD WO CONT   Final Result   1. Small age indeterminate left cerebellar infarct, new since April 2021.   2. No intracranial hemorrhage. 3. Periventricular white matter disease is likely secondary to chronic small   vessel ischemic changes.    4. Left supraorbital scalp soft tissue swelling. XR CHEST PORT   Final Result      Mild CHF pattern of pulmonary edema. Small right pleural effusion. Recommend   followup PA and lateral chest views in 8-10 weeks to ensure resolution. ECG/ECHO:    Results for orders placed or performed during the hospital encounter of 01/29/23   EKG, 12 LEAD, INITIAL   Result Value Ref Range    Ventricular Rate 69 BPM    QRS Duration 68 ms    Q-T Interval 396 ms    QTC Calculation (Bezet) 424 ms    Calculated T Axis 5 degrees    Diagnosis       ** Poor data quality, interpretation may be adversely affected  Baseline artifact  Likely NSR with PACs  Confirmed by Trung Koch M.D., Lety Venegas ([id number]) on 1/29/2023 3:31:48 PM         EKG was personally reviewed with independent interpretation of sinus arrhythmia    CXR was personally reviewed with independent interpretation of  mild pulm edema    External records were reviewed    Assessment:   Given the patient's current clinical presentation, there is a high level of concern for decompensation if discharged from the emergency department. Complex decision making was performed, which includes reviewing the patient's available past medical records, laboratory results, and imaging studies.     Active Problems:    CHF exacerbation ([facility] [address] (1/29/2023)      JIM (acute kidney injury) ([facility] [address] (1/29/2023)    Plan:     Anasarca  Likely d/t renal failure, poss nephrotic syndrome  Worse on left side likely dependent d/t lying on left side  Worse in upper extremities poss d/t lying in fetal position with arms crossed  Cont diuresis bid for now, add IV albumin x3 doses for now  TSH wnl    JIM on CKD4 vs progressive CKD  Unclear baseline Cr since no recent records; last Cr was 1.97 on 8/11/21  Check urine pr/cr ratio  Monitor Cr with diuresis  Consult nephrology    HFpEF  Last echo 4/2021 EF 60-65% with mild DD  On torsemide at home  Cont IV lasix as above  Mild pulm edema on CXR     Reported acute metabolic encephalopathy vs baseline mental status  Likely 2/2 kidney failure  At baseline pt is minimally verbal  CT head with small age indeterminate left cerebellar infarct, left scalp ST swelling  PT/OT  B12 wnl    HTN  Uncontrolled likely d/t hypervolemia  Cont home norvasc and coreg    H/o Afib, currently NSR  Cont home Eliquis  No PFO on echo 4/2021    Low grade temp 100.2  Cont to monitor for SIRS  No evidence of infection at this time    Anemia, likely chronic disease  No s/sx of bleeding  Iron studies are c/w chronic disease    Depression  Cont home SSRI, SNRI    HLD  Cont home statin    Chronic illnesses impacting care:  as above    Reviewed current home medications and will make no changes    Care is affected by social determinants of health:  lives at LTC      DIET: ADULT DIET Easy to Chew; Low Sodium (2 gm); Low Potassium (Less than 3000 mg/day); Low Phosphorus (Less than 1000 mg)   ISOLATION PRECAUTIONS: There are currently no Active Isolations  CODE STATUS: Full Code per LTC records and daughter  DVT PROPHYLAXIS: Eliquis  EARLY MOBILITY ASSESSMENT: Recommend an assessment from physical therapy and/or occupational therapy  Care Plan discussed with: Patient/Family and Nurse  Anticipated Disposition:  back to LTC  Anticipated Discharge: Greater than 48 hours    CRITICAL CARE WAS PERFORMED FOR THIS ENCOUNTER: YES. I had a face to face encounter with the patient, reviewed and interpreted patient data including clinical events, labs, images, vital signs, I/O's, and examined patient. I have discussed the case and the plan and management of the patient's care with the consulting services, the bedside nurses and necessary ancillary providers. This patient has a high probability of imminent, clinically significant deterioration, which requires the highest level of preparedness to intervene urgently.  I participated in the decision-making and personally managed or directed the management of the following life and organ supporting interventions that required my frequent assessment to treat or prevent imminent deterioration. I personally spent 40 minutes of critical care time. This is time spent at this critically ill patient's bedside actively involved in patient care as well as the coordination of care and discussions with the patient's family. This does not include any procedural time which has been billed separately.     Signed By: Fouzia Davison MD     January 29, 2023

## 2023-01-30 ENCOUNTER — APPOINTMENT (OUTPATIENT)
Dept: NON INVASIVE DIAGNOSTICS | Age: 88
DRG: 698 | End: 2023-01-30
Attending: STUDENT IN AN ORGANIZED HEALTH CARE EDUCATION/TRAINING PROGRAM
Payer: MEDICARE

## 2023-01-30 ENCOUNTER — APPOINTMENT (OUTPATIENT)
Dept: ULTRASOUND IMAGING | Age: 88
DRG: 698 | End: 2023-01-30
Attending: STUDENT IN AN ORGANIZED HEALTH CARE EDUCATION/TRAINING PROGRAM
Payer: MEDICARE

## 2023-01-30 ENCOUNTER — APPOINTMENT (OUTPATIENT)
Dept: MRI IMAGING | Age: 88
DRG: 698 | End: 2023-01-30
Attending: STUDENT IN AN ORGANIZED HEALTH CARE EDUCATION/TRAINING PROGRAM
Payer: MEDICARE

## 2023-01-30 PROBLEM — R90.89 ABNORMAL CT OF BRAIN: Status: ACTIVE | Noted: 2023-01-30

## 2023-01-30 PROBLEM — Z71.89 GOALS OF CARE, COUNSELING/DISCUSSION: Status: ACTIVE | Noted: 2023-01-30

## 2023-01-30 PROBLEM — I50.32 DIASTOLIC CHF, CHRONIC (HCC): Status: ACTIVE | Noted: 2023-01-30

## 2023-01-30 PROBLEM — Z51.5 PALLIATIVE CARE BY SPECIALIST: Status: ACTIVE | Noted: 2023-01-30

## 2023-01-30 PROBLEM — Z74.09 DECREASED FUNCTIONAL MOBILITY AND ENDURANCE: Status: ACTIVE | Noted: 2023-01-30

## 2023-01-30 LAB
ALBUMIN SERPL-MCNC: 2.1 G/DL (ref 3.5–5)
ANION GAP SERPL CALC-SCNC: 8 MMOL/L (ref 5–15)
BASOPHILS # BLD: 0.1 K/UL (ref 0–0.1)
BASOPHILS NFR BLD: 1 % (ref 0–1)
BUN SERPL-MCNC: 44 MG/DL (ref 6–20)
BUN/CREAT SERPL: 13 (ref 12–20)
CALCIUM SERPL-MCNC: 8.3 MG/DL (ref 8.5–10.1)
CHLORIDE SERPL-SCNC: 111 MMOL/L (ref 97–108)
CO2 SERPL-SCNC: 21 MMOL/L (ref 21–32)
CREAT SERPL-MCNC: 3.51 MG/DL (ref 0.55–1.02)
DIFFERENTIAL METHOD BLD: ABNORMAL
ECHO PULMONARY ARTERY SYSTOLIC PRESSURE (PASP): 40 MMHG
EOSINOPHIL # BLD: 0.1 K/UL (ref 0–0.4)
EOSINOPHIL NFR BLD: 1 % (ref 0–7)
ERYTHROCYTE [DISTWIDTH] IN BLOOD BY AUTOMATED COUNT: 12.2 % (ref 11.5–14.5)
GLUCOSE BLD STRIP.AUTO-MCNC: 87 MG/DL (ref 65–117)
GLUCOSE SERPL-MCNC: 90 MG/DL (ref 65–100)
HCT VFR BLD AUTO: 23.9 % (ref 35–47)
HEMOCCULT STL QL: NEGATIVE
HGB BLD-MCNC: 7.5 G/DL (ref 11.5–16)
IMM GRANULOCYTES # BLD AUTO: 0.1 K/UL (ref 0–0.04)
IMM GRANULOCYTES NFR BLD AUTO: 0 % (ref 0–0.5)
LYMPHOCYTES # BLD: 1.2 K/UL (ref 0.8–3.5)
LYMPHOCYTES NFR BLD: 9 % (ref 12–49)
MCH RBC QN AUTO: 29.6 PG (ref 26–34)
MCHC RBC AUTO-ENTMCNC: 31.4 G/DL (ref 30–36.5)
MCV RBC AUTO: 94.5 FL (ref 80–99)
MONOCYTES # BLD: 0.8 K/UL (ref 0–1)
MONOCYTES NFR BLD: 7 % (ref 5–13)
NEUTS SEG # BLD: 10.5 K/UL (ref 1.8–8)
NEUTS SEG NFR BLD: 82 % (ref 32–75)
NRBC # BLD: 0 K/UL (ref 0–0.01)
NRBC BLD-RTO: 0 PER 100 WBC
PHOSPHATE SERPL-MCNC: 4.8 MG/DL (ref 2.6–4.7)
PLATELET # BLD AUTO: 279 K/UL (ref 150–400)
PMV BLD AUTO: 9.9 FL (ref 8.9–12.9)
POTASSIUM SERPL-SCNC: 4.3 MMOL/L (ref 3.5–5.1)
PROCALCITONIN SERPL-MCNC: 0.05 NG/ML
RBC # BLD AUTO: 2.53 M/UL (ref 3.8–5.2)
SERVICE CMNT-IMP: NORMAL
SODIUM SERPL-SCNC: 140 MMOL/L (ref 136–145)
WBC # BLD AUTO: 12.7 K/UL (ref 3.6–11)

## 2023-01-30 PROCEDURE — 65270000046 HC RM TELEMETRY

## 2023-01-30 PROCEDURE — 76770 US EXAM ABDO BACK WALL COMP: CPT

## 2023-01-30 PROCEDURE — 70551 MRI BRAIN STEM W/O DYE: CPT

## 2023-01-30 PROCEDURE — 97165 OT EVAL LOW COMPLEX 30 MIN: CPT | Performed by: OCCUPATIONAL THERAPIST

## 2023-01-30 PROCEDURE — 82962 GLUCOSE BLOOD TEST: CPT

## 2023-01-30 PROCEDURE — 99222 1ST HOSP IP/OBS MODERATE 55: CPT | Performed by: PSYCHIATRY & NEUROLOGY

## 2023-01-30 PROCEDURE — 99223 1ST HOSP IP/OBS HIGH 75: CPT | Performed by: INTERNAL MEDICINE

## 2023-01-30 PROCEDURE — 74011000250 HC RX REV CODE- 250: Performed by: INTERNAL MEDICINE

## 2023-01-30 PROCEDURE — 85025 COMPLETE CBC W/AUTO DIFF WBC: CPT

## 2023-01-30 PROCEDURE — 36415 COLL VENOUS BLD VENIPUNCTURE: CPT

## 2023-01-30 PROCEDURE — 93306 TTE W/DOPPLER COMPLETE: CPT | Performed by: INTERNAL MEDICINE

## 2023-01-30 PROCEDURE — 80069 RENAL FUNCTION PANEL: CPT

## 2023-01-30 PROCEDURE — 84145 PROCALCITONIN (PCT): CPT

## 2023-01-30 PROCEDURE — 74011250636 HC RX REV CODE- 250/636: Performed by: INTERNAL MEDICINE

## 2023-01-30 PROCEDURE — 74011000250 HC RX REV CODE- 250: Performed by: NURSE PRACTITIONER

## 2023-01-30 PROCEDURE — P9047 ALBUMIN (HUMAN), 25%, 50ML: HCPCS | Performed by: INTERNAL MEDICINE

## 2023-01-30 PROCEDURE — 99222 1ST HOSP IP/OBS MODERATE 55: CPT | Performed by: NURSE PRACTITIONER

## 2023-01-30 PROCEDURE — 92610 EVALUATE SWALLOWING FUNCTION: CPT

## 2023-01-30 PROCEDURE — 93306 TTE W/DOPPLER COMPLETE: CPT

## 2023-01-30 PROCEDURE — 82272 OCCULT BLD FECES 1-3 TESTS: CPT

## 2023-01-30 PROCEDURE — 97535 SELF CARE MNGMENT TRAINING: CPT | Performed by: OCCUPATIONAL THERAPIST

## 2023-01-30 PROCEDURE — 74011250637 HC RX REV CODE- 250/637: Performed by: INTERNAL MEDICINE

## 2023-01-30 RX ORDER — METOPROLOL TARTRATE 5 MG/5ML
2.5 INJECTION INTRAVENOUS EVERY 6 HOURS
Status: DISCONTINUED | OUTPATIENT
Start: 2023-01-30 | End: 2023-01-31

## 2023-01-30 RX ADMIN — ALBUMIN (HUMAN) 12.5 G: 0.25 INJECTION, SOLUTION INTRAVENOUS at 07:28

## 2023-01-30 RX ADMIN — SODIUM CHLORIDE, PRESERVATIVE FREE 10 ML: 5 INJECTION INTRAVENOUS at 22:52

## 2023-01-30 RX ADMIN — SODIUM CHLORIDE, PRESERVATIVE FREE 10 ML: 5 INJECTION INTRAVENOUS at 13:20

## 2023-01-30 RX ADMIN — LABETALOL HYDROCHLORIDE 10 MG: 5 INJECTION INTRAVENOUS at 03:34

## 2023-01-30 RX ADMIN — METOPROLOL TARTRATE 2.5 MG: 1 INJECTION, SOLUTION INTRAVENOUS at 11:18

## 2023-01-30 RX ADMIN — METOPROLOL TARTRATE 2.5 MG: 1 INJECTION, SOLUTION INTRAVENOUS at 23:36

## 2023-01-30 RX ADMIN — SODIUM CHLORIDE, PRESERVATIVE FREE 10 ML: 5 INJECTION INTRAVENOUS at 07:29

## 2023-01-30 RX ADMIN — METOPROLOL TARTRATE 2.5 MG: 1 INJECTION, SOLUTION INTRAVENOUS at 17:35

## 2023-01-30 RX ADMIN — ALBUMIN (HUMAN) 12.5 G: 0.25 INJECTION, SOLUTION INTRAVENOUS at 02:41

## 2023-01-30 RX ADMIN — FUROSEMIDE 80 MG: 10 INJECTION, SOLUTION INTRAMUSCULAR; INTRAVENOUS at 08:54

## 2023-01-30 RX ADMIN — ATORVASTATIN CALCIUM 80 MG: 20 TABLET, FILM COATED ORAL at 22:50

## 2023-01-30 RX ADMIN — VENLAFAXINE HYDROCHLORIDE 75 MG: 37.5 CAPSULE, EXTENDED RELEASE ORAL at 08:45

## 2023-01-30 NOTE — ED NOTES
Hospitalist aware of pts status at told to keep pt NPO at this time.  Doctor also aware unable to measure I and Os at this time

## 2023-01-30 NOTE — CONSULTS
Palliative Medicine Consult  Andrés: [phone] ([id number])    Patient Name: Sixto Castro  YOB: 1932    Date of Initial Consult: 2023  Reason for Consult: care decisions  Requesting Provider: Smita Francisco    Primary Care Physician: Carlos Horner MD     SUMMARY:   Sixto Castro is a 80 y.o. presented on 2023 from [facility] due to edema- generalized and facial, impaired cognition. Admitted with a diagnosis of anasarca (possibly nephrotic syndrome), JIM on CKD 4 vs progressive CKD, HFpEF- mild diastolic dysfunction- EF 02-45% in 8356, acute metabolic encephalopathy (? Due to renal status) vs baseline mental status, anemia, temp 100.2    Consults:   Renal, neurology and cardiology     Further ED workup: Age indeterminate left cerebellar infarct on CT, no new infarct noted on MRI. Repeat echo- EF 55-60% with ? Mild to mod AS. PMH:  HTN, hyperlipidemia, afib (Eliquis), CKD 4, HFpEF, COPD, GERD, depressive disorder (Lexapro and Effexor), carotid endarterectomy, dementia (no formal workup)    Current medical issues leading to Palliative Medicine involvement include: care decisions. Social:  patient has lived at [address] [facility] [address] assisted living since 2021. Prior to this, patient lived with her daughter. Requires help with ADLs and requires to be fed, wc dependent x 1+ years, mostly non-verbal.   Enjoys watching TV-[employer] channel. 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 Second  is - 5-6 years ago. Binu Taylor (daughter)   Has another daughter (lives in [place]) but Criss Pena reports she is not involved in her mother's care. Criss Pena reports her mother has signed an AMD in the past at another hospital. She does not have a copy at this time.  Criss Pena reports she is the primary decision maker      PALLIATIVE DIAGNOSES:   Palliative care encounter  HFpEF- 55-60%   JIM on CKD 4, possible nephrotic syndrome  Requires assistance for all ADLs  Wheelchair bound   Reluctance to speak due to stuttering   Goals of care conversation     PLAN:   Case reviewed with Dr Tian Mendez and chart reviewed. Met patient in the ED. Patient was alert and did answer a few simple questions. Called and spoke to the daughter, Beto Hicks via phone. Riaz Whitney reports her mother has been essentially non-verbal for the past year. The patient stutters when she speaks and the daughter feels this is why she chooses not to talk. Patient has been wheelchair bound for over a year. Patient requires assistance with all ADL, including need to be fed. Daughter reports her mother often falls asleep during meals. Patient is less social than she was in the past.    Patient has hx diastolic HF- Repeat echo today- 55-60%. Cardiology noted patient not volume overloaded on CXR or on exam.   Hx CKD 4 with current creatinine 3.61. Nephrology consult pending. MRI done today as age indeterminate infarct on CT and not new infarct noted on MRI. Neurology consult pending. Will meet with patient and her daughter tomorrow together to discuss code status and goals of care.      Initial consult note routed to primary continuity provider and/or primary health care team members  Communicated plan of care with: Palliative [facility] 192 Team     GOALS OF CARE / TREATMENT PREFERENCES:     GOALS OF CARE:   waiting on neurology and renal consults, will discuss with patient and dtr on 1/31       TREATMENT PREFERENCES:   Code Status: Full Code  (at this time) will discuss further on 1/31    Patient and family's personal goals include:     Important upcoming milestones or family events: none reported     The patient identifies the following as important for living well:        Advance Care Planning:  [] The Pall Med Interdisciplinary Team has updated the ACP Navigator with Health Care Decision Maker and Patient Capacity      Advance Care Planning 2/27/2020   Patient's Healthcare Decision Maker is: Verbal statement (Legal Next of Kin remains as decision maker)   Confirm Advance Directive Yes, not on file         Daughter, Alec Figueredo, reports she is the primary decision maker       Other:    As far as possible, the palliative care team has discussed with patient / health care proxy about goals of care / treatment preferences for patient.      HISTORY:     History obtained from: chart, team, patient and dtr     CHIEF COMPLAINT: Pt voiced no complaints at this time     HPI/SUBJECTIVE:    The patient is:   [x] Verbal and participatory (patient did speak a few words)  [] Non-participatory due to: medical condition        Clinical Pain Assessment (nonverbal scale for severity on nonverbal patients):              Duration: for how long has pt been experiencing pain (e.g., 2 days, 1 month, years)  Frequency: how often pain is an issue (e.g., several times per day, once every few days, constant)     FUNCTIONAL ASSESSMENT:     Palliative Performance Scale (PPS):          PSYCHOSOCIAL/SPIRITUAL SCREENING:     Palliative IDT has assessed this patient for cultural preferences / practices and a referral made as appropriate to needs (Cultural Services, Patient Advocacy, Ethics, etc.)    Any spiritual / Latter-day concerns:  [] Yes /  [x] No  [] Unable to obtain this information  If \"Yes\" to discuss with pastoral care during IDT     Does caregiver feel burdened by caring for their loved one:   [] Yes /  [x] No /  [] No Caregiver Present/Available [] No Caregiver [] Pt Lives at [place] [address]  If \"Yes\" to discuss with social work during IDT    Anticipatory grief assessment:   [x] Normal  / [] Maladaptive   [] Unable to obtain this information  [] n/a  If \"Maladaptive\" to discuss with social work during IDT    ESAS Anxiety:      ESAS Depression:          REVIEW OF SYSTEMS:     Positive and pertinent negative findings in ROS are noted above in HPI. The following systems were [x] reviewed / [] unable to be reviewed as noted in HPI  Other findings are noted below. Systems: constitutional, ears/nose/mouth/throat, respiratory, gastrointestinal, genitourinary, musculoskeletal, integumentary, neurologic, psychiatric, endocrine. Positive findings noted below. Modified ESAS Completed by: provider                                   Stool Occurrence(s): 0        PHYSICAL EXAM:     From RN flowsheet:  Wt Readings from Last 3 Encounters:   01/30/23 125 lb (56.7 kg)   01/18/23 125 lb (56.7 kg)   10/07/22 140 lb 10.5 oz (63.8 kg)     Blood pressure (!) 160/72, pulse 93, temperature 98.9 °F (37.2 °C), resp. rate 12, height 5' 5\" (1.651 m), weight 125 lb (56.7 kg), SpO2 95 %. Pain Scale 1: Numeric (0 - 10)  Pain Intensity 1: 0  Pain Onset 1: unknown  Pain Location 1: Knee  Pain Orientation 1: Left  Pain Description 1: Aching  Pain Intervention(s) 1: Medication (see MAR)  Last bowel movement, if known: unknown    Constitutional: elderly female, resting on the stretcher in the ED, alert, patient is able to answer simple questions. Able to tell me her name, her daughter's name.  Denies pain at this time   Eyes: pupils equal, anicteric, no marked facial edema   ENMT: no nasal discharge, moist mucous membranes  Cardiovascular:    afib   Respiratory: breathing not labored, symmetric, 2 LPM, sat 98%    Gastrointestinal: soft non-tender, +bowel sounds  :  purewik in place   Musculoskeletal: no deformity, no tenderness to palpation  Skin: warm, dry  Neurologic: following commands, moving all extremities  Psychiatric: no hallucinations  Other:       HISTORY:     Active Problems:    CHF exacerbation ([facility] [address] (1/29/2023)      JIM (acute kidney injury) ([facility] [address] (1/29/2023)    Past Medical History:   Diagnosis Date    A-fib (HCC)     Anemia     Bilateral cataracts Bronchitis     CKD (chronic kidney disease)     COPD (chronic obstructive pulmonary disease) (HCC)     Edema     GERD (gastroesophageal reflux disease)     HTN (hypertension)     Hypercholesterolemia     Hyperlipidemia     Insomnia     Major depressive disorder     Stuttering     Vitamin deficiency       Past Surgical History:   Procedure Laterality Date    HX CAROTID ENDARTERECTOMY      HX HYSTERECTOMY      HX TONSILLECTOMY        No family history on file. History reviewed, no pertinent family history.   Social History     Tobacco Use    Smoking status: Never    Smokeless tobacco: Never   Substance Use Topics    Alcohol use: Never     Allergies   Allergen Reactions    Keflex [Cephalexin] Hives      Current Facility-Administered Medications   Medication Dose Route Frequency    metoprolol (LOPRESSOR) injection 2.5 mg  2.5 mg IntraVENous Q6H    sodium chloride (NS) flush 5-40 mL  5-40 mL IntraVENous Q8H    sodium chloride (NS) flush 5-40 mL  5-40 mL IntraVENous PRN    acetaminophen (TYLENOL) tablet 650 mg  650 mg Oral Q6H PRN    Or    acetaminophen (TYLENOL) suppository 650 mg  650 mg Rectal Q6H PRN    polyethylene glycol (MIRALAX) packet 17 g  17 g Oral DAILY PRN    ondansetron (ZOFRAN ODT) tablet 4 mg  4 mg Oral Q6H PRN    Or    ondansetron (ZOFRAN) injection 4 mg  4 mg IntraVENous Q6H PRN    [Held by provider] furosemide (LASIX) injection 80 mg  80 mg IntraVENous BID    amLODIPine (NORVASC) tablet 5 mg  5 mg Oral DAILY    atorvastatin (LIPITOR) tablet 80 mg  80 mg Oral QHS    [Held by provider] carvediloL (COREG) tablet 12.5 mg  12.5 mg Oral BID WITH MEALS    apixaban (ELIQUIS) tablet 2.5 mg  2.5 mg Oral BID    escitalopram oxalate (LEXAPRO) tablet 10 mg  10 mg Oral DAILY    folic acid (FOLVITE) tablet 1 mg  1 mg Oral DAILY    venlafaxine-SR (EFFEXOR-XR) capsule 75 mg  75 mg Oral DAILY    labetaloL (NORMODYNE;TRANDATE) injection 10 mg  10 mg IntraVENous Q2H PRN     Current Outpatient Medications   Medication Sig potassium chloride (KLOR-CON) 10 mEq tablet Take 10 mEq by mouth daily. acetaminophen (TYLENOL) 325 mg tablet Take 650 mg by mouth every six (6) hours as needed for Pain. albuterol-ipratropium (DUO-NEB) 2.5 mg-0.5 mg/3 ml nebu 3 mL by Nebulization route two (2) times a day. torsemide (DEMADEX) 100 mg tablet Take 50 mg by mouth daily. amLODIPine (NORVASC) 5 mg tablet Take 1 Tablet by mouth daily. cyanocobalamin 1,000 mcg tablet Take 1 Tab by mouth daily. atorvastatin (LIPITOR) 80 mg tablet Take 1 Tab by mouth nightly. carvediloL (COREG) 12.5 mg tablet Take 1 Tab by mouth two (2) times daily (with meals). escitalopram oxalate (LEXAPRO) 10 mg tablet Take 10 mg by mouth daily. cetirizine (ZYRTEC) 10 mg tablet Take 10 mg by mouth two (2) times a day. folic acid (FOLVITE) 1 mg tablet Take 1 mg by mouth daily. Eliquis 2.5 mg tablet TAKE 1 TABLET BY MOUTH TWICE A DAY    venlafaxine-SR (EFFEXOR-XR) 75 mg capsule Take 75 mg by mouth daily. LAB AND IMAGING FINDINGS:     Lab Results   Component Value Date/Time    WBC 12.7 (H) 01/30/2023 07:08 AM    HGB 7.5 (L) 01/30/2023 07:08 AM    PLATELET 702 84/42/0182 07:08 AM     Lab Results   Component Value Date/Time    Sodium 140 01/30/2023 07:08 AM    Potassium 4.3 01/30/2023 07:08 AM    Chloride 111 (H) 01/30/2023 07:08 AM    CO2 21 01/30/2023 07:08 AM    BUN 44 (H) 01/30/2023 07:08 AM    Creatinine 3.51 (H) 01/30/2023 07:08 AM    Calcium 8.3 (L) 01/30/2023 07:08 AM    Magnesium 2.0 01/29/2023 01:49 PM    Phosphorus 4.8 (H) 01/30/2023 07:08 AM      Lab Results   Component Value Date/Time    Alk.  phosphatase 157 (H) 01/29/2023 01:49 PM    Protein, total 6.1 (L) 01/29/2023 01:49 PM    Albumin 2.1 (L) 01/30/2023 07:08 AM    Globulin 4.1 (H) 01/29/2023 01:49 PM     Lab Results   Component Value Date/Time    INR 1.1 01/29/2023 01:49 PM    Prothrombin time 11.8 (H) 01/29/2023 01:49 PM      Lab Results   Component Value Date/Time    Iron 29 (L) 01/29/2023 01:49 PM    TIBC 171 (L) 01/29/2023 01:49 PM    Iron % saturation 17 (L) 01/29/2023 01:49 PM    Ferritin 175 01/29/2023 01:49 PM      No results found for: PH, PCO2, PO2  No components found for: GLPOC   No results found for: CPK, CKMB             Total time:  minutes  Counseling / coordination time, spent as noted above:   minutes  > 50% counseling / coordination?: yes    Prolonged service was provided for  []30 min   []75 min in face to face time in the presence of the patient, spent as noted above. Time Start:   Time End:   Note: this can only be billed with 10411 (initial) or 32920 (follow up). If multiple start / stop times, list each separately.

## 2023-01-30 NOTE — PROGRESS NOTES
Informed that patient is unable to follow commands for successful dysphagia screening, holding PO meds including eliquis, will look for source of anemia, put patient on SCDs, and order subq heparin or lovenox depending on anemia workup.    -occult, coags, ferritin, ret count pending  -SLP consulted  -no change from when patient arrived

## 2023-01-30 NOTE — CONSULTS
SUBJECTIVE  Sood Pink is a 80 y.o. female admitted for anasarca. Has a history of hypertension, atrial fibrillation on Eliquis, CKD, congestive heart failure. Mostly nonverbal at baseline and she does have a stutter. Wheelchair dependent for ambulation. Due to fluctuating mentation, CT scan was done which showed an age-indeterminate left cerebellar infarct    EXAM  Exam:  Visit Vitals  BP (!) 186/88   Pulse 93   Temp 98.9 °F (37.2 °C)   Resp 12   Ht 5' 5\" (1.651 m)   Wt 125 lb (56.7 kg)   SpO2 95%   BMI 20.80 kg/m²     Gen: Alert  CV: RRR  Lungs: non labored breathing  Abd: non distending  Neuro:.  Speech slightly difficult to understand but she is able to answer simple question. Could not really tell me where she was but acknowledged that she was in the hospital and at [facility]. CN II-XII: PERRL, EOMI, face symmetric, tongue/palate midline  Motor: strength 5/5 all four ext  Sensory: intact to LT  Gait: Wheelchair-bound at baseline    LABS/ IMAGING  MRI Results (most recent):  Results from Hospital Encounter encounter on 01/29/23    MRI BRAIN WO CONT    Narrative  INDICATION:   infarct on ct    EXAMINATION:  MRI BRAIN WO CONTRAST    COMPARISON:  1/29/2023    TECHNIQUE:  Multiplanar multisequence acquisition without contrast of the brain. FINDINGS:    Ventricles:  Midline, no hydrocephalus. Brain Parenchyma/Brainstem:  Generalized parenchymal loss with extensive chronic  T2 hyperintensity throughout the supratentorial white matter and александр. Small  chronic bilateral cerebellar infarctions. No acute infarction. Intracranial Hemorrhage:  Numerous chronic microhemorrhages throughout the  cerebellum, brainstem, basal ganglia and thalami as well as a few scattered  throughout the periphery of the supratentorial brain. Basal Cisterns:  Normal.  Flow Voids:  Normal.  Additional Comments:  Small round extra-axial focus lateral right posterior  fossa, 8 mm likely meningioma.  Possible small meningioma along the undersurface  of the right tentorium (series 11 image 17). Impression  1. No acute infarction. Extensive chronic white matter disease and small chronic  left cerebellar infarction. 2. Numerous chronic microhemorrhages in nonspecific distribution as above. CT Results (most recent):  Results from Hospital Encounter encounter on 01/29/23    CT HEAD WO CONT    Narrative  Indication:  AMS    Comparison: CT April 2021    Findings: 5 mm axial images were obtained from the skull base through the  vertex. CT dose reduction was achieved through the use of a standardized protocol  tailored for this examination and automatic exposure control for dose  modulation. The ventricles and cortical sulci are prominent, compatible with age related  volume loss. There is no evidence of intracranial hemorrhage, mass, or mass  effect. There is an age-indeterminate small left cerebellar infarct, new since  prior study. There is periventricular white matter disease. No extra-axial fluid  collections are seen. There is sinus mucosal thickening in the alveolar recess  of the right maxillary sinus. There is also involvement of both sphenoid  sinuses. The orbital structures are unremarkable. No osseous abnormalities are  seen. Impression  1. Small age indeterminate left cerebellar infarct, new since April 2021.  2. No intracranial hemorrhage. 3. Periventricular white matter disease is likely secondary to chronic small  vessel ischemic changes. 4. Left supraorbital scalp soft tissue swelling. Carotid Doppler last year did not show any significant stenosis.   Vertebrals patent with antegrade flow      ASSESSMENT  Hospital Problems  Date Reviewed: 10/1/2021            Codes Class Noted POA    Diastolic CHF, chronic ([facility] [address] ICD-10-CM: I50.32  ICD-9-CM: 428.32, 428.0  1/30/2023 Unknown        Decreased functional mobility and endurance ICD-10-CM: Z74.09  ICD-9-CM: 780.99  1/30/2023 Unknown        Abnormal CT of brain ICD-10-CM: R90.89  ICD-9-CM: 793.0  1/30/2023 Unknown        CHF exacerbation ([address] ICD-10-CM: I50.9  ICD-9-CM: 428.0  1/29/2023 Unknown        JIM (acute kidney injury) [facility]) ICD-10-CM: N17.9  ICD-9-CM: 584.9  1/29/2023 Unknown            PLAN  Small age-indeterminate left cerebellar infarct noted on CT which appears chronic on MRI scan  It is possible that this may have been related to cardioembolism given her history  She is currently on oral anticoagulation which she should continue  Also on a statin  No additional work-up indicated as it would not   Please call with questions    Jessica Whitaker MD

## 2023-01-30 NOTE — PROGRESS NOTES
[facility] Adult  Hospitalist Group                                                                                          Hospitalist Progress Note  Danitza Zavala MD  Answering service: [phone] from in house phone        Date of Service:  2023  NAME:  June Andersen  :  1932  MRN:  [id number]       Admission Summary:   HPI: Hollie Ignacio is a 80 y.o. female from [facility] [address] [place] with h/o HTN, afib on Eliquis, CKD, CHF on torsemide, and mostly nonverbal per daughter who p/w increasing generalized edema over the last several days, also lethargy and AMS. Per daughter, she speaks minimally \"she just doesn't want to speak\" (possibly because of a stutter), she sleeps [place] lot,\" doesn't eat/drink much and doesn't like being told what to do. Per daughter, she developed edema about a week ago, but she looked better on Thursday. She had developed facial edema initially which was felt to be d/t her sleeping most of the day with her head down in her wheelchair. No known falls. She has been wheelchair-bound for over a year. The daughter says the facility takes good care of her. Pt in NAD. Denies pain. \"          Interval history / Subjective:   Demented largely nonverbal unable to obtain any ROS from her     Assessment & Plan:     Anasarca  JIM on CKD stage IV  HFpEF exacerbation class II  Acute metabolic encephalopathy on chronic dementia  Hypertension  History of A. fib currently NSR  Chronic cerebellar infarct  Acute on chronic ACD  Depression  Hyperlipidemia  Generalized deconditioning   -Appreciate cardiology, hold IV diuretics clinically improved edema and worsened JIM  - Nephrology consulted awaiting evaluation  -Will order renal ultrasound to rule out hydro  - TTE pending  - CT head showed age-indeterminate infarct, MRI brain consistent with chronic findings  -ua neg, tsh b12 folate wnl   -FOBT is negative, anemia work-up consistent with ACD likely secondary to renal insufficiency  - Continue to monitor CBC, transfuse for hemoglobin less than 7  -passed speech eval  -cont Eliquis, statin Continue metoprolol  -New SSRI/SNRI  - PT OT    Critically ill high risk for decompensation. CCT time 30 minutes    Spoke to patient's daughter updated on plan. Discussed goals of care she says she will think about it and discuss with her family we will regroup tomorrow    Palliative has also been consulted for goals of care. Code status: Full  Prophylaxis: Eliquis  Care Plan discussed with: patient/family, nurse, case management  Anticipated Disposition:   Inpatient  Cardiac monitoring: Telemetry     Hospital Problems  Date Reviewed: 10/1/2021            Codes Class Noted POA    CHF exacerbation ([facility] ICD-10-CM: I50.9  ICD-9-CM: 428.0  1/29/2023 Unknown        JIM (acute kidney injury) ([facility] ICD-10-CM: N17.9  ICD-9-CM: 584.9  1/29/2023 Unknown           Social Determinants of Health     Tobacco Use: Low Risk     Smoking Tobacco Use: Never    Smokeless Tobacco Use: Never    Passive Exposure: Not on file   Alcohol Use: Not on file   Financial Resource Strain: Not on file   Food Insecurity: Not on file   Transportation Needs: Not on file   Physical Activity: Not on file   Stress: Not on file   Social Connections: Not on file   Intimate Partner Violence: Not on file   Depression: Not on file   Housing Stability: Not on file       Review of Systems:   A comprehensive review of systems was negative except for that written in the HPI. Vital Signs:    Last 24hrs VS reviewed since prior progress note.  Most recent are:  Visit Vitals  BP (!) 160/72   Pulse 93   Temp 98.9 °F (37.2 °C)   Resp 12   Ht 5' 5\" (1.651 m)   Wt 56.7 kg (125 lb)   SpO2 95%   BMI 20.80 kg/m²         Intake/Output Summary (Last 24 hours) at 1/30/2023 1553  Last data filed at 1/30/2023 0700  Gross per 24 hour   Intake --   Output 2100 ml   Net -2100 ml        Physical Examination:     I had a face to face encounter with this patient and independently examined them on 1/30/2023 as outlined below:          Constitutional:  No acute distress, cooperative, pleasant, demented   ENT:  Oral mucosa moist, oropharynx benign. Resp:  CTA bilaterally. No wheezing/rhonchi/rales. No accessory muscle use. CV:  Regular rhythm, normal rate, no murmurs, gallops, rubs    GI:  Soft, non distended, non tender. normoactive bowel sounds, no hepatosplenomegaly     Musculoskeletal:  trace edema, warm, 2+ pulses throughout    Neurologic:  Moves all extremities. AAOx0, CN II-XII reviewed            Data Review:    Review and/or order of clinical lab test  Review and/or order of tests in the radiology section of CPT  Review and/or order of tests in the medicine section of CPT    I have independently reviewed and interpreted patient's lab and all other diagnostic data    Labs:     Recent Labs     01/30/23  0708 01/29/23  1349   WBC 12.7* 9.3   HGB 7.5* 8.9*   HCT 23.9* 29.3*    369     Recent Labs     01/30/23  0708 01/29/23  1349    137   K 4.3 5.0   * 110*   CO2 21 24   BUN 44* 44*   CREA 3.51* 3.61*   GLU 90 99   CA 8.3* 8.1*   MG  --  2.0   PHOS 4.8* 4.7     Recent Labs     01/30/23  0708 01/29/23  1349   ALT  --  19   AP  --  157*   TBILI  --  0.3   TP  --  6.1*   ALB 2.1* 2.0*   GLOB  --  4.1*     Recent Labs     01/29/23  1349   INR 1.1   PTP 11.8*      Recent Labs     01/29/23  1349   TIBC 171*   PSAT 17*   FERR 175      Lab Results   Component Value Date/Time    Folate 88.9 (H) 01/29/2023 01:49 PM      No results for input(s): PH, PCO2, PO2 in the last 72 hours. No results for input(s): CPK, CKNDX, TROIQ in the last 72 hours.     No lab exists for component: CPKMB  Lab Results   Component Value Date/Time    Cholesterol, total 298 (H) 04/25/2021 03:01 AM    HDL Cholesterol 38 04/25/2021 03:01 AM    LDL, calculated 211.6 (H) 04/25/2021 03:01 AM    Triglyceride 242 (H) 04/25/2021 03:01 AM    CHOL/HDL Ratio 7.8 (H) 04/25/2021 03:01 AM     No results found for: [facility]  Lab Results   Component Value Date/Time    Color YELLOW/STRAW 01/29/2023 06:51 PM    Appearance CLEAR 01/29/2023 06:51 PM    Specific gravity 1.011 01/29/2023 06:51 PM    Specific gravity 1.020 04/23/2021 03:44 PM    pH (UA) 7.0 01/29/2023 06:51 PM    Protein 300 (A) 01/29/2023 06:51 PM    Glucose 100 (A) 01/29/2023 06:51 PM    Ketone Negative 01/29/2023 06:51 PM    Bilirubin Negative 01/29/2023 06:51 PM    Urobilinogen 0.2 01/29/2023 06:51 PM    Nitrites Negative 01/29/2023 06:51 PM    Leukocyte Esterase Negative 01/29/2023 06:51 PM    Epithelial cells FEW 01/29/2023 06:51 PM    Bacteria 2+ (A) 01/29/2023 06:51 PM    WBC 0-4 01/29/2023 06:51 PM    RBC 0-5 01/29/2023 06:51 PM       Notes reviewed from all clinical/nonclinical/nursing services involved in patient's clinical care. Care coordination discussions were held with appropriate clinical/nonclinical/ nursing providers based on care coordination needs. Patients current active Medications were reviewed, considered, added and adjusted based on the clinical condition today. Home Medications were reconciled to the best of my ability given all available resources at the time of admission. Route is PO if not otherwise noted.       Medications Reviewed:     Current Facility-Administered Medications   Medication Dose Route Frequency    metoprolol (LOPRESSOR) injection 2.5 mg  2.5 mg IntraVENous Q6H    sodium chloride (NS) flush 5-40 mL  5-40 mL IntraVENous Q8H    sodium chloride (NS) flush 5-40 mL  5-40 mL IntraVENous PRN    acetaminophen (TYLENOL) tablet 650 mg  650 mg Oral Q6H PRN    Or    acetaminophen (TYLENOL) suppository 650 mg  650 mg Rectal Q6H PRN    polyethylene glycol (MIRALAX) packet 17 g  17 g Oral DAILY PRN    ondansetron (ZOFRAN ODT) tablet 4 mg  4 mg Oral Q6H PRN    Or    ondansetron (ZOFRAN) injection 4 mg  4 mg IntraVENous Q6H PRN    [Held by provider] furosemide (LASIX) injection 80 mg  80 mg IntraVENous BID    amLODIPine (NORVASC) tablet 5 mg  5 mg Oral DAILY    atorvastatin (LIPITOR) tablet 80 mg  80 mg Oral QHS    [Held by provider] carvediloL (COREG) tablet 12.5 mg  12.5 mg Oral BID WITH MEALS    apixaban (ELIQUIS) tablet 2.5 mg  2.5 mg Oral BID    escitalopram oxalate (LEXAPRO) tablet 10 mg  10 mg Oral DAILY    folic acid (FOLVITE) tablet 1 mg  1 mg Oral DAILY    venlafaxine-SR (EFFEXOR-XR) capsule 75 mg  75 mg Oral DAILY    labetaloL (NORMODYNE;TRANDATE) injection 10 mg  10 mg IntraVENous Q2H PRN     Current Outpatient Medications   Medication Sig    potassium chloride (KLOR-CON) 10 mEq tablet Take 10 mEq by mouth daily. acetaminophen (TYLENOL) 325 mg tablet Take 650 mg by mouth every six (6) hours as needed for Pain. albuterol-ipratropium (DUO-NEB) 2.5 mg-0.5 mg/3 ml nebu 3 mL by Nebulization route two (2) times a day. torsemide (DEMADEX) 100 mg tablet Take 50 mg by mouth daily. amLODIPine (NORVASC) 5 mg tablet Take 1 Tablet by mouth daily. cyanocobalamin 1,000 mcg tablet Take 1 Tab by mouth daily. atorvastatin (LIPITOR) 80 mg tablet Take 1 Tab by mouth nightly. carvediloL (COREG) 12.5 mg tablet Take 1 Tab by mouth two (2) times daily (with meals). escitalopram oxalate (LEXAPRO) 10 mg tablet Take 10 mg by mouth daily. cetirizine (ZYRTEC) 10 mg tablet Take 10 mg by mouth two (2) times a day. folic acid (FOLVITE) 1 mg tablet Take 1 mg by mouth daily. Eliquis 2.5 mg tablet TAKE 1 TABLET BY MOUTH TWICE A DAY    venlafaxine-SR (EFFEXOR-XR) 75 mg capsule Take 75 mg by mouth daily.      ______________________________________________________________________  EXPECTED LENGTH OF STAY: - - -  ACTUAL LENGTH OF STAY:          1                 Riaz Nunez MD

## 2023-01-30 NOTE — PROGRESS NOTES
Transition of Care Plan  RUR 18%    Patient/family seen: Yes  Informed patient/family of BPCI-A Bundle Program. Also advised of potential outreach by Care Transitions Team.  [address] [facility] Beneficiary Letter Delivered to Beneficiary or Representative- was given:to daughter, Cristy Lopez       Disposition  Return to [place]   [phone] [facility] HOSP-CONCORD DIV Room [address]  Contact SAGAR Armstrong or SAGAR assistant Yolis Bustos   Will secure COVID 19 rapid test if required prior to discharge--1/29/23 COVID 19 Negative    Transportation  [facility] phone [phone]     Medical follow up   PCP physician at the [address]  Daughter  Janee Santiago  [phone]    Reason for Admission:   CHF   Hx of A-Fib, hypertension, GERD, COPD, CHF Stuttering                   RUR Score:     18%             PCP: First and Last name:   Marc Pal MD  PCP at the [address] [place] [address]    Name of Practice:    Are you a current patient: Yes/No:    Approximate date of last visit: last week    Can you participate in a virtual visit if needed:     Do you (patient/family) have any concerns for transition/discharge? None   satisfied with care at [address] [place] [address] for utilizing home health:   none indicated     Current Advanced Directive/Advance Care Plan:  Full Code      Healthcare Decision Maker:   Click here to complete [address] including selection of the Healthcare Decision Maker Relationship (ie \"Primary\")              Transition of Care Plan:       Return to [address] [place] [address]   and medical follow up    CM met with patient and daughter in patient's ER room. Patient was non verbal, but did nod to questions. .  Daughter was alert and pleasant      Demographics confirmed: yes  uses daughter's address   Living Situation: lives at [address] [place] [address] -- room 116  has been at the facility for 2 years  DME: wheelchair  ADLs: needs assistance with all adl's and iadl's  transfers --staff assist with feeding at all meals   Pharmacy: at the [address] [place] [address]  Previous HH/SNF/rehab: lives in [address] [place]: Medicare and [employer]  Transportation: S  does not ambulate    Daughter stated that patient moved to the [address] [place] [address] 2 years ago. She does not ambulate-- uses wheelchair for mobility    Staff assist with transfers to the wheelchair and pushes patient to cafe for meals or for programs. Patient has one other adult daughter who lives in [place]. She is supportive. Daughter, Ella Giraldo states patient receives very  good care at the [place]. CM talked with Chaya Franco (don assistant) at [employer]-- She advised that CM call either her or Micki Shin when patient is close to discharge. CM will need to send medicals that are requested      CM will follow and assist with transportation to return to [place]. Patient and daughter provided with first medicare letter 1/2923. Second letter will be provided prior to discharge. Care Management Interventions  PCP Verified by CM: Yes  Mode of Transport at Discharge: S  Transition of Care Consult (CM Consult):  Other  Discharge Durable Medical Equipment: No  Physical Therapy Consult: Yes  Occupational Therapy Consult: Yes  Speech Therapy Consult: Yes  Support Systems: Child(liliana), Other Family Member(s)  Confirm Follow Up Transport: Other (see comment) ([facility])  Discharge Location  Patient Expects to be Discharged to[de-identified] Assisted Living

## 2023-01-30 NOTE — PROGRESS NOTES
SPEECH LANGUAGE PATHOLOGY BEDSIDE SWALLOW EVALUATION  Patient: Tabitha Powell (11 y.o. female)  Date: 1/30/2023  Primary Diagnosis: CHF exacerbation ([facility] [I50.9]  JIM (acute kidney injury) ([facility] [N17.9]       Precautions:   Fall, Skin (w/c dependant x1 year)    ASSESSMENT :  Evaluation complete. Pt with functional oropharyngeal phases of swallow without any overt difficulty. Pt reportedly refusing to eat applesauce and take meds with applesauce prior to admission. She takes them with oatmeal. Therefore, suspect that pt's oral holding of medications was due to this. Therefore, recommend regular diet/thin liquids with meds in ice cream or pudding, and not with water or applesauce. Will follow-up x1 for tolerance. Patient will benefit from skilled intervention to address the above impairments. Patients rehabilitation potential is considered to be Fair     PLAN :  Recommendations and Planned Interventions:  --regular diet/thin liquids  --good oral care  --1:1 assistance feeding    Frequency/Duration: Patient will be followed by speech-language pathology 2 times a week to address goals. Discharge Recommendations: To Be Determined     SUBJECTIVE:   Patient stated, \"Oh hi.     OBJECTIVE:     Past Medical History:   Diagnosis Date    A-fib ([facility]     Anemia     Bilateral cataracts     Bronchitis     CKD (chronic kidney disease)     COPD (chronic obstructive pulmonary disease) (HCC)     Edema     GERD (gastroesophageal reflux disease)     HTN (hypertension)     Hypercholesterolemia     Hyperlipidemia     Insomnia     Major depressive disorder     Stuttering     Vitamin deficiency      Past Surgical History:   Procedure Laterality Date    HX CAROTID ENDARTERECTOMY      HX HYSTERECTOMY      HX TONSILLECTOMY       Prior Level of Function/Home Situation:   Home Situation  Home Environment: Assisted living  [facility]  # Steps to Enter: 0  One/Two Story Residence: One story  Living Alone: No  Support Systems: Child(liliana), Other Family Member(s)  Patient Expects to be Discharged to[de-identified] Assisted Living  Current DME Used/Available at Home: Wheelchair, Grab bars, Hospital bed  Tub or Shower Type: Shower  Diet prior to admission: regular diet/thin liquids  Current Diet:  NPO   Cognitive and Communication Status:  Neurologic State: Alert, Eyes open to stimulus  Orientation Level: Disoriented X4  Cognition: Decreased attention/concentration, Decreased command following, Impaired decision making, Memory loss, Poor safety awareness  Perception: Tactile, Verbal, Visual (LOB to L)  Perseveration: No perseveration noted  Safety/Judgement: Decreased awareness of environment, Decreased awareness of need for assistance, Decreased awareness of need for safety, Decreased insight into deficits, Fall prevention  Oral Assessment:  Oral Assessment  Labial: No impairment  Oral Hygiene: oral mucosa dry  Lingual: No impairment  Velum: No impairment  Mandible: No impairment  P.O. Trials:  Patient Position: upright in stretcher  Vocal quality prior to P.O.:  (presbyphonia)  Consistency Presented: Thin liquid; Solid  How Presented: Self-fed/presented;Cup/sip;Spoon;Straw;Successive swallows     Bolus Acceptance: No impairment  Bolus Formation/Control: No impairment     Propulsion: No impairment  Oral Residue: None  Initiation of Swallow: No impairment     Aspiration Signs/Symptoms: None  Pharyngeal Phase Characteristics: No impairment, issues, or problems              Oral Phase Severity: No impairment  Pharyngeal Phase Severity : No impairment    NOMS:   The NOMS functional outcome measure was used to quantify this patient's level of swallowing impairment.   Based on the NOMS, the patient was determined to be at level 7 for swallow function       NOMS Swallowing Levels:  Level 1 (CN): NPO  Level 2 (CM): NPO but takes consistency in therapy  Level 3 (CL): Takes less than 50% of nutrition p.o. and continues with nonoral feedings; and/or safe with mod cues; and/or max diet restriction  Level 4 (CK): Safe swallow but needs mod cues; and/or mod diet restriction; and/or still requires some nonoral feeding/supplements  Level 5 (CJ): Safe swallow with min diet restriction; and/or needs min cues  Level 6 (CI): Independent with p.o.; rare cues; usually self cues; may need to avoid some foods or needs extra time  Level 7 ([address] [place] [address]): Independent for all p.o.  TYRELL. (2003). National Outcomes Measurement System (NOMS): Adult Speech-Language Pathology User's Guide. Pain:  Pain Scale 1: Numeric (0 - 10)  Pain Intensity 1: 0       After treatment:   Call bell within reach and Nursing notified    COMMUNICATION/EDUCATION:       The patient's plan of care including recommendations, planned interventions, and recommended diet changes were discussed with: Registered nurse. Patient is unable to participate in goal setting and plan of care. Pt's daughter at bedside and very helpful and knowledgeable. Thank you for this referral.  Darya Santana, SLP  Time Calculation: 15 mins          Problem: Dysphagia (Adult)  Goal: *Acute Goals and Plan of Care (Insert Text)  Description: Speech Therapy Goals  Initiated 1/30/23    1. Patient will tolerate baseline diet without adverse effects within 7 days.    Outcome: Progressing Towards Goal

## 2023-01-30 NOTE — CONSULTS
[facility]                       Cardiology Care Note     [x]Initial Consult     []Progress note    Patient Name: Erin OBRIEN:7/32/1944 - FUQ:[id number]  Primary Cardiologist: Maria L Fry Cardiology Physicians: Ekaterina Ochoa MD  Consulting Cardiologist: Maria L Fry Cardiology Physicians: Donna Larsen MD     Reason for consult: heart failure. Patient seen and examined by me with the above nurse practitioner. I personally performed all components of the history, physical, and medical decision making and agree with the assessment and plan with minor modifications as noted. Today the patient presents with shortness of breath and subjective facial edema. Chest x-ray was read as mild interstitial edema, but on personal review, I would say it looked very mild at best.  Creatinine is up from baseline 1.9-3.5/3.6. General PE  Gen:  NAD  Mental Status - Alert. General Appearance - Not in acute distress. HEENT:  PERRL, no carotid bruits or JVD  Chest and Lung Exam   Inspection: Accessory muscles - No use of accessory muscles in breathing. Auscultation:   Breath sounds: - Normal.   Cardiovascular   Inspection: Jugular vein - Bilateral - Inspection Normal.   Palpation/Percussion:   Apical Impulse: - Normal.   Auscultation: Rhythm - Regular. Heart Sounds - S1 WNL and S2 WNL. No S3 or S4. Murmurs & Other Heart Sounds: Auscultation of the heart reveals - No Murmurs. Peripheral Vascular   Upper Extremity: Inspection - Bilateral - No Cyanotic nailbeds or Digital clubbing. Lower Extremity:   Palpation: Edema - Bilateral - No edema. Abdomen:   Soft, non-tender, bowel sounds are active. Neuro: A&O times 3, CN and motor grossly WNL    Status post diuresis, feeling better on minimal oxygen.   With her significantly elevated creatinine, clear lungs and no lower extremity edema, would hold off on the second dose of diuresis today, recheck creatinine in the morning. Continue to follow hemoglobin. HPI:       Allen Crisostomo is a 80 y.o. female with PMH significant for hypertension, hyperlipidemia, atrial fibrillation on Eliquis, CKD, reported CHF on torsemide who presents for evaluation of edema. SUBJECTIVE:      Allen Crisostomo  Daughter states pt lives in facility and is mostly non-verbal. She does not feed herself but can swallow liquids. Per daughter, she developed edema about a week ago, but she looked better on Thursday. She had developed facial edema initially which was felt to be d/t her sleeping most of the day with her head down in her wheelchair. She also states that her mother looks more pale. Assessment and Plan     HFpEF - ProBNP 11,315. edema improving per daughter, EF 60-65% in 2021, on torsemide PTA  JIM - baseline ~1.7, up to 3.61. I/O - 2100  since admission. Will hold second dose of IV bumex. Anemia - hgb 7.5, on oac for high CHADSVASC-2 score. Hemoccult negative. Reported acute metabolic encephalopathy vs baseline mental status - CT with newer left cerebellar infarct. Chronic small vessel ischemic changes. MRI pending. Per RN, pt is not taking pills. Will change bb to IV. HsTrop normal, elevated probnp. Agree with echo. Not volume overloaded on PE. Chest xray is not overly concerning. Multiple medical issues and advanced age.      ____________________________________________________________    Cardiac testing  04/23/21    ECHO ADULT COMPLETE 04/25/2021 4/25/2021    Interpretation Summary  · LV: Estimated LVEF is 60 - 65%. Visually measured ejection fraction. Normal cavity size and systolic function (ejection fraction normal). Mild concentric hypertrophy. Mild (grade 1) left ventricular diastolic dysfunction. · Saline contrast was given to evaluate for intracardiac shunt. · IAS: No atrial septal defect present. Agitated saline contrast study was performed.   · AV: Probably trileaflet aortic valve. · Pericardium: Trivial pericardial effusion. Pericardial fat pad present. Signed by: Jacquelyn Alicea MD on 4/25/2021  5:24 PM              Most recent HS troponins:  Recent Labs     01/29/23  1349   TROPHS 20       ECG: Normal sinus rhythm   Low voltage QRS   Septal infarct    Review of Systems:    [x]All other systems reviewed and all negative except as written in HPI    [] Patient unable to provide secondary to condition    Past Medical History:   Diagnosis Date    A-fib ([facility] [address] [facility]     Anemia     Bilateral cataracts     Bronchitis     CKD (chronic kidney disease)     COPD (chronic obstructive pulmonary disease) (HCC)     Edema     GERD (gastroesophageal reflux disease)     HTN (hypertension)     Hypercholesterolemia     Hyperlipidemia     Insomnia     Major depressive disorder     Stuttering     Vitamin deficiency      Past Surgical History:   Procedure Laterality Date    HX CAROTID ENDARTERECTOMY      HX HYSTERECTOMY      HX TONSILLECTOMY       Social Hx:  reports that she has never smoked. She has never used smokeless tobacco. She reports that she does not drink alcohol and does not use drugs. Family Hx: family history is not on file.   Allergies   Allergen Reactions    Keflex [Cephalexin] Hives          OBJECTIVE:  Wt Readings from Last 3 Encounters:   01/30/23 125 lb (56.7 kg)   01/18/23 125 lb (56.7 kg)   10/07/22 140 lb 10.5 oz (63.8 kg)       Intake/Output Summary (Last 24 hours) at 1/30/2023 1109  Last data filed at 1/30/2023 0700  Gross per 24 hour   Intake --   Output 2100 ml   Net -2100 ml       Physical Exam:    Vitals:   Vitals:    01/30/23 0345 01/30/23 0700 01/30/23 0827 01/30/23 1039   BP: (!) 178/70 (!) 167/65 (!) 162/88 (!) 162/88   Pulse: 74 90 90    Resp:  17 13    Temp:  98.1 °F (36.7 °C)     SpO2:  99% 98%    Weight:    125 lb (56.7 kg)   Height:    5' 5\" (1.651 m)     Telemetry: normal sinus rhythm, burst of PAF    Gen: Well-developed, well-nourished, in no acute distress  Neck: Supple, No JVD, No Carotid Bruit  Resp: No accessory muscle use, Clear breath sounds, No rales or rhonchi  Card: Regular Rate,Rythm, Normal S1, S2, No murmurs, rubs or gallop. No thrills. Abd:   Soft, non-tender, non-distended, BS+   MSK: No cyanosis  Skin: No rashes    Neuro: Moving all four extremities, follows commands appropriately  Psych:  pleasant, Nml Affect  LE: No edema    Data Review:     Radiology:   XR Results (most recent):  Results from Hospital Encounter encounter on 01/29/23    XR CHEST PORT    Narrative  EXAM: XR CHEST PORT    INDICATION: Shortness of breath, CHF. COMPARISON: Chest views on 2/27/2020. TECHNIQUE: Semiupright portable chest AP view    FINDINGS: Cardiac monitoring wires overlie the thorax. The cardiomediastinal and  hilar contours are within normal limits. The pulmonary vasculature is within  normal limits. Low lung volumes. Bilateral reticular interstitial opacities are mild. Possible  right small pleural effusion. No pneumothorax. Bones are osteopenic. Impression  Mild CHF pattern of pulmonary edema. Small right pleural effusion. Recommend  followup PA and lateral chest views in 8-10 weeks to ensure resolution. Recent Labs     01/30/23  0708 01/29/23  1349    137   K 4.3 5.0   * 110*   CO2 21 24   BUN 44* 44*   CREA 3.51* 3.61*   GLU 90 99   PHOS 4.8* 4.7   CA 8.3* 8.1*     Recent Labs     01/30/23  0708 01/29/23  1349   WBC 12.7* 9.3   HGB 7.5* 8.9*   HCT 23.9* 29.3*    369     Recent Labs     01/29/23  1349   PTP 11.8*   INR 1.1   *     No results for input(s): CHOL, LDLC in the last 72 hours.     No lab exists for component: TGL, HDLC,  HBA1C      Current meds:    Current Facility-Administered Medications:     metoprolol (LOPRESSOR) injection 2.5 mg, 2.5 mg, IntraVENous, Q6H, Destiny Luna H, ANP    sodium chloride (NS) flush 5-40 mL, 5-40 mL, IntraVENous, Q8H, Faiza Pierson MD, 10 mL at 01/30/23 0729    sodium chloride (NS) flush 5-40 mL, 5-40 mL, IntraVENous, PRN, Arias Villagomez MD    acetaminophen (TYLENOL) tablet 650 mg, 650 mg, Oral, Q6H PRN **OR** acetaminophen (TYLENOL) suppository 650 mg, 650 mg, Rectal, Q6H PRN, Arias Villagomez MD    polyethylene glycol (MIRALAX) packet 17 g, 17 g, Oral, DAILY PRN, Arias Villagomez MD    ondansetron (ZOFRAN ODT) tablet 4 mg, 4 mg, Oral, Q6H PRN **OR** ondansetron (ZOFRAN) injection 4 mg, 4 mg, IntraVENous, Q6H PRN, Arias Villagomez MD    [Held by provider] furosemide (LASIX) injection 80 mg, 80 mg, IntraVENous, BID, Arias Villagomez MD, 80 mg at 01/30/23 0854    amLODIPine (NORVASC) tablet 5 mg, 5 mg, Oral, DAILY, Arias Villagomez MD    atorvastatin (LIPITOR) tablet 80 mg, 80 mg, Oral, QHS, Arias Villagomez MD    [facility] by provider] carvediloL (COREG) tablet 12.5 mg, 12.5 mg, Oral, BID WITH MEALS, Arias Villagomez MD    apixaban Marene Betsy) tablet 2.5 mg, 2.5 mg, Oral, BID, Arias Villagomez MD    escitalopram oxalate (LEXAPRO) tablet 10 mg, 10 mg, Oral, DAILY, Arias Villagomez MD    folic acid (FOLVITE) tablet 1 mg, 1 mg, Oral, DAILY, Arias Villagomez MD    venlafaxine-SR [facility] capsule 75 mg, 75 mg, Oral, DAILY, Arias Villagomez MD, 75 mg at 01/30/23 0845    labetaloL (NORMODYNE;TRANDATE) injection 10 mg, 10 mg, IntraVENous, Q2H PRN, Arias Villagomez MD, 10 mg at 01/30/23 9391    Current Outpatient Medications:     potassium chloride (KLOR-CON) 10 mEq tablet, Take 10 mEq by mouth daily. , Disp: , Rfl:     acetaminophen (TYLENOL) 325 mg tablet, Take 650 mg by mouth every six (6) hours as needed for Pain., Disp: , Rfl:     albuterol-ipratropium (DUO-NEB) 2.5 mg-0.5 mg/3 ml nebu, 3 mL by Nebulization route two (2) times a day., Disp: , Rfl:     torsemide (DEMADEX) 100 mg tablet, Take 50 mg by mouth daily. , Disp: , Rfl:     amLODIPine (NORVASC) 5 mg tablet, Take 1 Tablet by mouth daily. , Disp: 30 Tablet, Rfl: 5    cyanocobalamin 1,000 mcg tablet, Take 1 Tab by mouth daily. , Disp: 30 Tab, Rfl: 0    atorvastatin (LIPITOR) 80 mg tablet, Take 1 Tab by mouth nightly., Disp: 30 Tab, Rfl: 0    carvediloL (COREG) 12.5 mg tablet, Take 1 Tab by mouth two (2) times daily (with meals). , Disp: 60 Tab, Rfl: 5    escitalopram oxalate (LEXAPRO) 10 mg tablet, Take 10 mg by mouth daily. , Disp: , Rfl:     cetirizine (ZYRTEC) 10 mg tablet, Take 10 mg by mouth two (2) times a day., Disp: , Rfl:     folic acid (FOLVITE) 1 mg tablet, Take 1 mg by mouth daily. , Disp: , Rfl:     Eliquis 2.5 mg tablet, TAKE 1 TABLET BY MOUTH TWICE A DAY, Disp: 60 Tab, Rfl: 2    venlafaxine-SR (EFFEXOR-XR) 75 mg capsule, Take 75 mg by mouth daily. , Disp: , Rfl:     Destiny Noel, [address] [facility] Cardiology  Call center: J) [phone]) [phone]      Nani Galdamez MD

## 2023-01-30 NOTE — PROGRESS NOTES
Physical Therapy - defer  Order received, chart reviewed, met w/ pt and her daughter in the ED. Pt did not participate in discussions. Per pt's daughter, pt resides in an residential and is wheelchair bound. Staff assist with bed mobility, transfers, wheelchair management,  ADLs and feeding. Acute PT is not indicated at this time. Will sign off. Recommend return to residential w/ continued staff support at discharge.

## 2023-01-30 NOTE — NURSE NAVIGATOR
HEART FAILURE NURSE NAVIGATOR NOTE  [facility]    Patient chart was reviewed by Heart Failure (HF) Nurse Navigators for compliance of prescribed treatment with guidelines directed medical therapy (GDMT) and HF database completed. Please, review beneath recommendations for symptomatic patients with HF with Preserved Ejection Fraction ? 50% (HFpEF) for your consideration when taking care of this patient. Current HF Medical Therapy:      Name Joselyn DICK 1932   LVEF Prior EF 60/65% (2021); repeat echo pending   NYHA Functional Class Documentation needed   ARNi/ACEi/ARB    Aldosterone Antagonist    SGLT2 inhibitor    Consulting Cardiologist CAV     Recommendations for HF Management:    For patients with HFpEF ? 50%, consider adding the following GDMT, if appropriate:  SGLT2 inhibitor [Class 2a]  ARNi or ARB [Class 2b]  Aldosterone antagonist [Class 2b]  Adjust antihypertensive therapy [Class 1]  Adjust diuretic dose at discharge if hospitalized for volume overload [Class 1]  For patient with hyperkalemia while on RAASi > 5.5, consider adding potassium binders (patiromer, sodium zirconium cycosilicate) [Class 2a]    Patients with suspected cardiac amyloidosis (older > 61years old with LVH > 1.2cm and/or any other signs of amyloidosis) should be offered screening labs and imaging [Class 1]: (a) serum gammopathy profile and UPEP with immunofixation, and (b) PYP test. If PYP test is positive patient should have genetic testing done for inherited ATTR amyloidosis. If any findings are positive or you need genetic testing ordered, please, consider in-patient consultation or referral to [address] [place] [address]. Note that the following medications may be potentially harmful in heart failure [Class 3].   Calcium channel blockers (doxazosin, diltiazem, verapamil, nifedipine)  Antiarrhythmics (flecanide, disopyrimide, sotalol, dronedarone)  Diabetes medications (thiasolidinediones, saxagliptin, alogliptin)  NSAIDs and LOERA 2 inhibitors    Consider vaccinations for respiratory illnesses (flu, pneumonia, covid) [Class 2b]    Due to h/o recurrent hospitalizations this patient may benefit from referral to Advanced Heart Failure Program to assess suitability for advanced therapies, such as left-ventricular assist device, heart transplantation, palliative inotropes or palliation [Class 1]. To obtain in-patient consultation or refer to [address] [place] [address], call [phone]    Patient Heart Failure Education:     Teach back in heart failure education provided, including information about medical therapy, lifestyle modifications, diet and fluid restrictions, physical activity. Educational resources provided: Living with Heart Failure booklet; Signs/Symptoms magnet; Weight Calendar; [facility] flyer; Preparing for Successful Discharge check list.  Information provided about HF support group. Heart failure avoiding triggers on discharge instructions. Plan for Transitional Care:    Post discharge follow up phone call to be made within 48-72 hours of discharge. Patient will follow-up with PCP. Patient will follow-up with Primary Cardiologist.  Patient screened for obstructive sleep apnea and referred to Sleep Medicine. Referral/follow-up with Cardiac Rehabilitation. Referral/follow-up with Advanced Heart Failure Specialist.  Referral/follow-up with Palliative Care Specialist.        Heart Failure Nurse Navigator  Phone: [phone] / [phone]    *Recommendations listed above are based on the guidelines: 2022 AHA/ACC/HFSA Guideline for the Management of Heart Failure: A Report of the [address] on Clinical Practice Guidelines. Circulation 2022; [id number].  and 2017 AHA/ACC/HRS guideline for management of patients with ventricular arrhythmias and the prevention of sudden cardiac death: A Report of the [address] Task Force on Clinical Practice Guidelines and the Heart Rhythm Society.  Heart Rhythm, Vol 15, No 10, October 2018 *Class of Recommendation: Class 1 (strong), Class 2a (moderate), Class 2b (weak), Class 3 (not recommended, potentially harmful)

## 2023-01-30 NOTE — PROGRESS NOTES
OCCUPATIONAL THERAPY EVALUATION/DISCHARGE  Patient: Francine Schroeder (53 y.o. female)  Date: 1/30/2023  Primary Diagnosis: [facility] exacerbation ([facility] [I50.9]  JIM (acute kidney injury) ([email or web address] [N17.9]       Precautions: Fall, Skin (w/c dependant x1 year)    ASSESSMENT  Based on the objective data described below, the patient presents with impaired cognition and command following, but able to follow at leastv 50% simple commands, decreased strength, endurance, mobility, balance, coordination and safety following admission from [facility] for CHF exacerbation and JIM. Pt is at her unfortunate baseline w/c dependant x1 year gets assist with all ADLs. Pt did state she feeds herself. At this time due to unfamiliar environment and baseline dementia, pt will require assist for self feeding. Recommend return to [facility] at discharge. No further skilled OT required at this time. Current Level of Function (ADLs/self-care): total A    Functional Outcome Measure: The patient scored 0/100 on the Barthel Index outcome measure. Other factors to consider for discharge: see above     PLAN :  Recommend with staff: total care    Recommendation for discharge: (in order for the patient to meet his/her long term goals)  No skilled occupational therapy/ follow up rehabilitation needs identified at this time. This discharge recommendation:  Has not yet been discussed the attending provider and/or case management    IF patient discharges home will need the following DME: patient owns DME required for discharge       SUBJECTIVE:   Patient stated popcorn.  - when asked what her favorite food is.     OBJECTIVE DATA SUMMARY:   HISTORY:   Past Medical History:   Diagnosis Date    A[facility]fib ([facility]     Anemia     Bilateral cataracts     Bronchitis     CKD (chronic kidney disease)     COPD (chronic obstructive pulmonary disease) (HCC)     Edema     GERD (gastroesophageal reflux disease)     HTN (hypertension)     Hypercholesterolemia     Hyperlipidemia Insomnia     Major depressive disorder     Stuttering     Vitamin deficiency      Past Surgical History:   Procedure Laterality Date    HX CAROTID ENDARTERECTOMY      HX HYSTERECTOMY      HX TONSILLECTOMY         Prior Level of Function/Environment/Context: w/c dependant x1 year gets assist with all ADLs  Expanded or extensive additional review of patient history:   Home Situation  Home Environment: [address] [place] [address] Road Name: Maureen Mercedes  # Steps to Enter: 0  One/Two Story Residence: One story  Living Alone: No  Support Systems: Child(liliana), Other Family Member(s), Assisted Living  Patient Expects to be Discharged to[de-identified] Assisted Living  Current DME Used/Available at Home: Wheelchair, Grab bars, Hospital bed  Tub or Shower Type: Shower    Hand dominance: Right    EXAMINATION OF PERFORMANCE DEFICITS:  Cognitive/Behavioral Status:  Neurologic State: Alert;Eyes open to stimulus  Orientation Level: Disoriented X4  Cognition: Decreased attention/concentration;Decreased command following; Impaired decision making;Memory loss;Poor safety awareness  Perception: Tactile;Verbal;Visual (LOB to L)  Perseveration: No perseveration noted  Safety/Judgement: Decreased awareness of environment;Decreased awareness of need for assistance;Decreased awareness of need for safety;Decreased insight into deficits; Fall prevention    Edema: BUEs L > R    Hearing:   Auditory  Auditory Impairment: None    Vision/Perceptual:    Acuity:  (unable to assess due to imp cognition)         Range of Motion:  AROM: Generally decreased, functional (pt moves all extremities against gravity; mild LE knee flexion contractures noted L > R)  PROM: Generally decreased, functional                      Strength:  Strength: Generally decreased, functional (pt moves all extremities against gravity)                Coordination:  Coordination: Grossly decreased, non-functional  Fine Motor Skills-Upper: Left Impaired;Right Impaired    Gross Motor Skills-Upper: Left Impaired;Right Impaired    Tone & Sensation:  Tone: Abnormal (pt resistive at times to mobility)                         Balance:  Sitting:  (pt resistive to attempt)    Functional Mobility and Transfers for ADLs:  Bed Mobility:  Rolling: Moderate assistance; Additional time;Assist x1  Supine to Sit:  (pt resistive to attempt)    Transfers: Toilet Transfer : Total assistance (bed level)    ADL Assessment and Intervention:  Feeding: Total assistance    Oral Facial Hygiene/Grooming: Total assistance    Bathing: Total assistance    Upper Body Dressing: Total assistance    Lower Body Dressing: Total assistance    Toileting: Total assistance    Cognitive Retraining  Safety/Judgement: Decreased awareness of environment;Decreased awareness of need for assistance;Decreased awareness of need for safety;Decreased insight into deficits; Fall prevention      Functional Measure:    Barthel Index:  Bathin  Bladder: 0  Bowels: 0  Groomin  Dressin  Feedin  Mobility: 0  Stairs: 0  Toilet Use: 0  Transfer (Bed to Chair and Back): 0  Total: 0/100      The Barthel ADL Index: Guidelines  1. The index should be used as a record of what a patient does, not as a record of what a patient could do. 2. The main aim is to establish degree of independence from any help, physical or verbal, however minor and for whatever reason. 3. The need for supervision renders the patient not independent. 4. A patient's performance should be established using the best available evidence. Asking the patient, friends/relatives and nurses are the usual sources, but direct observation and common sense are also important. However direct testing is not needed. 5. Usually the patient's performance over the preceding 24-48 hours is important, but occasionally longer periods will be relevant. 6. Middle categories imply that the patient supplies over 50 per cent of the effort.   7. Use of aids to be independent is allowed. Score Interpretation (from [facility] 83)    Independent   60-79 Minimally independent   40-59 Partially dependent   20-39 Very dependent   <20 Totally dependent     -Ayo Key., Barthel, D.W. (1965). Functional evaluation: the Barthel Index. [address] ([address] (1997). The Barthel activities of daily living index: self-reporting versus actual performance in the old (> or = 75 years). Journal [address] 45(7), [facility], J.KYARAF, [facility] Janessa.Lauren. (1999). Measuring the change in disability after inpatient rehabilitation; comparison of the responsiveness of the Barthel Index and Functional Amo Measure. Journal of Neurology, Neurosurgery, and Psychiatry, 66(4), 698-177. RADHA Guthrie, HARISH Victor, & Laura Teran MCHELSY. (2004) Assessment of post-stroke quality of life in cost-effectiveness studies: The usefulness of the Barthel Index and the EuroQoL-5D. Quality of Life Research, 15, 622-11        Occupational Therapy Evaluation Charge Determination   History Examination Decision-Making   LOW Complexity : Brief history review  HIGH Complexity : 5 or more performance deficits relating to physical, cognitive , or psychosocial skils that result in activity limitations and / or participation restrictions HIGH Complexity : Patient presents with comorbidities that affect occupational performance.  Signifigant modification of tasks or assistance (eg, physical or verbal) with assessment (s) is necessary to enable patient to complete evaluation       Based on the above components, the patient evaluation is determined to be of the following complexity level: LOW   Pain Rating:  No signs of pain    Activity Tolerance:   Poor    After treatment patient left in no apparent distress:    Supine in bed and Call bell within reach    COMMUNICATION/EDUCATION:   The patients plan of care was discussed with: Physical therapist and Registered nurse.      Thank you for this referral.  Lidia Monteiro OT  Time Calculation: 22 mins

## 2023-01-30 NOTE — PROGRESS NOTES
Notified Soledad Clark NP that patient unable to participate in swallow screen at this time due to decreased ability to follow commands (no change from baseline on arrival to ED today). Will make patient NPO and order SLP consult per protocol. PO meds held at this time.

## 2023-01-30 NOTE — ED NOTES
Hospitalist called daughter and daughter is not answering and voicemail is full.  Just called daughter and she is answering now, doctor aware

## 2023-01-31 LAB
ALBUMIN SERPL-MCNC: 2.1 G/DL (ref 3.5–5)
ANION GAP SERPL CALC-SCNC: 7 MMOL/L (ref 5–15)
ATRIAL RATE: 75 BPM
BASOPHILS # BLD: 0.1 K/UL (ref 0–0.1)
BASOPHILS NFR BLD: 1 % (ref 0–1)
BUN SERPL-MCNC: 47 MG/DL (ref 6–20)
BUN/CREAT SERPL: 13 (ref 12–20)
CALCIUM SERPL-MCNC: 7.8 MG/DL (ref 8.5–10.1)
CALCIUM SERPL-MCNC: 8.4 MG/DL (ref 8.5–10.1)
CALCULATED P AXIS, ECG09: 100 DEGREES
CALCULATED R AXIS, ECG10: 34 DEGREES
CALCULATED T AXIS, ECG11: 39 DEGREES
CHLORIDE SERPL-SCNC: 108 MMOL/L (ref 97–108)
CO2 SERPL-SCNC: 25 MMOL/L (ref 21–32)
CREAT SERPL-MCNC: 3.49 MG/DL (ref 0.55–1.02)
DIAGNOSIS, 93000: NORMAL
DIFFERENTIAL METHOD BLD: ABNORMAL
EOSINOPHIL # BLD: 0.1 K/UL (ref 0–0.4)
EOSINOPHIL NFR BLD: 1 % (ref 0–7)
ERYTHROCYTE [DISTWIDTH] IN BLOOD BY AUTOMATED COUNT: 12.3 % (ref 11.5–14.5)
EST. AVERAGE GLUCOSE BLD GHB EST-MCNC: 91 MG/DL
GLUCOSE SERPL-MCNC: 96 MG/DL (ref 65–100)
HBA1C MFR BLD: 4.8 % (ref 4–5.6)
HCT VFR BLD AUTO: 24.5 % (ref 35–47)
HGB BLD-MCNC: 7.8 G/DL (ref 11.5–16)
IMM GRANULOCYTES # BLD AUTO: 0.1 K/UL (ref 0–0.04)
IMM GRANULOCYTES NFR BLD AUTO: 1 % (ref 0–0.5)
LYMPHOCYTES # BLD: 1.3 K/UL (ref 0.8–3.5)
LYMPHOCYTES NFR BLD: 10 % (ref 12–49)
MCH RBC QN AUTO: 29.3 PG (ref 26–34)
MCHC RBC AUTO-ENTMCNC: 31.8 G/DL (ref 30–36.5)
MCV RBC AUTO: 92.1 FL (ref 80–99)
MONOCYTES # BLD: 1.3 K/UL (ref 0–1)
MONOCYTES NFR BLD: 10 % (ref 5–13)
NEUTS SEG # BLD: 10.8 K/UL (ref 1.8–8)
NEUTS SEG NFR BLD: 77 % (ref 32–75)
NRBC # BLD: 0 K/UL (ref 0–0.01)
NRBC BLD-RTO: 0 PER 100 WBC
P-R INTERVAL, ECG05: 178 MS
PHOSPHATE SERPL-MCNC: 5.1 MG/DL (ref 2.6–4.7)
PLATELET # BLD AUTO: 307 K/UL (ref 150–400)
PMV BLD AUTO: 9.9 FL (ref 8.9–12.9)
POTASSIUM SERPL-SCNC: 4 MMOL/L (ref 3.5–5.1)
PTH-INTACT SERPL-MCNC: 216.7 PG/ML (ref 18.4–88)
Q-T INTERVAL, ECG07: 406 MS
QRS DURATION, ECG06: 78 MS
QTC CALCULATION (BEZET), ECG08: 453 MS
RBC # BLD AUTO: 2.66 M/UL (ref 3.8–5.2)
SODIUM SERPL-SCNC: 140 MMOL/L (ref 136–145)
VENTRICULAR RATE, ECG03: 75 BPM
WBC # BLD AUTO: 13.7 K/UL (ref 3.6–11)

## 2023-01-31 PROCEDURE — 83970 ASSAY OF PARATHORMONE: CPT

## 2023-01-31 PROCEDURE — 74011000250 HC RX REV CODE- 250: Performed by: INTERNAL MEDICINE

## 2023-01-31 PROCEDURE — 74011250637 HC RX REV CODE- 250/637: Performed by: INTERNAL MEDICINE

## 2023-01-31 PROCEDURE — 86060 ANTISTREPTOLYSIN O TITER: CPT

## 2023-01-31 PROCEDURE — 74011250636 HC RX REV CODE- 250/636: Performed by: INTERNAL MEDICINE

## 2023-01-31 PROCEDURE — 83036 HEMOGLOBIN GLYCOSYLATED A1C: CPT

## 2023-01-31 PROCEDURE — 85025 COMPLETE CBC W/AUTO DIFF WBC: CPT

## 2023-01-31 PROCEDURE — 74011250637 HC RX REV CODE- 250/637: Performed by: STUDENT IN AN ORGANIZED HEALTH CARE EDUCATION/TRAINING PROGRAM

## 2023-01-31 PROCEDURE — 82784 ASSAY IGA/IGD/IGG/IGM EACH: CPT

## 2023-01-31 PROCEDURE — 80069 RENAL FUNCTION PANEL: CPT

## 2023-01-31 PROCEDURE — 99233 SBSQ HOSP IP/OBS HIGH 50: CPT | Performed by: INTERNAL MEDICINE

## 2023-01-31 PROCEDURE — 36415 COLL VENOUS BLD VENIPUNCTURE: CPT

## 2023-01-31 PROCEDURE — 77010033678 HC OXYGEN DAILY

## 2023-01-31 PROCEDURE — 74011000250 HC RX REV CODE- 250: Performed by: NURSE PRACTITIONER

## 2023-01-31 PROCEDURE — 86160 COMPLEMENT ANTIGEN: CPT

## 2023-01-31 PROCEDURE — 80074 ACUTE HEPATITIS PANEL: CPT

## 2023-01-31 PROCEDURE — 86038 ANTINUCLEAR ANTIBODIES: CPT

## 2023-01-31 PROCEDURE — 65270000046 HC RM TELEMETRY

## 2023-01-31 PROCEDURE — 86162 COMPLEMENT TOTAL (CH50): CPT

## 2023-01-31 PROCEDURE — 83516 IMMUNOASSAY NONANTIBODY: CPT

## 2023-01-31 PROCEDURE — 84156 ASSAY OF PROTEIN URINE: CPT

## 2023-01-31 PROCEDURE — 86037 ANCA TITER EACH ANTIBODY: CPT

## 2023-01-31 RX ORDER — CARVEDILOL 12.5 MG/1
12.5 TABLET ORAL 2 TIMES DAILY WITH MEALS
Status: DISCONTINUED | OUTPATIENT
Start: 2023-01-31 | End: 2023-02-06 | Stop reason: HOSPADM

## 2023-01-31 RX ORDER — AMLODIPINE BESYLATE 5 MG/1
5 TABLET ORAL 2 TIMES DAILY
Status: DISCONTINUED | OUTPATIENT
Start: 2023-01-31 | End: 2023-02-06 | Stop reason: HOSPADM

## 2023-01-31 RX ADMIN — ESCITALOPRAM 10 MG: 10 TABLET, FILM COATED ORAL at 10:03

## 2023-01-31 RX ADMIN — IRON SUCROSE 200 MG: 20 INJECTION, SOLUTION INTRAVENOUS at 14:43

## 2023-01-31 RX ADMIN — ATORVASTATIN CALCIUM 80 MG: 20 TABLET, FILM COATED ORAL at 21:33

## 2023-01-31 RX ADMIN — APIXABAN 2.5 MG: 2.5 TABLET, FILM COATED ORAL at 17:45

## 2023-01-31 RX ADMIN — FOLIC ACID 1 MG: 1 TABLET ORAL at 10:03

## 2023-01-31 RX ADMIN — SODIUM CHLORIDE, PRESERVATIVE FREE 10 ML: 5 INJECTION INTRAVENOUS at 14:19

## 2023-01-31 RX ADMIN — VENLAFAXINE HYDROCHLORIDE 75 MG: 37.5 CAPSULE, EXTENDED RELEASE ORAL at 10:03

## 2023-01-31 RX ADMIN — AMLODIPINE BESYLATE 5 MG: 5 TABLET ORAL at 17:42

## 2023-01-31 RX ADMIN — SODIUM CHLORIDE, PRESERVATIVE FREE 10 ML: 5 INJECTION INTRAVENOUS at 06:37

## 2023-01-31 RX ADMIN — METOPROLOL TARTRATE 2.5 MG: 1 INJECTION, SOLUTION INTRAVENOUS at 06:37

## 2023-01-31 RX ADMIN — APIXABAN 2.5 MG: 2.5 TABLET, FILM COATED ORAL at 10:03

## 2023-01-31 RX ADMIN — SODIUM CHLORIDE, PRESERVATIVE FREE 10 ML: 5 INJECTION INTRAVENOUS at 21:37

## 2023-01-31 RX ADMIN — AMLODIPINE BESYLATE 5 MG: 5 TABLET ORAL at 10:03

## 2023-01-31 RX ADMIN — METOPROLOL TARTRATE 2.5 MG: 1 INJECTION, SOLUTION INTRAVENOUS at 11:41

## 2023-01-31 RX ADMIN — CARVEDILOL 12.5 MG: 12.5 TABLET, FILM COATED ORAL at 14:18

## 2023-01-31 RX ADMIN — FUROSEMIDE 80 MG: 10 INJECTION, SOLUTION INTRAMUSCULAR; INTRAVENOUS at 17:44

## 2023-01-31 NOTE — CONSULTS
NEPHROLOGY CONSULT NOTE     Patient: Bhavya Schaeffer MRN: [id number]  PCP: Manuel Robertson MD   :     1932  Age:   80 y.o. Sex:  female      Referring physician: Maile Mayberry MD  Reason for consultation: 80 y.o. 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 Nl size kidneys  -no prior nephrology w/up. Has nephrotic syndrome, had proteinuria in  also. UA bland  -no need for RRT  -check PTH  -avoid NSAIDs and Iv contrast    Nephrotic syndrome,   --Ddx includes MGN, amyloidosis, Multiple myeloma, no h/o DM, TIN, etc  -will do serology work up, A1C  -Needs renal biopsy but Given her age and comorbidities recommend conservative management. Daughter also is not interested in the aggressive  management including renal biopsy and dialysis  -resume lasix 80 mg IV bid  -monitor renal function. Uncontrolled HTN  -increase amlodipine to 5 mg bid    Acute on chronic HFpEF  -resume lasix    Anemia of CKD  -low iron 17%, start IV iron    HLD       Active Problems / Assessment AAActive  : Active Problems:    CHF exacerbation ([facility] (2023)      JIM (acute kidney injury) ([facility] (8025)      Diastolic CHF, chronic (HCC) (2023)      Decreased functional mobility and endurance (2023)      Abnormal CT of brain (2023)         Subjective:   HPI: Bhavya Schaeffer is a 80 y.o.  female who has been admitted to the hospital for edema and lethargy. Patient has had increased edema for a week. Her daughter does not know the details. She was found to have pulm edema and worsening renal function. Cr in  was 1.8 and has increased to 3.5, eGFR 12. She has proteinuria with UPCR almost 13 gr. She is anemic. She had proteinuria in  also. Has not seen a nephrologist. Bps have been high.  Unclear if she has been on NSAIDs. She denies SOB, her edema has improved per daughter since she has been in hospital. She was on lasix which held due to her renal function. Past Medical Hx:   Past Medical History:   Diagnosis Date    A-fib ([address]     Anemia     Bilateral cataracts     Bronchitis     CKD (chronic kidney disease)     COPD (chronic obstructive pulmonary disease) (HCC)     Edema     GERD (gastroesophageal reflux disease)     HTN (hypertension)     Hypercholesterolemia     Hyperlipidemia     Insomnia     Major depressive disorder     Stuttering     Vitamin deficiency         Past Surgical Hx:     Past Surgical History:   Procedure Laterality Date    HX CAROTID ENDARTERECTOMY      HX HYSTERECTOMY      HX TONSILLECTOMY         Medications:  Prior to Admission medications    Medication Sig Start Date End Date Taking? Authorizing Provider   potassium chloride (KLOR-CON) 10 mEq tablet Take 10 mEq by mouth daily. 9/22/21  Yes Provider, Historical   acetaminophen (TYLENOL) 325 mg tablet Take 650 mg by mouth every six (6) hours as needed for Pain. Yes Provider, Historical   albuterol-ipratropium (DUO-NEB) 2.5 mg-0.5 mg/3 ml nebu 3 mL by Nebulization route two (2) times a day. Yes Provider, Historical   torsemide (DEMADEX) 100 mg tablet Take 50 mg by mouth daily. Yes Provider, Historical   amLODIPine (NORVASC) 5 mg tablet Take 1 Tablet by mouth daily. 6/29/21  Yes Scottie Eli MD   cyanocobalamin 1,000 mcg tablet Take 1 Tab by mouth daily. 4/28/21  Yes Abi Young, DO   atorvastatin (LIPITOR) 80 mg tablet Take 1 Tab by mouth nightly. 4/27/21  Yes Abi Young, DO   carvediloL (COREG) 12.5 mg tablet Take 1 Tab by mouth two (2) times daily (with meals). 3/23/21  Yes Scottie Eli MD   escitalopram oxalate (LEXAPRO) 10 mg tablet Take 10 mg by mouth daily. 1/19/21  Yes Provider, Historical   cetirizine (ZYRTEC) 10 mg tablet Take 10 mg by mouth two (2) times a day.  6/3/20  Yes Provider, Historical folic acid (FOLVITE) 1 mg tablet Take 1 mg by mouth daily. Yes Provider, Historical   Eliquis 2.5 mg tablet TAKE 1 TABLET BY MOUTH TWICE A DAY 7/21/20  Yes Vincent Han MD   venlafaxine-SR [facility] 75 mg capsule Take 75 mg by mouth daily. Yes Provider, Historical       Allergies   Allergen Reactions    Keflex [Cephalexin] Hives       Social Hx:  reports that she has never smoked. She has never used smokeless tobacco. She reports that she does not drink alcohol and does not use drugs. No family history on file. Review of Systems:  A twelve point review of system was performed today. Pertinent positives and negatives are mentioned in the HPI. The reminder of the ROS is negative and noncontributory. Objective:    Vitals:    Vitals:    01/31/23 0959 01/31/23 1000 01/31/23 1127 01/31/23 1200   BP: (!) 195/96  (!) 172/80    Pulse:  (!) 111 73 77   Resp:   16    Temp:   98.8 °F (37.1 °C)    SpO2:   97%    Weight:       Height:         I&O's:  No intake/output data recorded. Visit Vitals  BP (!) 172/80 (BP 1 Location: Left upper arm, BP Patient Position: At rest;Lying)   Pulse 77   Temp 98.8 °F (37.1 °C)   Resp 16   Ht 5' 5\" (1.651 m)   Wt 56.7 kg (125 lb)   SpO2 97%   BMI 20.80 kg/m²       Physical Exam:  General:Alert, No distress,   HEENT: Eyes are PERRL. Conjunctiva without pallor ,erythema. The sclerae without icterus.  .   Neck:Supple, JVD+  Lungs : decrease breathing sounds  Bibasilar, Normal respiratory effort  CVS: RRR, S1 S2 normal, No rub, 1+ LE edema  Abdomen: Soft, Non tender, No hepatosplenomegaly, bowel sounds present  Extremities: No cyanosis, No clubbing, BL upper ext edema and sacral edema  Skin: No rash   Neurologic: non focal, nl speech  Psych: normal affect    Laboratory Results:    Recent Results (from the past 24 hour(s))   PROCALCITONIN    Collection Time: 01/30/23  2:53 PM   Result Value Ref Range    Procalcitonin 0.05 ng/mL   GLUCOSE, POC    Collection Time: 01/30/23  4:19 PM   Result Value Ref Range    Glucose (POC) 87 65 - 117 mg/dL    Performed by NABIL Magaña    CBC WITH AUTOMATED DIFF    Collection Time: 01/31/23  5:00 AM   Result Value Ref Range    WBC 13.7 (H) 3.6 - 11.0 K/uL    RBC 2.66 (L) 3.80 - 5.20 M/uL    HGB 7.8 (L) 11.5 - 16.0 g/dL    HCT 24.5 (L) 35.0 - 47.0 %    MCV 92.1 80.0 - 99.0 FL    MCH 29.3 26.0 - 34.0 PG    MCHC 31.8 30.0 - 36.5 g/dL    RDW 12.3 11.5 - 14.5 %    PLATELET 390 198 - 025 K/uL    MPV 9.9 8.9 - 12.9 FL    NRBC 0.0 0  WBC    ABSOLUTE NRBC 0.00 0.00 - 0.01 K/uL    NEUTROPHILS 77 (H) 32 - 75 %    LYMPHOCYTES 10 (L) 12 - 49 %    MONOCYTES 10 5 - 13 %    EOSINOPHILS 1 0 - 7 %    BASOPHILS 1 0 - 1 %    IMMATURE GRANULOCYTES 1 (H) 0.0 - 0.5 %    ABS. NEUTROPHILS 10.8 (H) 1.8 - 8.0 K/UL    ABS. LYMPHOCYTES 1.3 0.8 - 3.5 K/UL    ABS. MONOCYTES 1.3 (H) 0.0 - 1.0 K/UL    ABS. EOSINOPHILS 0.1 0.0 - 0.4 K/UL    ABS. BASOPHILS 0.1 0.0 - 0.1 K/UL    ABS. IMM.  GRANS. 0.1 (H) 0.00 - 0.04 K/UL    DF AUTOMATED     RENAL FUNCTION PANEL    Collection Time: 01/31/23  5:00 AM   Result Value Ref Range    Sodium 140 136 - 145 mmol/L    Potassium 4.0 3.5 - 5.1 mmol/L    Chloride 108 97 - 108 mmol/L    CO2 25 21 - 32 mmol/L    Anion gap 7 5 - 15 mmol/L    Glucose 96 65 - 100 mg/dL    BUN 47 (H) 6 - 20 MG/DL    Creatinine 3.49 (H) 0.55 - 1.02 MG/DL    BUN/Creatinine ratio 13 12 - 20      eGFR 12 (L) >60 ml/min/1.73m2    Calcium 8.4 (L) 8.5 - 10.1 MG/DL    Phosphorus 5.1 (H) 2.6 - 4.7 MG/DL    Albumin 2.1 (L) 3.5 - 5.0 g/dL        Lab Results   Component Value Date    BUN 47 (H) 01/31/2023     01/31/2023    K 4.0 01/31/2023     01/31/2023    CO2 25 01/31/2023       Lab Results   Component Value Date    BUN 47 (H) 01/31/2023    BUN 44 (H) 01/30/2023    BUN 44 (H) 01/29/2023    BUN 30 (H) 04/24/2021    BUN 30 (H) 04/23/2021    K 4.0 01/31/2023    K 4.3 01/30/2023    K 5.0 01/29/2023    K 3.9 04/24/2021    K 4.1 04/23/2021       Lab Results   Component Value Date    WBC 13.7 (H) 01/31/2023    RBC 2.66 (L) 01/31/2023    HGB 7.8 (L) 01/31/2023    HCT 24.5 (L) 01/31/2023    MCV 92.1 01/31/2023    MCH 29.3 01/31/2023    RDW 12.3 01/31/2023     01/31/2023       Lab Results   Component Value Date    PHOS 5.1 (H) 01/31/2023       Urine dipstick:   Lab Results   Component Value Date/Time    Color YELLOW/STRAW 01/29/2023 06:51 PM    Appearance CLEAR 01/29/2023 06:51 PM    Specific gravity 1.011 01/29/2023 06:51 PM    Specific gravity 1.020 04/23/2021 03:44 PM    pH (UA) 7.0 01/29/2023 06:51 PM    Protein 300 (A) 01/29/2023 06:51 PM    Glucose 100 (A) 01/29/2023 06:51 PM    Ketone Negative 01/29/2023 06:51 PM    Bilirubin Negative 01/29/2023 06:51 PM    Urobilinogen 0.2 01/29/2023 06:51 PM    Nitrites Negative 01/29/2023 06:51 PM    Leukocyte Esterase Negative 01/29/2023 06:51 PM    Epithelial cells FEW 01/29/2023 06:51 PM    Bacteria 2+ (A) 01/29/2023 06:51 PM    WBC 0-4 01/29/2023 06:51 PM    RBC 0-5 01/29/2023 06:51 PM       I have reviewed the following: All pertinent labs, microbiology data, radiology imaging for my assessment     ECG- Rev: Yes / No  Xray/CT/US/MRI REV: Yes/ No    Care Plan discussed with:  daughter     Chart reviewed. Total time spent with patient:  45 min  Medications list Personally Reviewed   [x]      Yes     []               No      Thank you for allowing us to participate in the care of this patient. We will follow patient.  Please dont hesitate to call with any questions    Gilberto Chiu MD  1/31/2023    [id number] [employer]

## 2023-01-31 NOTE — PROGRESS NOTES
[facility]                       Cardiology Care Note     []Initial Consult     [x]Progress note    Patient Name: Cheryle Skeen - TAE:[birth year] - DLO:[id number]  Primary Cardiologist: [address] [place] [address] Cardiology Physicians: Gilda Alfredo MD  Consulting Cardiologist: [address] [place] [address] Cardiology Physicians: Dea Guzman MD     Reason for consult: heart failure. Patient seen and examined by me with the above nurse practitioner. I personally performed all components of the history, physical, and medical decision making and agree with the assessment and plan with minor modifications as noted. Today the patient presents with shortness of breath and subjective facial edema. Chest x-ray was read as mild interstitial edema, but on personal review, I would say it looked very mild at best.  Creatinine is up from baseline 1.9-3.5/3.6-->3.5. General PE  Gen:  NAD  Mental Status -sleeping comfortably  HEENT:  PERRL, no carotid bruits or JVD  Chest and Lung Exam   Inspection: Accessory muscles - No use of accessory muscles in breathing. Auscultation:   Breath sounds: - Normal.   Cardiovascular   Inspection: Jugular vein - Bilateral - Inspection Normal.   Palpation/Percussion:   Apical Impulse: - Normal.   Auscultation: Rhythm - Regular. Heart Sounds - S1 WNL and S2 WNL. No S3 or S4. Murmurs & Other Heart Sounds: Auscultation of the heart reveals - No Murmurs. Peripheral Vascular   Upper Extremity: Inspection - Bilateral - No Cyanotic nailbeds or Digital clubbing. Lower Extremity:   Palpation: Edema - Bilateral - No edema. Abdomen:   Soft, non-tender, bowel sounds are active. Neuro: Sleeping comfortably     Status post diuresis, feeling better on minimal oxygen. Nephrology is following her for nephrotic syndrome and has resumed diuretics. Intermittent AF noted on telemetry-continue beta-blocker and anticoagulation.    Altered mental status continues, and family has opted for conservative medical management which seems very appropriate. Ejection fraction normal.  We will sign off for now. Please call if we can assist further during this hospitalization. Follow-up with Dr. Felicity Marshall after discharge, unless the patient moves to comfort care. HPI:       Yuni Daniel is a 80 y.o. female with PMH significant for hypertension, hyperlipidemia, atrial fibrillation on Eliquis, CKD, reported CHF on torsemide who presents for evaluation of edema. SUBJECTIVE:      Yuni Daniel  Daughter states pt lives in facility and is mostly non-verbal. She does not feed herself but can swallow liquids. Per daughter, she developed edema about a week ago, but she looked better on Thursday. She had developed facial edema initially which was felt to be d/t her sleeping most of the day with her head down in her wheelchair. Interim:  Pt in bed, arousal to stimuli. Not communicating. Assessment and Plan     HFpEF - ProBNP 11,315. edema improving per daughter, EF 60-65% in 2021, on torsemide PTA  JIM - baseline ~1.7, up to 3.61. I/O - 2100  since admission. Will hold second dose of IV bumex. Anemia - hgb 7.5, on oac for high CHADSVASC-2 score. Hemoccult negative. Reported acute metabolic encephalopathy vs baseline mental status - CT with newer left cerebellar infarct. Chronic small vessel ischemic changes. MRI pending. Per MAR, pt is still not taking pills po. Continue with IV bb. HsTrop normal, elevated probnp. Echo confirmed normal EF Not volume overloaded on PE. Chest xray is not overly concerning. Cr remains up at 3.49. Hgb stable at 7.8. Multiple medical issues and advanced age. No further cardiac work up necessary as it would not change pt management. Cardiology will sign off. Thank you for this consultation.       ____________________________________________________________    Cardiac testing  04/23/21    ECHO ADULT COMPLETE 04/25/2021 4/25/2021    Interpretation Summary  · LV: Estimated LVEF is 60 - 65%. Visually measured ejection fraction. Normal cavity size and systolic function (ejection fraction normal). Mild concentric hypertrophy. Mild (grade 1) left ventricular diastolic dysfunction. · Saline contrast was given to evaluate for intracardiac shunt. · IAS: No atrial septal defect present. Agitated saline contrast study was performed. · AV: Probably trileaflet aortic valve. · Pericardium: Trivial pericardial effusion. Pericardial fat pad present. Signed by: Gillian Reed MD on 4/25/2021  5:24 PM              Most recent HS troponins:  Recent Labs     01/29/23  1349   TROPHS 20       ECG: Normal sinus rhythm   Low voltage QRS   Septal infarct    Review of Systems:    [x]All other systems reviewed and all negative except as written in HPI    [] Patient unable to provide secondary to condition    Past Medical History:   Diagnosis Date    A-fib ([facility]     Anemia     Bilateral cataracts     Bronchitis     CKD (chronic kidney disease)     COPD (chronic obstructive pulmonary disease) (HCC)     Edema     GERD (gastroesophageal reflux disease)     HTN (hypertension)     Hypercholesterolemia     Hyperlipidemia     Insomnia     Major depressive disorder     Stuttering     Vitamin deficiency      Past Surgical History:   Procedure Laterality Date    HX CAROTID ENDARTERECTOMY      HX HYSTERECTOMY      HX TONSILLECTOMY       Social Hx:  reports that she has never smoked. She has never used smokeless tobacco. She reports that she does not drink alcohol and does not use drugs. Family Hx: family history is not on file.   Allergies   Allergen Reactions    Keflex [Cephalexin] Hives          OBJECTIVE:  Wt Readings from Last 3 Encounters:   01/30/23 125 lb (56.7 kg)   01/18/23 125 lb (56.7 kg)   10/07/22 140 lb 10.5 oz (63.8 kg)     No intake or output data in the 24 hours ending 01/31/23 1000      Physical Exam:    Vitals:   Vitals:    01/31/23 0426 01/31/23 0600 01/31/23 0637 01/31/23 0843   BP: (!) 157/63  (!) 140/55 114/89   Pulse: 80 79  69   Resp:    15   Temp:       SpO2:    98%   Weight:       Height:         Telemetry: normal sinus rhythm, burst of PAF    Gen: Lethargic, in no acute distress  Neck: Supple, No JVD, No Carotid Bruit  Resp: No accessory muscle use, Clear breath sounds, No rales or rhonchi  Card: Regular Rate,Rythm, Normal S1, S2, No murmurs, rubs or gallop. No thrills. Abd:   Soft, non-tender, non-distended, BS+   MSK: No cyanosis  Skin: No rashes    Neuro: Moving all four extremities, follows commands appropriately  Psych:  pleasant, Nml Affect  LE: No edema    Data Review:     Radiology:   XR Results (most recent):  Results from Hospital Encounter encounter on 01/29/23    XR CHEST PORT    Narrative  EXAM: XR CHEST PORT    INDICATION: Shortness of breath, CHF. COMPARISON: Chest views on 2/27/2020. TECHNIQUE: Semiupright portable chest AP view    FINDINGS: Cardiac monitoring wires overlie the thorax. The cardiomediastinal and  hilar contours are within normal limits. The pulmonary vasculature is within  normal limits. Low lung volumes. Bilateral reticular interstitial opacities are mild. Possible  right small pleural effusion. No pneumothorax. Bones are osteopenic. Impression  Mild CHF pattern of pulmonary edema. Small right pleural effusion. Recommend  followup PA and lateral chest views in 8-10 weeks to ensure resolution. Recent Labs     01/31/23  0500 01/30/23  0708    140   K 4.0 4.3    111*   CO2 25 21   BUN 47* 44*   CREA 3.49* 3.51*   GLU 96 90   PHOS 5.1* 4.8*   CA 8.4* 8.3*     Recent Labs     01/31/23  0500 01/30/23  0708   WBC 13.7* 12.7*   HGB 7.8* 7.5*   HCT 24.5* 23.9*    279     Recent Labs     01/29/23  1349   PTP 11.8*   INR 1.1   *     No results for input(s): CHOL, LDLC in the last 72 hours.     No lab exists for component: TGL, HDLC,  HBA1C      Current meds:    Current Facility-Administered Medications:     metoprolol (LOPRESSOR) injection 2.5 mg, 2.5 mg, IntraVENous, Q6H, Destiny Luna ANP, 2.5 mg at 01/31/23 3463    sodium chloride (NS) flush 5-40 mL, 5-40 mL, IntraVENous, Q8H, Huan Rabago MD, 10 mL at 01/31/23 1582    sodium chloride (NS) flush 5-40 mL, 5-40 mL, IntraVENous, PRN, Huan Rabago MD    acetaminophen (TYLENOL) tablet 650 mg, 650 mg, Oral, Q6H PRN **OR** acetaminophen (TYLENOL) suppository 650 mg, 650 mg, Rectal, Q6H PRN, Huan Rabago MD    polyethylene glycol (MIRALAX) packet 17 g, 17 g, Oral, DAILY PRN, Huan Rabago MD    ondansetron (ZOFRAN ODT) tablet 4 mg, 4 mg, Oral, Q6H PRN **OR** ondansetron (ZOFRAN) injection 4 mg, 4 mg, IntraVENous, Q6H PRN, Huan Rabago MD    [Held by provider] furosemide (LASIX) injection 80 mg, 80 mg, IntraVENous, BID, Huan Rabago MD, 80 mg at 01/30/23 0854    amLODIPine (NORVASC) tablet 5 mg, 5 mg, Oral, DAILY, Huan Rabago MD    atorvastatin (LIPITOR) tablet 80 mg, 80 mg, Oral, QHS, Huan Rabago MD, 80 mg at 01/30/23 2250    [Held by provider] carvediloL (COREG) tablet 12.5 mg, 12.5 mg, Oral, BID WITH MEALS, Huan Rabago MD    apixaban [place]) tablet 2.5 mg, 2.5 mg, Oral, BID, Huan Rabago MD    escitalopram oxalate (LEXAPRO) tablet 10 mg, 10 mg, Oral, DAILY, Huan Rabago MD    folic acid (FOLVITE) tablet 1 mg, 1 mg, Oral, DAILY, Huan Rabago MD    venlafaxine-SR [facility] capsule 75 mg, 75 mg, Oral, DAILY, Huan Rabago MD, 75 mg at 01/30/23 0845    labetaloL (NORMODYNE;TRANDATE) injection 10 mg, 10 mg, IntraVENous, Q2H PRN, Huan Rabago MD, 10 mg at 01/30/23 [id number] [facility]  Call center: [phone]      Steve Morton MD

## 2023-01-31 NOTE — PROGRESS NOTES
Occupational Therapy Note:  Re-consult orders received and appreciated. Chart reviewed. There has been no change in status since OT eval yesterday. Pt is dependant at baseline with ADLs and functional mobility in her senior living. No skilled OT services required at this time. Will complete OT order. Thank you for the re-consult.   Gianna Pizarro, OTR/L, CBIS

## 2023-01-31 NOTE — PROGRESS NOTES
[facility] Adult  Hospitalist Group                                                                                          Hospitalist Progress Note  Dez Masters MD  Answering service: [phone] OR 36 from in house phone        Date of Service:  2023  NAME:  Rudy Barnhart  :  1932  MRN:  [id number]       Admission Summary:   HPI: Wilma Low is a 80 y.o. female from [address] [facility] [address] with h/o HTN, afib on Eliquis, CKD, CHF on torsemide, and mostly nonverbal per daughter who p/w increasing generalized edema over the last several days, also lethargy and AMS. Per daughter, she speaks minimally \"she just doesn't want to speak\" (possibly because of a stutter), she sleeps [place] lot,\" doesn't eat/drink much and doesn't like being told what to do. Per daughter, she developed edema about a week ago, but she looked better on Thursday. She had developed facial edema initially which was felt to be d/t her sleeping most of the day with her head down in her wheelchair. No known falls. She has been wheelchair-bound for over a year. The daughter says the facility takes good care of her. Pt in NAD. Denies pain. \"          Interval history / Subjective:   Demented largely nonverbal unable to obtain any ROS from her     Assessment & Plan:     Anasarca  JIM on CKD stage IV > nephrotic syndrome?   HFpEF exacerbation class II  Acute metabolic encephalopathy on chronic dementia  Hypertension  History of A. fib currently NSR  Chronic cerebellar infarct  Acute on chronic ACD  Depression  Hyperlipidemia  Generalized deconditioning   -Appreciate cardiology and nephro  -TTE appears stable ef  -DRE neg for hydro  -per nephro, resume diuretics lasix BID   -ANCAs, complements ordered, patient will need renal biopsy to confirm diagnosis but age and other comorbidities would like to continue with conservative management  - CT head showed age-indeterminate infarct, MRI brain consistent with chronic findings  -ua neg, tsh b12 folate wnl   -FOBT is negative, anemia work-up consistent with ACD likely secondary to renal insufficiency  - Continue to monitor CBC, transfuse for hemoglobin less than 7  -passed speech eval  -cont Eliquis, statin Continue metoprolol  -New SSRI/SNRI  - PT OT    Critically ill high risk for decompensation. CCT time 30 minutes    Spoke to patient's daughter updated on plan. She is agreeable to dnr, patient is demented does not have capacity to make decisions. DNR forms completed. Palliative has also been consulted for goals of care, appreciate involvement        Code status: dnr  Prophylaxis: Eliquis  Care Plan discussed with: patient/family, nurse, case management  Anticipated Disposition:   Inpatient > 48 hrs   Cardiac monitoring: Telemetry     Hospital Problems  Date Reviewed: 10/1/2021            Codes Class Noted POA    Diastolic CHF, chronic ([facility] ICD-10-CM: I50.32  ICD-9-CM: 428.32, 428.0  1/30/2023 Unknown        Decreased functional mobility and endurance ICD-10-CM: Z74.09  ICD-9-CM: 780.99  1/30/2023 Unknown        Abnormal CT of brain ICD-10-CM: R90.89  ICD-9-CM: 793.0  1/30/2023 Unknown        CHF exacerbation ([facility] ICD-10-CM: I50.9  ICD-9-CM: 428.0  1/29/2023 Unknown        JIM (acute kidney injury) ([facility] ICD-10-CM: N17.9  ICD-9-CM: 584.9  1/29/2023 Unknown         Social Determinants of Health     Tobacco Use: Low Risk     Smoking Tobacco Use: Never    Smokeless Tobacco Use: Never    Passive Exposure: Not on file   Alcohol Use: Not on file   Financial Resource Strain: Not on file   Food Insecurity: Not on file   Transportation Needs: Not on file   Physical Activity: Not on file   Stress: Not on file   Social Connections: Not on file   Intimate Partner Violence: Not on file   Depression: Not on file   Housing Stability: Not on file       Review of Systems:   A comprehensive review of systems was negative except for that written in the HPI.        Vital Signs:    Last 24hrs VS reviewed since prior progress note. Most recent are:  Visit Vitals  BP (!) 172/80 (BP 1 Location: Left upper arm, BP Patient Position: At rest;Lying)   Pulse 77   Temp 98.8 °F (37.1 °C)   Resp 16   Ht 5' 5\" (1.651 m)   Wt 56.7 kg (125 lb)   SpO2 97%   BMI 20.80 kg/m²       No intake or output data in the 24 hours ending 01/31/23 1333       Physical Examination:     I had a face to face encounter with this patient and independently examined them on 1/31/2023 as outlined below:          Constitutional:  No acute distress, cooperative, pleasant, demented   ENT:  Oral mucosa moist, oropharynx benign. Resp:  CTA bilaterally. No wheezing/rhonchi/rales. No accessory muscle use. CV:  Regular rhythm, normal rate, no murmurs, gallops, rubs    GI:  Soft, non distended, non tender. normoactive bowel sounds, no hepatosplenomegaly     Musculoskeletal:  trace edema, warm, 2+ pulses throughout    Neurologic:  Moves all extremities.   AAOx0, CN II-XII reviewed            Data Review:    Review and/or order of clinical lab test  Review and/or order of tests in the radiology section of CPT  Review and/or order of tests in the medicine section of CPT    I have independently reviewed and interpreted patient's lab and all other diagnostic data    Labs:     Recent Labs     01/31/23  0500 01/30/23  0708   WBC 13.7* 12.7*   HGB 7.8* 7.5*   HCT 24.5* 23.9*    279       Recent Labs     01/31/23  0500 01/30/23  0708 01/29/23  1349    140 137   K 4.0 4.3 5.0    111* 110*   CO2 25 21 24   BUN 47* 44* 44*   CREA 3.49* 3.51* 3.61*   GLU 96 90 99   CA 8.4* 8.3* 8.1*   MG  --   --  2.0   PHOS 5.1* 4.8* 4.7       Recent Labs     01/31/23  0500 01/30/23  0708 01/29/23  1349   ALT  --   --  19   AP  --   --  157*   TBILI  --   --  0.3   TP  --   --  6.1*   ALB 2.1* 2.1* 2.0*   GLOB  --   --  4.1*       Recent Labs     01/29/23  1349   INR 1.1   PTP 11.8*        Recent Labs     01/29/23 1349   TIBC 171*   PSAT 17* FERR 175        Lab Results   Component Value Date/Time    Folate 88.9 (H) 01/29/2023 01:49 PM        No results for input(s): PH, PCO2, PO2 in the last 72 hours. No results for input(s): CPK, CKNDX, TROIQ in the last 72 hours. No lab exists for component: CPKMB  Lab Results   Component Value Date/Time    Cholesterol, total 298 (H) 04/25/2021 03:01 AM    HDL Cholesterol 38 04/25/2021 03:01 AM    LDL, calculated 211.6 (H) 04/25/2021 03:01 AM    Triglyceride 242 (H) 04/25/2021 03:01 AM    CHOL/HDL Ratio 7.8 (H) 04/25/2021 03:01 AM     Lab Results   Component Value Date/Time    Glucose (POC) 87 01/30/2023 04:19 PM     Lab Results   Component Value Date/Time    Color YELLOW/STRAW 01/29/2023 06:51 PM    Appearance CLEAR 01/29/2023 06:51 PM    Specific gravity 1.011 01/29/2023 06:51 PM    Specific gravity 1.020 04/23/2021 03:44 PM    pH (UA) 7.0 01/29/2023 06:51 PM    Protein 300 (A) 01/29/2023 06:51 PM    Glucose 100 (A) 01/29/2023 06:51 PM    Ketone Negative 01/29/2023 06:51 PM    Bilirubin Negative 01/29/2023 06:51 PM    Urobilinogen 0.2 01/29/2023 06:51 PM    Nitrites Negative 01/29/2023 06:51 PM    Leukocyte Esterase Negative 01/29/2023 06:51 PM    Epithelial cells FEW 01/29/2023 06:51 PM    Bacteria 2+ (A) 01/29/2023 06:51 PM    WBC 0-4 01/29/2023 06:51 PM    RBC 0-5 01/29/2023 06:51 PM       Notes reviewed from all clinical/nonclinical/nursing services involved in patient's clinical care. Care coordination discussions were held with appropriate clinical/nonclinical/ nursing providers based on care coordination needs. Patients current active Medications were reviewed, considered, added and adjusted based on the clinical condition today. Home Medications were reconciled to the best of my ability given all available resources at the time of admission. Route is PO if not otherwise noted.       Medications Reviewed:     Current Facility-Administered Medications   Medication Dose Route Frequency carvediloL (COREG) tablet 12.5 mg  12.5 mg Oral BID WITH MEALS    amLODIPine (NORVASC) tablet 5 mg  5 mg Oral BID    sodium chloride (NS) flush 5-40 mL  5-40 mL IntraVENous Q8H    sodium chloride (NS) flush 5-40 mL  5-40 mL IntraVENous PRN    acetaminophen (TYLENOL) tablet 650 mg  650 mg Oral Q6H PRN    Or    acetaminophen (TYLENOL) suppository 650 mg  650 mg Rectal Q6H PRN    polyethylene glycol (MIRALAX) packet 17 g  17 g Oral DAILY PRN    ondansetron (ZOFRAN ODT) tablet 4 mg  4 mg Oral Q6H PRN    Or    ondansetron (ZOFRAN) injection 4 mg  4 mg IntraVENous Q6H PRN    furosemide (LASIX) injection 80 mg  80 mg IntraVENous BID    atorvastatin (LIPITOR) tablet 80 mg  80 mg Oral QHS    apixaban (ELIQUIS) tablet 2.5 mg  2.5 mg Oral BID    escitalopram oxalate (LEXAPRO) tablet 10 mg  10 mg Oral DAILY    folic acid (FOLVITE) tablet 1 mg  1 mg Oral DAILY    venlafaxine-SR (EFFEXOR-XR) capsule 75 mg  75 mg Oral DAILY    labetaloL (NORMODYNE;TRANDATE) injection 10 mg  10 mg IntraVENous Q2H PRN     ______________________________________________________________________  EXPECTED LENGTH OF STAY: 3d 21h  ACTUAL LENGTH OF STAY:          2                 John Monteiro MD

## 2023-01-31 NOTE — PROGRESS NOTES
Order's acknowledged, chart reviewed. Pt evaled on 1/30/2023 and d/c'ed from acute PT due to pt being at baseline which is dependent with ADLs and functional mobility at a wheelchair level at [facility]. No new skilled PT needs at this time. Will complete order, thank you for the re-consult.      Dameon Anguiano PT, DPT

## 2023-01-31 NOTE — PROGRESS NOTES
Physician Progress Note      Mani Young  CSN #:                  [id number]  :                       1932  ADMIT DATE:       2023 1:25 PM  [address] [place] DATE:  RESPONDING  PROVIDER #:        Albert Key MD          QUERY TEXT:    Patient admitted with Anasarca, noted to have a history of atrial fibrillation and is maintained on Eliquis. If possible, please document in progress notes and discharge summary if you are evaluating and/or treating any of the following: The medical record reflects the following:  Risk Factors: HTN, afib on Eliquis, CKD, CHF, COPD, HLD, Effexor 75mg capsule PO daily- PTA    Clinical Indicators:   Cardiology Consultant  \"on oac for high CHADSVASC-2 score\"     H&P  H/o Afib, currently NSR  Cont home Eliquis  No PFO on echo 2021    Treatment: Telemetry level of care, Cardiology consult, VS and cardiac monitoring per unit routine, Eliquis 2.5mg tablet PO 2 times daily, Echo, H&H monitoring,    Thank you,  Niranjan Leija, ROSEANNEN, RN, [facility], [facility]  I am also available via . Options provided:  -- Secondary hypercoagulable state in a patient with atrial fibrillation  -- Other - I will add my own diagnosis  -- Disagree - Not applicable / Not valid  -- Disagree - Clinically unable to determine / Unknown  -- Refer to Clinical Documentation Reviewer    PROVIDER RESPONSE TEXT:    Provider disagreed with this query. Query created by: Chayo Lorenzo on 2023 10:31 AM      QUERY TEXT:    Pt admitted with Anasarca. Noted documentation of Small age-indeterminate left cerebellar infarct in Neurology consultant note on 23.  If possible, please document in progress notes and discharge summary:    The medical record reflects the following:  Risk Factors: \"oac for high CHADSVASC-2 score\", HTN, AFib on Eliquis, CKD, CHF, HLD    Clinical Indicators:   Neurology consultant  Small age-indeterminate left cerebellar infarct noted on CT which appears chronic on MRI scan  It is possible that this may have been related to cardioembolism given her history  She is currently on oral anticoagulation which she should continue  Also on a statin    1/30 MRI  1. No acute infarction. Extensive chronic white matter disease and small chronic  left cerebellar infarction. 2. Numerous chronic microhemorrhages in nonspecific distribution as above. 1/29 CT Head w/ con  IMPRESSION  1. Small age indeterminate left cerebellar infarct, new since April 2021.  2. No intracranial hemorrhage. 3. Periventricular white matter disease is likely secondary to chronic small  vessel ischemic changes. 4. Left supraorbital scalp soft tissue swelling. Treatment: Telemetry level of care, Neurology consult, Brain imaging, Dysphagia screening, SLP consult, Eliquis 2.5mg tablet PO 2 times daily, Lipitor 80mg tablet PO QHS,    Thank you,  ROSEANNE BeebeN, RN, [facility], [address] [facility]  I am also available via PS. Options provided:  -- Small age-indeterminate left cerebellar infarct not clinically significant  -- Small age-indeterminate left cerebellar infarct is clinically significant  -- Other - I will add my own diagnosis  -- Disagree - Not applicable / Not valid  -- Disagree - Clinically unable to determine / Unknown  -- Refer to Clinical Documentation Reviewer    PROVIDER RESPONSE TEXT:    Provider disagreed with this query.   chronic infarct noted on MRI no acute pathology    Query created by: Sam Roberts on 1/31/2023 10:42 AM      Electronically signed by:  Kerline Simon MD 1/31/2023 4:45 PM

## 2023-02-01 ENCOUNTER — APPOINTMENT (OUTPATIENT)
Dept: GENERAL RADIOLOGY | Age: 88
DRG: 698 | End: 2023-02-01
Attending: STUDENT IN AN ORGANIZED HEALTH CARE EDUCATION/TRAINING PROGRAM
Payer: MEDICARE

## 2023-02-01 LAB
ALBUMIN SERPL-MCNC: 1.7 G/DL (ref 3.5–5)
ANA SER QL: NEGATIVE
ANION GAP SERPL CALC-SCNC: 8 MMOL/L (ref 5–15)
ANTI-PLA2R: <1.8 RU/ML (ref 0–19.9)
ASO AB SERPL-ACNC: 103 IU/ML (ref 0–200)
BASOPHILS # BLD: 0.1 K/UL (ref 0–0.1)
BASOPHILS NFR BLD: 1 % (ref 0–1)
BUN SERPL-MCNC: 47 MG/DL (ref 6–20)
BUN/CREAT SERPL: 12 (ref 12–20)
C-ANCA TITR SER IF: NORMAL TITER
C3 SERPL-MCNC: 108 MG/DL (ref 82–167)
C4 SERPL-MCNC: 29 MG/DL (ref 12–38)
CALCIUM SERPL-MCNC: 7.9 MG/DL (ref 8.5–10.1)
CHLORIDE SERPL-SCNC: 108 MMOL/L (ref 97–108)
CO2 SERPL-SCNC: 24 MMOL/L (ref 21–32)
CREAT SERPL-MCNC: 3.81 MG/DL (ref 0.55–1.02)
DIFFERENTIAL METHOD BLD: ABNORMAL
EOSINOPHIL # BLD: 0.3 K/UL (ref 0–0.4)
EOSINOPHIL NFR BLD: 3 % (ref 0–7)
ERYTHROCYTE [DISTWIDTH] IN BLOOD BY AUTOMATED COUNT: 12.6 % (ref 11.5–14.5)
GLUCOSE SERPL-MCNC: 98 MG/DL (ref 65–100)
HAV IGM SER QL: NONREACTIVE
HBV CORE IGM SER QL: REACTIVE
HBV SURFACE AG SER QL: <0.1 INDEX
HBV SURFACE AG SER QL: NEGATIVE
HCT VFR BLD AUTO: 24.9 % (ref 35–47)
HCV AB SERPL QL IA: NONREACTIVE
HGB BLD-MCNC: 7.8 G/DL (ref 11.5–16)
IMM GRANULOCYTES # BLD AUTO: 0 K/UL (ref 0–0.04)
IMM GRANULOCYTES NFR BLD AUTO: 0 % (ref 0–0.5)
LYMPHOCYTES # BLD: 1.5 K/UL (ref 0.8–3.5)
LYMPHOCYTES NFR BLD: 16 % (ref 12–49)
MCH RBC QN AUTO: 29.5 PG (ref 26–34)
MCHC RBC AUTO-ENTMCNC: 31.3 G/DL (ref 30–36.5)
MCV RBC AUTO: 94.3 FL (ref 80–99)
MONOCYTES # BLD: 1.1 K/UL (ref 0–1)
MONOCYTES NFR BLD: 11 % (ref 5–13)
MYELOPEROXIDASE AB SER IA-ACNC: <0.2 UNITS (ref 0–0.9)
NEUTS SEG # BLD: 6.4 K/UL (ref 1.8–8)
NEUTS SEG NFR BLD: 69 % (ref 32–75)
NRBC # BLD: 0 K/UL (ref 0–0.01)
NRBC BLD-RTO: 0 PER 100 WBC
P-ANCA ATYPICAL TITR SER IF: NORMAL TITER
P-ANCA TITR SER IF: NORMAL TITER
PHOSPHATE SERPL-MCNC: 4.4 MG/DL (ref 2.6–4.7)
PLATELET # BLD AUTO: 273 K/UL (ref 150–400)
PMV BLD AUTO: 10.1 FL (ref 8.9–12.9)
POTASSIUM SERPL-SCNC: 3.4 MMOL/L (ref 3.5–5.1)
PROTEINASE3 AB SER IA-ACNC: <0.2 UNITS (ref 0–0.9)
RBC # BLD AUTO: 2.64 M/UL (ref 3.8–5.2)
SODIUM SERPL-SCNC: 140 MMOL/L (ref 136–145)
SP1: ABNORMAL
SP2: ABNORMAL
SP3: ABNORMAL
WBC # BLD AUTO: 9.2 K/UL (ref 3.6–11)

## 2023-02-01 PROCEDURE — 85025 COMPLETE CBC W/AUTO DIFF WBC: CPT

## 2023-02-01 PROCEDURE — 74011250637 HC RX REV CODE- 250/637: Performed by: INTERNAL MEDICINE

## 2023-02-01 PROCEDURE — 74011250636 HC RX REV CODE- 250/636: Performed by: INTERNAL MEDICINE

## 2023-02-01 PROCEDURE — 74011250637 HC RX REV CODE- 250/637: Performed by: STUDENT IN AN ORGANIZED HEALTH CARE EDUCATION/TRAINING PROGRAM

## 2023-02-01 PROCEDURE — 71045 X-RAY EXAM CHEST 1 VIEW: CPT

## 2023-02-01 PROCEDURE — 36415 COLL VENOUS BLD VENIPUNCTURE: CPT

## 2023-02-01 PROCEDURE — 65270000046 HC RM TELEMETRY

## 2023-02-01 PROCEDURE — 74011000250 HC RX REV CODE- 250: Performed by: INTERNAL MEDICINE

## 2023-02-01 PROCEDURE — 99232 SBSQ HOSP IP/OBS MODERATE 35: CPT | Performed by: NURSE PRACTITIONER

## 2023-02-01 PROCEDURE — 51798 US URINE CAPACITY MEASURE: CPT

## 2023-02-01 PROCEDURE — 80069 RENAL FUNCTION PANEL: CPT

## 2023-02-01 RX ORDER — ERGOCALCIFEROL 1.25 MG/1
50000 CAPSULE ORAL
Status: DISCONTINUED | OUTPATIENT
Start: 2023-02-01 | End: 2023-02-06 | Stop reason: HOSPADM

## 2023-02-01 RX ADMIN — CARVEDILOL 12.5 MG: 12.5 TABLET, FILM COATED ORAL at 10:06

## 2023-02-01 RX ADMIN — SODIUM CHLORIDE, PRESERVATIVE FREE 10 ML: 5 INJECTION INTRAVENOUS at 16:41

## 2023-02-01 RX ADMIN — APIXABAN 2.5 MG: 2.5 TABLET, FILM COATED ORAL at 10:06

## 2023-02-01 RX ADMIN — CARVEDILOL 12.5 MG: 12.5 TABLET, FILM COATED ORAL at 18:13

## 2023-02-01 RX ADMIN — AMLODIPINE BESYLATE 5 MG: 5 TABLET ORAL at 10:06

## 2023-02-01 RX ADMIN — FOLIC ACID 1 MG: 1 TABLET ORAL at 10:06

## 2023-02-01 RX ADMIN — IRON SUCROSE 200 MG: 20 INJECTION, SOLUTION INTRAVENOUS at 16:40

## 2023-02-01 RX ADMIN — POTASSIUM BICARBONATE 20 MEQ: 782 TABLET, EFFERVESCENT ORAL at 10:07

## 2023-02-01 RX ADMIN — AMLODIPINE BESYLATE 5 MG: 5 TABLET ORAL at 18:13

## 2023-02-01 RX ADMIN — SODIUM CHLORIDE, PRESERVATIVE FREE 10 ML: 5 INJECTION INTRAVENOUS at 22:02

## 2023-02-01 RX ADMIN — VENLAFAXINE HYDROCHLORIDE 75 MG: 37.5 CAPSULE, EXTENDED RELEASE ORAL at 10:06

## 2023-02-01 RX ADMIN — SODIUM CHLORIDE, PRESERVATIVE FREE 10 ML: 5 INJECTION INTRAVENOUS at 06:33

## 2023-02-01 RX ADMIN — APIXABAN 2.5 MG: 2.5 TABLET, FILM COATED ORAL at 18:13

## 2023-02-01 RX ADMIN — ERGOCALCIFEROL 50000 UNITS: 1.25 CAPSULE ORAL at 10:40

## 2023-02-01 RX ADMIN — ESCITALOPRAM 10 MG: 10 TABLET, FILM COATED ORAL at 10:06

## 2023-02-01 RX ADMIN — ATORVASTATIN CALCIUM 80 MG: 20 TABLET, FILM COATED ORAL at 22:01

## 2023-02-01 NOTE — PROGRESS NOTES
Spiritual Care Assessment/Progress Note  [facility]      NAME: Erin Camarillo      MRN: [id number]  AGE: 80 y.o. SEX: female  Caodaism Affiliation: No preference   Language: English     2/1/2023     Total Time (in minutes): 35     Spiritual Assessment begun in [facility] through conversation with:         []Patient        [x] Family    [] Friend(s)        Reason for Consult: Palliative Care, Initial/Spiritual Assessment     Spiritual beliefs: (Please include comment if needed)     [x] Identifies with a re tradition: Zoroastrian/Not active       [] Supported by a re community:            [] Claims no spiritual orientation:           [] Seeking spiritual identity:                [] Adheres to an individual form of spirituality:           [] Not able to assess:                           Identified resources for coping:      [] Prayer                               [] Music                  [] Guided Imagery     [x] Family/friends                 [] Pet visits     [] Devotional reading                         [] Unknown     [] Other:                                               Interventions offered during this visit: (See comments for more details)          Family/Friend(s):  Affirmation of emotions/emotional suffering, Affirmation of re, Catharsis/review of pertinent events in supportive environment, Coping skills reviewed/reinforced, Iconic (affirming the presence of God/Higher Power), Normalization of emotional/spiritual concerns     Plan of Care:     [] Support spiritual and/or cultural needs    [] Support AMD and/or advance care planning process      [] Support grieving process   [] Coordinate Rites and/or Rituals    [] Coordination with community clergy   [] No spiritual needs identified at this time   [] Detailed Plan of Care below (See Comments)  [] Make referral to Music Therapy  [] Make referral to Pet Therapy     [] Make referral to Addiction services  [] Make referral to [facility] Passages  [] Make referral to Spiritual Care Partner  [] No future visits requested        [x] Follow up visits as needed     Visited patient for palliative initial spiritual assessment. Her chart was consulted. She remained asleep for the duration of visit. Her daughter Laurie Mcdonough was at bedside. The patient lived with Laurie Mcdonough until about two years ago when she was placed in [facility] assisted living. Laurie Mcdonough has been pleased with her mother's care there. Laurie Mcdonough lives with her  who has Alzheimer's. She is his primary support. Laurie Mcdonough reports having no real support system around her but asserts that she is \"fine. \"     Chaplain Radha, MDiv, MS, [facility]

## 2023-02-01 NOTE — PROGRESS NOTES
Renal Progress Note    NAME:  Timur Jordan   :   1932   MRN:   [id number]     Date/Time:  2023 9:36 AM  Subjective:       23-patient seen and examined, sleeping, arousable, denies SONB. On 5 lit O2 this am. Cr higher, no urine output recorded.  Has small amount of urine in purewick container    Review of Systems:  Medications reviewed:  Current Facility-Administered Medications   Medication Dose Route Frequency    carvediloL (COREG) tablet 12.5 mg  12.5 mg Oral BID WITH MEALS    amLODIPine (NORVASC) tablet 5 mg  5 mg Oral BID    iron sucrose (VENOFER) injection 200 mg  200 mg IntraVENous Q24H    sodium chloride (NS) flush 5-40 mL  5-40 mL IntraVENous Q8H    sodium chloride (NS) flush 5-40 mL  5-40 mL IntraVENous PRN    acetaminophen (TYLENOL) tablet 650 mg  650 mg Oral Q6H PRN    Or    acetaminophen (TYLENOL) suppository 650 mg  650 mg Rectal Q6H PRN    polyethylene glycol (MIRALAX) packet 17 g  17 g Oral DAILY PRN    ondansetron (ZOFRAN ODT) tablet 4 mg  4 mg Oral Q6H PRN    Or    ondansetron (ZOFRAN) injection 4 mg  4 mg IntraVENous Q6H PRN    [Held by provider] furosemide (LASIX) injection 80 mg  80 mg IntraVENous BID    atorvastatin (LIPITOR) tablet 80 mg  80 mg Oral QHS    apixaban (ELIQUIS) tablet 2.5 mg  2.5 mg Oral BID    escitalopram oxalate (LEXAPRO) tablet 10 mg  10 mg Oral DAILY    folic acid (FOLVITE) tablet 1 mg  1 mg Oral DAILY    venlafaxine-SR (EFFEXOR-XR) capsule 75 mg  75 mg Oral DAILY    labetaloL (NORMODYNE;TRANDATE) injection 10 mg  10 mg IntraVENous Q2H PRN        Objective:   Vitals:  Visit Vitals  BP (!) 175/68 (BP 1 Location: Left lower arm, BP Patient Position: At rest)   Pulse 66   Temp 98.3 °F (36.8 °C)   Resp 18   Ht 5' 5\" (1.651 m)   Wt 56.7 kg (125 lb)   SpO2 91%   BMI 20.80 kg/m²     Temp (24hrs), Av.1 °F (36.7 °C), Min:97.4 °F (36.3 °C), Max:98.8 °F (37.1 °C)    O2 Flow Rate (L/min): 3 l/min O2 Device: None (Room air)    Last 24hr Input/Output:  No intake or output data in the 24 hours ending 02/01/23 0936     Physical Exam:  General: sleeping, No distress,   HEENT: Eyes are PERRL. Conjunctiva without pallor ,erythema. The sclerae without icterus. .   Neck:Supple, JVD+  Lungs : decrease breathing sounds  Bibasilar, Normal respiratory effort  CVS: RRR, S1 S2 normal, No rub, 1+ LE edema  Abdomen: Soft, Non tender, No hepatosplenomegaly, bowel sounds present  Extremities: less BL upper ext edema and sacral edema, legs 1+  Skin: No rash   Neurologic: nonfocal , nl speech  Psych: normal affect       [] Telemetry Reviewed     [] NSR [] PAC/PVCs   [] Afib  [] Paced   [] NSVT   [] Funes [] NGT  [] Intubated on vent    Lab Data Reviewed:    Recent Results (from the past 24 hour(s))   HEPATITIS PANEL, ACUTE    Collection Time: 01/31/23  1:15 PM   Result Value Ref Range    Hepatitis A, IgM NONREACTIVE NR      __          Hepatitis B surface Ag <0.10 Index    Hep B surface Ag Interp. Negative NEG      __          Hepatitis B core, IgM REACTIVE (A) NR      __          Hep C virus Ab Interp.  NONREACTIVE NR     HEMOGLOBIN A1C WITH EAG    Collection Time: 01/31/23  1:30 PM   Result Value Ref Range    Hemoglobin A1c 4.8 4.0 - 5.6 %    Est. average glucose 91 mg/dL   PTH INTACT    Collection Time: 01/31/23  1:30 PM   Result Value Ref Range    Calcium 7.8 (L) 8.5 - 10.1 MG/DL    PTH, Intact 216.7 (H) 18.4 - 88.0 pg/mL   CBC WITH AUTOMATED DIFF    Collection Time: 02/01/23  5:38 AM   Result Value Ref Range    WBC 9.2 3.6 - 11.0 K/uL    RBC 2.64 (L) 3.80 - 5.20 M/uL    HGB 7.8 (L) 11.5 - 16.0 g/dL    HCT 24.9 (L) 35.0 - 47.0 %    MCV 94.3 80.0 - 99.0 FL    MCH 29.5 26.0 - 34.0 PG    MCHC 31.3 30.0 - 36.5 g/dL    RDW 12.6 11.5 - 14.5 %    PLATELET 123 332 - 953 K/uL    MPV 10.1 8.9 - 12.9 FL    NRBC 0.0 0  WBC    ABSOLUTE NRBC 0.00 0.00 - 0.01 K/uL    NEUTROPHILS 69 32 - 75 %    LYMPHOCYTES 16 12 - 49 %    MONOCYTES 11 5 - 13 %    EOSINOPHILS 3 0 - 7 %    BASOPHILS 1 0 - 1 % IMMATURE GRANULOCYTES 0 0.0 - 0.5 %    ABS. NEUTROPHILS 6.4 1.8 - 8.0 K/UL    ABS. LYMPHOCYTES 1.5 0.8 - 3.5 K/UL    ABS. MONOCYTES 1.1 (H) 0.0 - 1.0 K/UL    ABS. EOSINOPHILS 0.3 0.0 - 0.4 K/UL    ABS. BASOPHILS 0.1 0.0 - 0.1 K/UL    ABS. IMM. GRANS. 0.0 0.00 - 0.04 K/UL    DF AUTOMATED     RENAL FUNCTION PANEL    Collection Time: 02/01/23  5:38 AM   Result Value Ref Range    Sodium 140 136 - 145 mmol/L    Potassium 3.4 (L) 3.5 - 5.1 mmol/L    Chloride 108 97 - 108 mmol/L    CO2 24 21 - 32 mmol/L    Anion gap 8 5 - 15 mmol/L    Glucose 98 65 - 100 mg/dL    BUN 47 (H) 6 - 20 MG/DL    Creatinine 3.81 (H) 0.55 - 1.02 MG/DL    BUN/Creatinine ratio 12 12 - 20      eGFR 11 (L) >60 ml/min/1.73m2    Calcium 7.9 (L) 8.5 - 10.1 MG/DL    Phosphorus 4.4 2.6 - 4.7 MG/DL    Albumin 1.7 (L) 3.5 - 5.0 g/dL         Assessment/Plan:   Active Problems:    CHF exacerbation (HCC) (1/29/2023)      JIM (acute kidney injury) ([facility] (7/71/0563)      Diastolic CHF, chronic (HCC) (1/30/2023)      Decreased functional mobility and endurance (1/30/2023)      Abnormal CT of brain (1/30/2023)         JIM on CKD4 versus progression of CKD4  -DRE no hydro, NO MRD? Nl size kidneys  -no prior nephrology w/up. Has nephrotic syndrome, had proteinuria in 2021 also. UA bland  -no need for RRT, family does not want dialysis  -avoid NSAIDs and Iv contrast  -cr higher, no UO recorded. Will check bladder scan, hold furosemide this afternoon  -Is and Os     Nephrotic syndrome,   --Ddx includes MGN, amyloidosis, Multiple myeloma, no h/o DM, TIN, etc  -f/up  serology work up, A1C  -Needs renal biopsy but Given her age and comorbidities recommend conservative management. Daughter also is not interested in the aggressive  management including renal biopsy and dialysis  -monitor renal function.    -HBsAg neg, HBcore Ab Ig M positive , checkHBsAb, f/up the rest of serology    Hypokalemia, replete-ordered    Secondary hyperpara  -, vit D 20, start vit D Uncontrolled HTN  -increased amlodipine to 5 mg bid     Acute on chronic HFpEF  -hold  lasix this pm  -more hypoxic?  Repeat CXR     Anemia of CKD  -low iron 17%, started IV iron     HLD    Discussed with dr Kya Alvarez  ___________________________________________________    Total time spent with patient:  [] 15   [] 25   [] 35   []  __ minutes    [] Critical Care Provided    Care Plan discussed with:    [] Patient   [] Family    [] Care Manager   [] Consultant/Specialist :      []   >50% of visit spent in counseling and coordination of care   (Discussed [] CODE status,  [] Care Plan, [] D/C Planning)    ___________________________________________________    Attending Physician: Yu Park, [address] [facility]

## 2023-02-01 NOTE — PROGRESS NOTES
Palliative Medicine Consult  Andrés: [phone] ([id number])    Patient Name: Kaya Casiano  YOB: 1932    Date of Initial Consult: 2023  Follow up:  2023  Reason for Consult: care decisions  Requesting Provider: Marcelo Koenig    Primary Care Physician: Martinez Giraldo MD     SUMMARY:   Kaya Casiano is a 80 y.o. presented on 2023 from [facility] due to edema- generalized and facial, impaired cognition. Admitted with a diagnosis of anasarca (possibly nephrotic syndrome), JIM on CKD 4 vs progressive CKD, HFpEF- mild diastolic dysfunction- EF 75-69% in 1164, acute metabolic encephalopathy (? Due to renal status) vs baseline mental status, anemia, temp 100.2    Consults:   Renal, neurology and cardiology     Further workup: Age indeterminate left cerebellar infarct on CT, no new infarct noted on MRI. Repeat echo- EF 55-60% with ? Mild to mod AS, nephrotic syndrome workup           PMH:  HTN, hyperlipidemia, afib (Eliquis), CKD 4, HFpEF, COPD, GERD, depressive disorder (Lexapro and Effexor), carotid endarterectomy, dementia (no formal workup)    Past hospitalizations:  2023  ED visit for left leg pain  10/7/2022  ED visit for right wrist pain  2021-2021- acute lunar infarct  2020- - chronic diastolic heart failure     Current medical issues leading to Palliative Medicine involvement include: care decisions. Social:  patient has lived at [address] [facility] [address] assisted living since 2021. Prior to this, patient lived with her daughter. Requires help with ADLs and requires to be fed, wc dependent x 1+ years, mostly non-verbal (patient stutters so chooses not to speak). Enjoys watching TV-[employer] channel. Patient-  from her first . Second  is - 5-6 years ago. Melissa Carolina (daughter)   Has another daughter, Dorian Sibley, (lives in [place]) but Remy Santanashivani reports she is not involved in her mother's care.       Remy Brown reports her mother has signed an AMD in the past at another hospital. She does not have a copy at this time. Javon Chaidez reports she is the primary decision maker      PALLIATIVE DIAGNOSES:   Palliative care encounter  HFpEF- 55-60%   JIM on CKD 4, possible nephrotic syndrome  Requires assistance for all ADLs  Wheelchair bound   Reluctance to speak due to stuttering   Goals of care conversation     PLAN:   Patient admitted with impaired cognition, anasarca. Patient is at her baseline mental status and functional level at this time. Wheelchair bound for 1+ years, and requires assistance with all ADLs, including eating. The is tolerating a regular diet with no swallowing issues. Neurology workup noted small chronic left cerebellar infarct. Patient has stable EF -55-60%, has intermittent afib- on BB and anticoagulation. Diuretics are being adjusted due to renal function. Remains on supplemental oxygen at this time. Patient undergoing workup for nephrotic syndrome, and management of JIM on CKD 4. No renal biopsy planned and family does not want HD. Family does not want aggressive care due to her age and co-morbidities. DDNR signed by the daughter is on file. Spoke to patient's daughter, Renee uCnningham, and she states she had hospice conversation with Dr. Sundeep Johnson. Javon Chaidez states she does not feel hospice is indicated at this time. Javon Chaidez feels her mother would want to return to the hospital if care was needed. Initial consult note routed to primary continuity provider and/or primary health care team members  Communicated plan of care with: Palliative IDT, Diana [facility] Team     GOALS OF CARE / TREATMENT PREFERENCES:     GOALS OF CARE:   Patient/Health Care Proxy Stated Goals: Prolong life    TREATMENT PREFERENCES:   Code Status: DNR .  DDNR signed with Dr. Sundeep Johnson     Patient and family's personal goals include:  return to [address] upcoming milestones or family events: none reported       Advance Care Planning:  [] [facility] Interdisciplinary Team has updated the ACP Navigator with Health Care Decision Maker and Patient Capacity      Advance Care Planning 2/27/2020   Patient's Healthcare Decision Maker is: Verbal statement (Legal Next of Kin remains as decision maker)   Confirm Advance Directive Yes, not on file       Medical Interventions: Limited additional interventions     Daughter, Theodore Faust, reports she is the primary decision maker   Patient does have a second daughter, Lizzy Marte, who lives in [place]. Other:    As far as possible, the palliative care team has discussed with patient / health care proxy about goals of care / treatment preferences for patient.      HISTORY:     History obtained from: chart, team, patient and dtr     CHIEF COMPLAINT: Pt voiced no complaints at this time     HPI/SUBJECTIVE:    The patient is:   [x] Verbal and participatory (patient did speak a few words)  [] Non-participatory due to: medical condition        Clinical Pain Assessment (nonverbal scale for severity on nonverbal patients):   Clinical Pain Assessment  Severity: 0          Duration: for how long has pt been experiencing pain (e.g., 2 days, 1 month, years)  Frequency: how often pain is an issue (e.g., several times per day, once every few days, constant)     FUNCTIONAL ASSESSMENT:     Palliative Performance Scale (PPS):  PPS: 40       PSYCHOSOCIAL/SPIRITUAL SCREENING:     Palliative IDT has assessed this patient for cultural preferences / practices and a referral made as appropriate to needs (Cultural Services, Patient Advocacy, Ethics, etc.)    Any spiritual / Orthodoxy concerns:  [] Yes /  [x] No  [] Unable to obtain this information  If \"Yes\" to discuss with pastoral care during IDT     Does caregiver feel burdened by caring for their loved one:   [] Yes /  [] No /  [] No Caregiver Present/Available [] No Caregiver [x] Pt Lives at [place] [address]  If \"Yes\" to discuss with social work during IDT    Anticipatory grief assessment:   [x] Normal  / [] Maladaptive   [] Unable to obtain this information  [] n/a  If \"Maladaptive\" to discuss with social work during IDT    ESAS Anxiety: Anxiety: 0    ESAS Depression:          REVIEW OF SYSTEMS:     Positive and pertinent negative findings in ROS are noted above in HPI. The following systems were [x] reviewed / [] unable to be reviewed as noted in HPI  Other findings are noted below. Systems: constitutional, ears/nose/mouth/throat, respiratory, gastrointestinal, genitourinary, musculoskeletal, integumentary, neurologic, psychiatric, endocrine. Positive findings noted below. Modified ESAS Completed by: provider     Drowsiness: 3     Pain: 0   Anxiety: 0 Nausea: 0   Anorexia: 0 Dyspnea: 3     Constipation:  (no BM documented since admission)     Stool Occurrence(s): 0        PHYSICAL EXAM:     From RN flowsheet:  Wt Readings from Last 3 Encounters:   01/30/23 125 lb (56.7 kg)   01/18/23 125 lb (56.7 kg)   10/07/22 140 lb 10.5 oz (63.8 kg)     Blood pressure (!) 156/60, pulse 64, temperature 98.8 °F (37.1 °C), resp. rate 18, height 5' 5\" (1.651 m), weight 125 lb (56.7 kg), SpO2 92 %. Pain Scale 1: Numeric (0 - 10)  Pain Intensity 1: 0  Pain Onset 1: unknown  Pain Location 1: Knee  Pain Orientation 1: Left  Pain Description 1: Aching  Pain Intervention(s) 1: Medication (see MAR)  Last bowel movement, if known: unknown    Constitutional: elderly female, resting in bed, sleeping but easily awakened, alert,  able to answer simple questions. Able to tell me her name, her daughter's name.  Denies pain at this time   Eyes: pupils equal, anicteric, no marked facial edema   ENMT: no nasal discharge, moist mucous membranes  Cardiovascular:  HR 65  Respiratory: breathing not labored, symmetric, 2 LPM, sat 93%    Gastrointestinal: soft non-tender,  :  purewik in place   Musculoskeletal: no deformity, no tenderness to palpation  Skin: warm, dry  Neurologic: following commands, moving all extremities  Psychiatric: no hallucinations  Other:       HISTORY:     Active Problems:    CHF exacerbation ([facility] (1/29/2023)      J[facility]acute [facility]) ([facility] (3/20/7944)      Diastolic CHF, chronic (HCC) (1/30/2023)      Decreased functional mobility and endurance (1/30/2023)      Abnormal CT of brain (1/30/2023)    Past Medical History:   Diagnosis Date    A-fib ([facility]     Anemia     Bilateral cataracts     Bronchitis     CKD (chronic kidney disease)     COPD (chronic obstructive pulmonary disease) (HCC)     Edema     GERD (gastroesophageal reflux disease)     HTN (hypertension)     Hypercholesterolemia     Hyperlipidemia     Insomnia     Major depressive disorder     Stuttering     Vitamin deficiency       Past Surgical History:   Procedure Laterality Date    HX CAROTID ENDARTERECTOMY      HX HYSTERECTOMY      HX TONSILLECTOMY        No family history on file. History reviewed, no pertinent family history.   Social History     Tobacco Use    Smoking status: Never    Smokeless tobacco: Never   Substance Use Topics    Alcohol use: Never     Allergies   Allergen Reactions    Keflex [Cephalexin] Hives      Current Facility-Administered Medications   Medication Dose Route Frequency    ergocalciferol capsule 50,000 Units  50,000 Units Oral Q7D    carvediloL (COREG) tablet 12.5 mg  12.5 mg Oral BID WITH MEALS    amLODIPine (NORVASC) tablet 5 mg  5 mg Oral BID    iron sucrose (VENOFER) injection 200 mg  200 mg IntraVENous Q24H    sodium chloride (NS) flush 5-40 mL  5-40 mL IntraVENous Q8H    sodium chloride (NS) flush 5-40 mL  5-40 mL IntraVENous PRN    acetaminophen (TYLENOL) tablet 650 mg  650 mg Oral Q6H PRN    Or    acetaminophen (TYLENOL) suppository 650 mg  650 mg Rectal Q6H PRN    polyethylene glycol (MIRALAX) packet 17 g  17 g Oral DAILY PRN    ondansetron (ZOFRAN ODT) tablet 4 mg  4 mg Oral Q6H PRN    Or    ondansetron (ZOFRAN) injection 4 mg  4 mg IntraVENous Q6H PRN    [Held by provider] furosemide (LASIX) injection 80 mg  80 mg IntraVENous BID    atorvastatin (LIPITOR) tablet 80 mg  80 mg Oral QHS    apixaban (ELIQUIS) tablet 2.5 mg  2.5 mg Oral BID    escitalopram oxalate (LEXAPRO) tablet 10 mg  10 mg Oral DAILY    folic acid (FOLVITE) tablet 1 mg  1 mg Oral DAILY    venlafaxine-SR (EFFEXOR-XR) capsule 75 mg  75 mg Oral DAILY    labetaloL (NORMODYNE;TRANDATE) injection 10 mg  10 mg IntraVENous Q2H PRN          LAB AND IMAGING FINDINGS:     Lab Results   Component Value Date/Time    WBC 9.2 02/01/2023 05:38 AM    HGB 7.8 (L) 02/01/2023 05:38 AM    PLATELET 628 07/04/1982 05:38 AM     Lab Results   Component Value Date/Time    Sodium 140 02/01/2023 05:38 AM    Potassium 3.4 (L) 02/01/2023 05:38 AM    Chloride 108 02/01/2023 05:38 AM    CO2 24 02/01/2023 05:38 AM    BUN 47 (H) 02/01/2023 05:38 AM    Creatinine 3.81 (H) 02/01/2023 05:38 AM    Calcium 7.9 (L) 02/01/2023 05:38 AM    Magnesium 2.0 01/29/2023 01:49 PM    Phosphorus 4.4 02/01/2023 05:38 AM      Lab Results   Component Value Date/Time    Alk. phosphatase 157 (H) 01/29/2023 01:49 PM    Protein, total 6.1 (L) 01/29/2023 01:49 PM    Albumin 1.7 (L) 02/01/2023 05:38 AM    Globulin 4.1 (H) 01/29/2023 01:49 PM     Lab Results   Component Value Date/Time    INR 1.1 01/29/2023 01:49 PM    Prothrombin time 11.8 (H) 01/29/2023 01:49 PM      Lab Results   Component Value Date/Time    Iron 29 (L) 01/29/2023 01:49 PM    TIBC 171 (L) 01/29/2023 01:49 PM    Iron % saturation 17 (L) 01/29/2023 01:49 PM    Ferritin 175 01/29/2023 01:49 PM      No results found for: PH, PCO2, PO2  No components found for: GLPOC   No results found for: CPK, CKMB             Total time: 36  minutes  Counseling / coordination time, spent as noted above: 25  minutes  > 50% counseling / coordination?: yes    Prolonged service was provided for  []30 min   []75 min in face to face time in the presence of the patient, spent as noted above.   Time Start:   Time End:   Note: this can only be billed with 72861 (initial) or 38189 (follow up). If multiple start / stop times, list each separately.

## 2023-02-01 NOTE — PROGRESS NOTES
SLP Contact Note    No overt difficulty noted with tolerating PO. Given functional swallow noted on Monday, will sign off. Please ensure medications are given ice cream and pudding.        Thank you,  JAMEY NgoEd, [address] [facility]  Speech-Language Pathologist

## 2023-02-01 NOTE — PROGRESS NOTES
[facility] Adult  Hospitalist Group                                                                                          Hospitalist Progress Note  Vee Madrid MD  Answering service: [phone] from in house phone        Date of Service:  2023  NAME:  Noa Mendes  :  1932  MRN:  [id number]       Admission Summary:   HPI: Abigail Da Silva is a 80 y.o. female from [address] [facility] [address] group home with h/o HTN, afib on Eliquis, CKD, CHF on torsemide, and mostly nonverbal per daughter who p/w increasing generalized edema over the last several days, also lethargy and AMS. Per daughter, she speaks minimally \"she just doesn't want to speak\" (possibly because of a stutter), she sleeps [place] lot,\" doesn't eat/drink much and doesn't like being told what to do. Per daughter, she developed edema about a week ago, but she looked better on Thursday. She had developed facial edema initially which was felt to be d/t her sleeping most of the day with her head down in her wheelchair. No known falls. She has been wheelchair-bound for over a year. The daughter says the facility takes good care of her. Pt in NAD. Denies pain. \"          Interval history / Subjective:   Patient seen examined earlier bedside was placed on 4 L nasal cannula sleeping comfortably. Cr uptrending      Assessment & Plan:     Anasarca  JIM on CKD stage IV > nephrotic syndrome?   HFpEF exacerbation class II  HypoK  Acute metabolic encephalopathy on chronic dementia  Hypertension  History of A. fib currently NSR  Chronic cerebellar infarct  Acute on chronic ACD  Depression  Hyperlipidemia  Generalized deconditioning   -Appreciate cardiology and nephro  -TTE appears stable ef  -DRE neg for hydro  -Unclear why O2 was increased, instructed nurse to wean O2  will repeat chest x-ray noted from admission cxr only a small pleural effusion patient was IV diuretics suspect it will be improved  -per nephro, hold Lasix today creatinine uptrending check bladder scan maintain I's and O's-medication for RRT, nephrology discussed with daughter already she would not want dialysis long term  -ANCAs, complements ordered, patient will need renal biopsy to confirm diagnosis but age and other comorbidities would like to continue with conservative management  - CT head showed age-indeterminate infarct, MRI brain consistent with chronic findings  -ua neg, tsh b12 folate wnl   -FOBT is negative, anemia work-up consistent with ACD likely secondary to renal insufficiency  -replete lytes and follow  - Continue to monitor CBC, transfuse for hemoglobin less than 7  -passed speech eval  -cont Eliquis, statin Continue metoprolol  -New SSRI/SNRI  - PT OT    Critically ill high risk for decompensation. CCT time 30 minutes    Spoke to patient's daughter updated on plan.       Palliative has also been consulted for goals of care, appreciate involvement        Code status: dnr  Prophylaxis: [address] discussed with: patient/family, nurse, case management  Anticipated Disposition:   Inpatient 24- 48 hrs , [facility]  Cardiac monitoring: Telemetry     Hospital Problems  Date Reviewed: 10/1/2021            Codes Class Noted POA    Diastolic [facility] ICD-10-CM: I50.32  ICD-9-CM: 428.32, 428.0  1/30/2023 Unknown        Decreased functional mobility and endurance ICD-10-CM: Z74.09  ICD-9-CM: 780.99  1/30/2023 Unknown        Abnormal CT of brain ICD-10-CM: R90.89  ICD-9-CM: 793.0  1/30/2023 Unknown        CHF exacerbation ([facility] ICD-10-CM: I50.9  ICD-9-CM: 428.0  1/29/2023 Unknown        JIM (acute kidney injury) ([facility] ICD-10-CM: N17.9  ICD-9-CM: 584.9  1/29/2023 Unknown         Social Determinants of Health     Tobacco Use: Low Risk     Smoking Tobacco Use: Never    Smokeless Tobacco Use: Never    Passive Exposure: Not on file   Alcohol Use: Not on file   Financial Resource Strain: Not on file   Food Insecurity: Not on file Transportation Needs: Not on file   Physical Activity: Not on file   Stress: Not on file   Social Connections: Not on file   Intimate Partner Violence: Not on file   Depression: Not on file   Housing Stability: Not on file       Review of Systems:   A comprehensive review of systems was negative except for that written in the HPI. Vital Signs:    Last 24hrs VS reviewed since prior progress note. Most recent are:  Visit Vitals  BP (!) 163/67 (BP 1 Location: Left upper arm, BP Patient Position: At rest)   Pulse 66   Temp 97.6 °F (36.4 °C)   Resp 18   Ht 5' 5\" (1.651 m)   Wt 56.7 kg (125 lb)   SpO2 96%   BMI 20.80 kg/m²       No intake or output data in the 24 hours ending 02/01/23 1226       Physical Examination:     I had a face to face encounter with this patient and independently examined them on 2/1/2023 as outlined below:          Constitutional:  No acute distress, cooperative, pleasant, demented   ENT:  Oral mucosa moist, oropharynx benign. Resp: Diminished breath sounds bilaterally. No wheezing/rhonchi/rales. No accessory muscle use. CV:  Regular rhythm, normal rate, no murmurs, gallops, rubs    GI:  Soft, non distended, non tender. normoactive bowel sounds, no hepatosplenomegaly     Musculoskeletal:  trace edema, warm, 2+ pulses throughout    Neurologic:  Moves all extremities.   AAOx0, CN II-XII reviewed            Data Review:    Review and/or order of clinical lab test  Review and/or order of tests in the radiology section of CPT  Review and/or order of tests in the medicine section of CPT    I have independently reviewed and interpreted patient's lab and all other diagnostic data    Labs:     Recent Labs     02/01/23  0538 01/31/23  0500   WBC 9.2 13.7*   HGB 7.8* 7.8*   HCT 24.9* 24.5*    307       Recent Labs     02/01/23  0538 01/31/23  1330 01/31/23  0500 01/30/23  0708 01/29/23  1349     --  140 140 137   K 3.4*  --  4.0 4.3 5.0     --  108 111* 110*   CO2 24  --  25 21 24   BUN 47*  --  47* 44* 44*   CREA 3.81*  --  3.49* 3.51* 3.61*   GLU 98  --  96 90 99   CA 7.9* 7.8* 8.4* 8.3* 8.1*   MG  --   --   --   --  2.0   PHOS 4.4  --  5.1* 4.8* 4.7       Recent Labs     02/01/23  0538 01/31/23  0500 01/30/23  0708 01/29/23  1349   ALT  --   --   --  19   AP  --   --   --  157*   TBILI  --   --   --  0.3   TP  --   --   --  6.1*   ALB 1.7* 2.1* 2.1* 2.0*   GLOB  --   --   --  4.1*       Recent Labs     01/29/23  1349   INR 1.1   PTP 11.8*        Recent Labs     01/29/23  1349   TIBC 171*   PSAT 17*   FERR 175        Lab Results   Component Value Date/Time    Folate 88.9 (H) 01/29/2023 01:49 PM        No results for input(s): PH, PCO2, PO2 in the last 72 hours. No results for input(s): CPK, CKNDX, TROIQ in the last 72 hours.     No lab exists for component: CPKMB  Lab Results   Component Value Date/Time    Cholesterol, total 298 (H) 04/25/2021 03:01 AM    HDL Cholesterol 38 04/25/2021 03:01 AM    LDL, calculated 211.6 (H) 04/25/2021 03:01 AM    Triglyceride 242 (H) 04/25/2021 03:01 AM    CHOL/HDL Ratio 7.8 (H) 04/25/2021 03:01 AM     Lab Results   Component Value Date/Time    Glucose (POC) 87 01/30/2023 04:19 PM     Lab Results   Component Value Date/Time    Color YELLOW/STRAW 01/29/2023 06:51 PM    Appearance CLEAR 01/29/2023 06:51 PM    Specific gravity 1.011 01/29/2023 06:51 PM    Specific gravity 1.020 04/23/2021 03:44 PM    pH (UA) 7.0 01/29/2023 06:51 PM    Protein 300 (A) 01/29/2023 06:51 PM    Glucose 100 (A) 01/29/2023 06:51 PM    Ketone Negative 01/29/2023 06:51 PM    Bilirubin Negative 01/29/2023 06:51 PM    Urobilinogen 0.2 01/29/2023 06:51 PM    Nitrites Negative 01/29/2023 06:51 PM    Leukocyte Esterase Negative 01/29/2023 06:51 PM    Epithelial cells FEW 01/29/2023 06:51 PM    Bacteria 2+ (A) 01/29/2023 06:51 PM    WBC 0-4 01/29/2023 06:51 PM    RBC 0-5 01/29/2023 06:51 PM       Notes reviewed from all clinical/nonclinical/nursing services involved in patient's clinical care. Care coordination discussions were held with appropriate clinical/nonclinical/ nursing providers based on care coordination needs. Patients current active Medications were reviewed, considered, added and adjusted based on the clinical condition today. Home Medications were reconciled to the best of my ability given all available resources at the time of admission. Route is PO if not otherwise noted.       Medications Reviewed:     Current Facility-Administered Medications   Medication Dose Route Frequency    ergocalciferol capsule 50,000 Units  50,000 Units Oral Q7D    carvediloL (COREG) tablet 12.5 mg  12.5 mg Oral BID WITH MEALS    amLODIPine (NORVASC) tablet 5 mg  5 mg Oral BID    iron sucrose (VENOFER) injection 200 mg  200 mg IntraVENous Q24H    sodium chloride (NS) flush 5-40 mL  5-40 mL IntraVENous Q8H    sodium chloride (NS) flush 5-40 mL  5-40 mL IntraVENous PRN    acetaminophen (TYLENOL) tablet 650 mg  650 mg Oral Q6H PRN    Or    acetaminophen (TYLENOL) suppository 650 mg  650 mg Rectal Q6H PRN    polyethylene glycol (MIRALAX) packet 17 g  17 g Oral DAILY PRN    ondansetron (ZOFRAN ODT) tablet 4 mg  4 mg Oral Q6H PRN    Or    ondansetron (ZOFRAN) injection 4 mg  4 mg IntraVENous Q6H PRN    [Held by provider] furosemide (LASIX) injection 80 mg  80 mg IntraVENous BID    atorvastatin (LIPITOR) tablet 80 mg  80 mg Oral QHS    apixaban (ELIQUIS) tablet 2.5 mg  2.5 mg Oral BID    escitalopram oxalate (LEXAPRO) tablet 10 mg  10 mg Oral DAILY    folic acid (FOLVITE) tablet 1 mg  1 mg Oral DAILY    venlafaxine-SR (EFFEXOR-XR) capsule 75 mg  75 mg Oral DAILY    labetaloL (NORMODYNE;TRANDATE) injection 10 mg  10 mg IntraVENous Q2H PRN     ______________________________________________________________________  EXPECTED LENGTH OF STAY: 3d 21h  ACTUAL LENGTH OF STAY:          3                 Lefty Vergara MD

## 2023-02-02 LAB
ALBUMIN MFR UR ELPH: 61.2 %
ALBUMIN SERPL-MCNC: 1.8 G/DL (ref 3.5–5)
ALPHA1 GLOB MFR UR ELPH: 8.6 %
ALPHA2 GLOB 24H MFR UR ELPH: 7 %
ANION GAP SERPL CALC-SCNC: 6 MMOL/L (ref 5–15)
B-GLOBULIN 24H MFR UR ELPH: 9.1 %
BUN SERPL-MCNC: 46 MG/DL (ref 6–20)
BUN/CREAT SERPL: 12 (ref 12–20)
CALCIUM SERPL-MCNC: 8 MG/DL (ref 8.5–10.1)
CHLORIDE SERPL-SCNC: 108 MMOL/L (ref 97–108)
CO2 SERPL-SCNC: 25 MMOL/L (ref 21–32)
CREAT SERPL-MCNC: 3.88 MG/DL (ref 0.55–1.02)
ERYTHROCYTE [DISTWIDTH] IN BLOOD BY AUTOMATED COUNT: 12.4 % (ref 11.5–14.5)
GAMMA GLOB 24H MFR UR ELPH: 14.1 %
GLUCOSE SERPL-MCNC: 115 MG/DL (ref 65–100)
HBV SURFACE AB SER QL: REACTIVE
HBV SURFACE AB SER-ACNC: >1000 MIU/ML
HCT VFR BLD AUTO: 26.5 % (ref 35–47)
HGB BLD-MCNC: 8 G/DL (ref 11.5–16)
LABORATORY COMMENT REPORT: NORMAL
M PROTEIN MFR UR ELPH: NORMAL %
MAGNESIUM SERPL-MCNC: 1.9 MG/DL (ref 1.6–2.4)
MCH RBC QN AUTO: 28.6 PG (ref 26–34)
MCHC RBC AUTO-ENTMCNC: 30.2 G/DL (ref 30–36.5)
MCV RBC AUTO: 94.6 FL (ref 80–99)
NRBC # BLD: 0 K/UL (ref 0–0.01)
NRBC BLD-RTO: 0 PER 100 WBC
PHOSPHATE SERPL-MCNC: 3.9 MG/DL (ref 2.6–4.7)
PLATELET # BLD AUTO: 320 K/UL (ref 150–400)
PMV BLD AUTO: 10.3 FL (ref 8.9–12.9)
POTASSIUM SERPL-SCNC: 3.9 MMOL/L (ref 3.5–5.1)
PROT UR-MCNC: 1597.1 MG/DL
RBC # BLD AUTO: 2.8 M/UL (ref 3.8–5.2)
SODIUM SERPL-SCNC: 139 MMOL/L (ref 136–145)
WBC # BLD AUTO: 9.2 K/UL (ref 3.6–11)

## 2023-02-02 PROCEDURE — 74011250636 HC RX REV CODE- 250/636: Performed by: INTERNAL MEDICINE

## 2023-02-02 PROCEDURE — 65270000046 HC RM TELEMETRY

## 2023-02-02 PROCEDURE — 86706 HEP B SURFACE ANTIBODY: CPT

## 2023-02-02 PROCEDURE — 83735 ASSAY OF MAGNESIUM: CPT

## 2023-02-02 PROCEDURE — 85027 COMPLETE CBC AUTOMATED: CPT

## 2023-02-02 PROCEDURE — 74011000250 HC RX REV CODE- 250: Performed by: INTERNAL MEDICINE

## 2023-02-02 PROCEDURE — 74011250637 HC RX REV CODE- 250/637: Performed by: INTERNAL MEDICINE

## 2023-02-02 PROCEDURE — 80069 RENAL FUNCTION PANEL: CPT

## 2023-02-02 PROCEDURE — 74011250637 HC RX REV CODE- 250/637: Performed by: STUDENT IN AN ORGANIZED HEALTH CARE EDUCATION/TRAINING PROGRAM

## 2023-02-02 PROCEDURE — 36415 COLL VENOUS BLD VENIPUNCTURE: CPT

## 2023-02-02 PROCEDURE — 99232 SBSQ HOSP IP/OBS MODERATE 35: CPT | Performed by: NURSE PRACTITIONER

## 2023-02-02 RX ORDER — FUROSEMIDE 10 MG/ML
40 INJECTION INTRAMUSCULAR; INTRAVENOUS 2 TIMES DAILY
Status: DISCONTINUED | OUTPATIENT
Start: 2023-02-02 | End: 2023-02-04

## 2023-02-02 RX ADMIN — SODIUM CHLORIDE, PRESERVATIVE FREE 10 ML: 5 INJECTION INTRAVENOUS at 05:51

## 2023-02-02 RX ADMIN — FUROSEMIDE 40 MG: 10 INJECTION, SOLUTION INTRAMUSCULAR; INTRAVENOUS at 18:32

## 2023-02-02 RX ADMIN — IRON SUCROSE 200 MG: 20 INJECTION, SOLUTION INTRAVENOUS at 18:32

## 2023-02-02 RX ADMIN — FOLIC ACID 1 MG: 1 TABLET ORAL at 10:05

## 2023-02-02 RX ADMIN — ATORVASTATIN CALCIUM 80 MG: 20 TABLET, FILM COATED ORAL at 21:05

## 2023-02-02 RX ADMIN — AMLODIPINE BESYLATE 5 MG: 5 TABLET ORAL at 10:05

## 2023-02-02 RX ADMIN — CARVEDILOL 12.5 MG: 12.5 TABLET, FILM COATED ORAL at 10:05

## 2023-02-02 RX ADMIN — ESCITALOPRAM 10 MG: 10 TABLET, FILM COATED ORAL at 10:05

## 2023-02-02 RX ADMIN — APIXABAN 2.5 MG: 2.5 TABLET, FILM COATED ORAL at 10:05

## 2023-02-02 RX ADMIN — CARVEDILOL 12.5 MG: 12.5 TABLET, FILM COATED ORAL at 18:32

## 2023-02-02 RX ADMIN — AMLODIPINE BESYLATE 5 MG: 5 TABLET ORAL at 18:32

## 2023-02-02 RX ADMIN — VENLAFAXINE HYDROCHLORIDE 75 MG: 37.5 CAPSULE, EXTENDED RELEASE ORAL at 10:05

## 2023-02-02 RX ADMIN — FUROSEMIDE 40 MG: 10 INJECTION, SOLUTION INTRAMUSCULAR; INTRAVENOUS at 12:43

## 2023-02-02 RX ADMIN — SODIUM CHLORIDE, PRESERVATIVE FREE 10 ML: 5 INJECTION INTRAVENOUS at 21:06

## 2023-02-02 RX ADMIN — APIXABAN 2.5 MG: 2.5 TABLET, FILM COATED ORAL at 18:32

## 2023-02-02 NOTE — PROGRESS NOTES
[facility]  Hospitalist Group                                                                                          Hospitalist Progress Note  Isaiah Vaz MD  Answering service: [phone] from in house phone        Date of Service:  2023  NAME:  Annie Martin  :  1932  MRN:  [id number]       Admission Summary:   HPI: Nancy Britt is a 80 y.o. female from [facility] [address] intermediate with h/o HTN, afib on Eliquis, CKD, CHF on torsemide, and mostly nonverbal per daughter who p/w increasing generalized edema over the last several days, also lethargy and AMS. Per daughter, she speaks minimally \"she just doesn't want to speak\" (possibly because of a stutter), she sleeps [place] lot,\" doesn't eat/drink much and doesn't like being told what to do. Per daughter, she developed edema about a week ago, but she looked better on Thursday. She had developed facial edema initially which was felt to be d/t her sleeping most of the day with her head down in her wheelchair. No known falls. She has been wheelchair-bound for over a year. The daughter says the facility takes good care of her. Pt in NAD. Denies pain. \"          Interval history / Subjective:   Patient seen examined earlier bedside, on NC. Denies any acute concerns or complaints this morning. Attempted to call patient's daughter, no answer. Will try again. Assessment & Plan:     Anasarca  JIM on CKD stage IV > nephrotic syndrome?   HFpEF exacerbation class II  HypoK  Hypertension  -TTE appears stable ef  -DRE neg for hydro  -Diuresis per nephrology, restarting lasix at lower dose today due to volume overload on CXR  -Per notes, family does not want dialysis, but feels patient is not ready for biopsy at this time   -ANCAs, complements ordered, patient will need renal biopsy to confirm diagnosis but age and other comorbidities would like to continue with conservative management  -replete lytes and follow  -passed speech eval    Acute metabolic encephalopathy on chronic dementia  - CT head showed age-indeterminate infarct, MRI brain consistent with chronic findings  - UA neg, tsh b12 folate wnl     History of A. fib currently NSR  Chronic cerebellar infarct  -cont Eliquis, statin Continue metoprolol    Anemia of Chronic Disease  -FOBT is negative, anemia work-up consistent with ACD likely secondary to renal insufficiency  -Continue to monitor CBC, transfuse for hemoglobin less than 7    Depression -New SSRI/SNRI  Hyperlipidemia  Generalized deconditioning, pt bedbound at baseline - PT OT    Critically ill high risk for decompensation.   CCT time 30 minutes    Palliative has also been consulted for goals of care, appreciate involvement        Code status: dnr  Prophylaxis: Eliquis  Care Plan discussed with: patient/family, nurse, case management  Anticipated Disposition:   Inpatient >48 hrs , likely return to [facility] when ready  Cardiac monitoring: Telemetry     Hospital Problems  Date Reviewed: 10/1/2021            Codes Class Noted POA    Diastolic CHF, chronic ([facility] ICD-10-CM: I50.32  ICD-9-CM: 428.32, 428.0  1/30/2023 Unknown        Decreased functional mobility and endurance ICD-10-CM: Z74.09  ICD-9-CM: 780.99  1/30/2023 Unknown        Abnormal CT of brain ICD-10-CM: R90.89  ICD-9-CM: 793.0  1/30/2023 Unknown        CHF exacerbation ([facility] ICD-10-CM: I50.9  ICD-9-CM: 428.0  1/29/2023 Unknown        JIM (acute kidney injury) ([facility] ICD-10-CM: N17.9  ICD-9-CM: 584.9  1/29/2023 Unknown         Social Determinants of Health     Tobacco Use: Low Risk     Smoking Tobacco Use: Never    Smokeless Tobacco Use: Never    Passive Exposure: Not on file   Alcohol Use: Not on file   Financial Resource Strain: Not on file   Food Insecurity: Not on file   Transportation Needs: Not on file   Physical Activity: Not on file   Stress: Not on file   Social Connections: Not on file   Intimate Partner Violence: Not on file   Depression: Not on file   Housing Stability: Not on file       Review of Systems:   A comprehensive review of systems was negative except for that written in the HPI. Vital Signs:    Last 24hrs VS reviewed since prior progress note. Most recent are:  Visit Vitals  BP (!) 153/64 (BP 1 Location: Left upper arm, BP Patient Position: At rest)   Pulse 71   Temp 98.3 °F (36.8 °C)   Resp 16   Ht 5' 5\" (1.651 m)   Wt 56.7 kg (125 lb)   SpO2 95%   BMI 20.80 kg/m²         Intake/Output Summary (Last 24 hours) at 2/2/2023 1350  Last data filed at 2/1/2023 1600  Gross per 24 hour   Intake --   Output 250 ml   Net -250 ml        Physical Examination:     I had a face to face encounter with this patient and independently examined them on 2/2/2023 as outlined below:          Constitutional:  No acute distress, cooperative, pleasant, demented   ENT:  Oral mucosa moist, oropharynx benign. Resp:  Diminished breath sounds bilaterally. No wheezing/rhonchi/rales. No accessory muscle use. CV:  Regular rhythm, normal rate, no murmurs, gallops, rubs    GI:  Soft, non distended, non tender. normoactive bowel sounds, no hepatosplenomegaly     Musculoskeletal:  Trace edema, warm, 2+ pulses throughout    Neurologic:  Moves all extremities.   AAOx0, CN II-XII reviewed            Data Review:    Review and/or order of clinical lab test  Review and/or order of tests in the radiology section of CPT  Review and/or order of tests in the medicine section of CPT    I have independently reviewed and interpreted patient's lab and all other diagnostic data    Labs:     Recent Labs     02/02/23  1015 02/01/23  0538   WBC 9.2 9.2   HGB 8.0* 7.8*   HCT 26.5* 24.9*    273     Recent Labs     02/02/23  1015 02/01/23  0538 01/31/23  1330 01/31/23  0500    140  --  140   K 3.9 3.4*  --  4.0    108  --  108   CO2 25 24  --  25   BUN 46* 47*  --  47*   CREA 3.88* 3.81*  --  3.49*   * 98  --  96   CA 8.0* 7.9* 7.8* 8.4*   MG 1.9  --   --   --    PHOS 3.9 4.4 --  5.1*     Recent Labs     02/02/23  1015 02/01/23  0538 01/31/23  0500   ALB 1.8* 1.7* 2.1*     No results for input(s): INR, PTP, APTT, INREXT, INREXT in the last 72 hours. No results for input(s): FE, TIBC, PSAT, FERR in the last 72 hours. Lab Results   Component Value Date/Time    Folate 88.9 (H) 01/29/2023 01:49 PM        No results for input(s): PH, PCO2, PO2 in the last 72 hours. No results for input(s): CPK, CKNDX, TROIQ in the last 72 hours. No lab exists for component: CPKMB  Lab Results   Component Value Date/Time    Cholesterol, total 298 (H) 04/25/2021 03:01 AM    HDL Cholesterol 38 04/25/2021 03:01 AM    LDL, calculated 211.6 (H) 04/25/2021 03:01 AM    Triglyceride 242 (H) 04/25/2021 03:01 AM    CHOL/HDL Ratio 7.8 (H) 04/25/2021 03:01 AM     Lab Results   Component Value Date/Time    Glucose (POC) 87 01/30/2023 04:19 PM     Lab Results   Component Value Date/Time    Color YELLOW/STRAW 01/29/2023 06:51 PM    Appearance CLEAR 01/29/2023 06:51 PM    Specific gravity 1.011 01/29/2023 06:51 PM    Specific gravity 1.020 04/23/2021 03:44 PM    pH (UA) 7.0 01/29/2023 06:51 PM    Protein 300 (A) 01/29/2023 06:51 PM    Glucose 100 (A) 01/29/2023 06:51 PM    Ketone Negative 01/29/2023 06:51 PM    Bilirubin Negative 01/29/2023 06:51 PM    Urobilinogen 0.2 01/29/2023 06:51 PM    Nitrites Negative 01/29/2023 06:51 PM    Leukocyte Esterase Negative 01/29/2023 06:51 PM    Epithelial cells FEW 01/29/2023 06:51 PM    Bacteria 2+ (A) 01/29/2023 06:51 PM    WBC 0-4 01/29/2023 06:51 PM    RBC 0-5 01/29/2023 06:51 PM       Notes reviewed from all clinical/nonclinical/nursing services involved in patient's clinical care. Care coordination discussions were held with appropriate clinical/nonclinical/ nursing providers based on care coordination needs. Patients current active Medications were reviewed, considered, added and adjusted based on the clinical condition today.       Home Medications were reconciled to the best of my ability given all available resources at the time of admission. Route is PO if not otherwise noted.       Medications Reviewed:     Current Facility-Administered Medications   Medication Dose Route Frequency    furosemide (LASIX) injection 40 mg  40 mg IntraVENous BID    ergocalciferol capsule 50,000 Units  50,000 Units Oral Q7D    carvediloL (COREG) tablet 12.5 mg  12.5 mg Oral BID WITH MEALS    amLODIPine (NORVASC) tablet 5 mg  5 mg Oral BID    iron sucrose (VENOFER) injection 200 mg  200 mg IntraVENous Q24H    sodium chloride (NS) flush 5-40 mL  5-40 mL IntraVENous Q8H    sodium chloride (NS) flush 5-40 mL  5-40 mL IntraVENous PRN    acetaminophen (TYLENOL) tablet 650 mg  650 mg Oral Q6H PRN    Or    acetaminophen (TYLENOL) suppository 650 mg  650 mg Rectal Q6H PRN    polyethylene glycol (MIRALAX) packet 17 g  17 g Oral DAILY PRN    ondansetron (ZOFRAN ODT) tablet 4 mg  4 mg Oral Q6H PRN    Or    ondansetron (ZOFRAN) injection 4 mg  4 mg IntraVENous Q6H PRN    atorvastatin (LIPITOR) tablet 80 mg  80 mg Oral QHS    apixaban (ELIQUIS) tablet 2.5 mg  2.5 mg Oral BID    escitalopram oxalate (LEXAPRO) tablet 10 mg  10 mg Oral DAILY    folic acid (FOLVITE) tablet 1 mg  1 mg Oral DAILY    venlafaxine-SR (EFFEXOR-XR) capsule 75 mg  75 mg Oral DAILY    labetaloL (NORMODYNE;TRANDATE) injection 10 mg  10 mg IntraVENous Q2H PRN     ______________________________________________________________________  EXPECTED LENGTH OF STAY: 3d 21h  ACTUAL LENGTH OF STAY:          4                 Bibiana Sanchez MD

## 2023-02-02 NOTE — PROGRESS NOTES
Transition of Care Plan  RUR- 19% Moderate Risk  DISPOSITION: The disposition plan is home with family assistance. Patient resides at the [facility] and will return once medically stable for discharge. F/U with PCP/Specialist    Transport: [facility] - phone [phone], scheduled for     [facility]  Phone Number: [phone]  Address: [address] .  Room Number: Room 116    At 4:48pm - CM completed chart review, per previous CM note, patient admitted from [address] [place] [address]. Patient would like to return to facility once medically stable. CM to assist with LACHO needs as they arise.     Lalito Barragan, MSW, LTSS, LMHP-e  Available in [employer]

## 2023-02-02 NOTE — PROGRESS NOTES
Renal Progress Note    NAME:  Vashti Dandy   :   1932   MRN:   [id number]     Date/Time:  2023 9:36 AM  Subjective:       23-patient seen and examined, sleeping, arousable, denies SONB. On 5 lit O2 this am. Cr higher, no urine output recorded. Has small amount of urine in purewick container    23-Patient awake and alert, feels ok, denies SOB. On 2 lit O2.  No labs today    Review of Systems:  Medications reviewed:  Current Facility-Administered Medications   Medication Dose Route Frequency    ergocalciferol capsule 50,000 Units  50,000 Units Oral Q7D    carvediloL (COREG) tablet 12.5 mg  12.5 mg Oral BID WITH MEALS    amLODIPine (NORVASC) tablet 5 mg  5 mg Oral BID    iron sucrose (VENOFER) injection 200 mg  200 mg IntraVENous Q24H    sodium chloride (NS) flush 5-40 mL  5-40 mL IntraVENous Q8H    sodium chloride (NS) flush 5-40 mL  5-40 mL IntraVENous PRN    acetaminophen (TYLENOL) tablet 650 mg  650 mg Oral Q6H PRN    Or    acetaminophen (TYLENOL) suppository 650 mg  650 mg Rectal Q6H PRN    polyethylene glycol (MIRALAX) packet 17 g  17 g Oral DAILY PRN    ondansetron (ZOFRAN ODT) tablet 4 mg  4 mg Oral Q6H PRN    Or    ondansetron (ZOFRAN) injection 4 mg  4 mg IntraVENous Q6H PRN    [Held by provider] furosemide (LASIX) injection 80 mg  80 mg IntraVENous BID    atorvastatin (LIPITOR) tablet 80 mg  80 mg Oral QHS    apixaban (ELIQUIS) tablet 2.5 mg  2.5 mg Oral BID    escitalopram oxalate (LEXAPRO) tablet 10 mg  10 mg Oral DAILY    folic acid (FOLVITE) tablet 1 mg  1 mg Oral DAILY    venlafaxine-SR (EFFEXOR-XR) capsule 75 mg  75 mg Oral DAILY    labetaloL (NORMODYNE;TRANDATE) injection 10 mg  10 mg IntraVENous Q2H PRN        Objective:   Vitals:  Visit Vitals  BP (!) 174/60 (BP 1 Location: Left upper arm, BP Patient Position: At rest)   Pulse 71   Temp 98.2 °F (36.8 °C)   Resp 16   Ht 5' 5\" (1.651 m)   Wt 56.7 kg (125 lb)   SpO2 96%   BMI 20.80 kg/m²     Temp (24hrs), Av.1 °F (36.7 °C), Min:97.4 °F (36.3 °C), Max:98.8 °F (37.1 °C)    O2 Flow Rate (L/min): 2 l/min O2 Device: Nasal cannula    Last 24hr Input/Output:    Intake/Output Summary (Last 24 hours) at 2/2/2023 1015  Last data filed at 2/1/2023 1600  Gross per 24 hour   Intake --   Output 250 ml   Net -250 ml          Physical Exam:  General: awake and alert, No distress,   HEENT: Conjunctiva without pallor ,erythema. The sclerae without icterus. .   Neck:Supple, JVD+  Lungs : decrease breathing sounds  Bibasilar, Normal respiratory effort  CVS: RRR, S1 S2 normal, No rub, 1+ LE edema  Abdomen: Soft, Non tender, No hepatosplenomegaly, bowel sounds present  Extremities: edema improving  Skin: No rash   Neurologic: nonfocal , nl speech  Psych: normal affect       [] Telemetry Reviewed     [] NSR [] PAC/PVCs   [] Afib  [] Paced   [] NSVT   [] Funes [] NGT  [] Intubated on vent    Lab Data Reviewed:    No results found for this or any previous visit (from the past 24 hour(s)). Assessment/Plan:   Active Problems:    CHF exacerbation ([facility] (1/29/2023)      JIM (acute kidney injury) ([facility] (5/55/0645)      Diastolic CHF, chronic (HCC) (1/30/2023)      Decreased functional mobility and endurance (1/30/2023)      Abnormal CT of brain (1/30/2023)       JIM on CKD4 versus progression of CKD4-most likely progression of her CKD  -DRE no hydro, NO MRD? Nl size kidneys  -no prior nephrology w/up. Has nephrotic syndrome, had proteinuria in 2021 also. UA bland  -no need for RRT, family does not want dialysis  -avoid NSAIDs and Iv contrast  -check bladder scan  -Is and Os  -CXR with increase pulm edema and lt pleural effuson  -restart furosemide and decrease to 40 mg IV bid  -f/up labs today     Nephrotic syndrome,   --Ddx includes MGN, amyloidosis, Multiple myeloma, no h/o DM, TIN, etc  -f/up  serology work up, A1C  -Needs renal biopsy but Given her age and comorbidities recommend conservative management.  Daughter also is not interested in the aggressive  management including renal biopsy and dialysis  -monitor renal function. -MED, ANCA neg, complement nl, A1C 4.8.   SPEP and the rest of serology pending  -HBsAg neg, HBcore Ab Ig M positive , checkHBsAb    Hypokalemia, repleted    Secondary hyperpara  -, vit D 20, start vit D     Uncontrolled HTN  -increased amlodipine to 5 mg bid     Acute on chronic HFpEF  --CXR with increase pulm edema and lt pleural effuson  -restart furosemide and decrease to 40 mg IV bid     Anemia of CKD  -low iron 17%, started IV iron     HLD  ___________________________________________________    Total time spent with patient:  [] 15   [] 25   [] 35   []  __ minutes    [] Critical Care Provided    Care Plan discussed with:    [] Patient   [] Family    [] Care Manager   [] Consultant/Specialist :      []   >50% of visit spent in counseling and coordination of care   (Discussed [] CODE status,  [] Care Plan, [] D/C Planning)    ___________________________________________________    Attending Physician: MD Nuno Maki

## 2023-02-03 ENCOUNTER — APPOINTMENT (OUTPATIENT)
Dept: GENERAL RADIOLOGY | Age: 88
DRG: 698 | End: 2023-02-03
Attending: STUDENT IN AN ORGANIZED HEALTH CARE EDUCATION/TRAINING PROGRAM
Payer: MEDICARE

## 2023-02-03 LAB
ALBUMIN SERPL ELPH-MCNC: 2.3 G/DL (ref 2.9–4.4)
ALBUMIN SERPL-MCNC: 1.8 G/DL (ref 3.5–5)
ALBUMIN/GLOB SERPL: 0.9 (ref 0.7–1.7)
ALPHA1 GLOB SERPL ELPH-MCNC: 0.3 G/DL (ref 0–0.4)
ALPHA2 GLOB SERPL ELPH-MCNC: 1 G/DL (ref 0.4–1)
ANION GAP SERPL CALC-SCNC: 6 MMOL/L (ref 5–15)
B-GLOBULIN SERPL ELPH-MCNC: 0.8 G/DL (ref 0.7–1.3)
BACTERIA SPEC CULT: NORMAL
BASOPHILS # BLD: 0.1 K/UL (ref 0–0.1)
BASOPHILS NFR BLD: 1 % (ref 0–1)
BUN SERPL-MCNC: 42 MG/DL (ref 6–20)
BUN/CREAT SERPL: 11 (ref 12–20)
CALCIUM SERPL-MCNC: 7.8 MG/DL (ref 8.5–10.1)
CHLORIDE SERPL-SCNC: 108 MMOL/L (ref 97–108)
CO2 SERPL-SCNC: 25 MMOL/L (ref 21–32)
CREAT SERPL-MCNC: 3.82 MG/DL (ref 0.55–1.02)
DIFFERENTIAL METHOD BLD: ABNORMAL
EOSINOPHIL # BLD: 0.3 K/UL (ref 0–0.4)
EOSINOPHIL NFR BLD: 3 % (ref 0–7)
ERYTHROCYTE [DISTWIDTH] IN BLOOD BY AUTOMATED COUNT: 12.3 % (ref 11.5–14.5)
GAMMA GLOB SERPL ELPH-MCNC: 0.5 G/DL (ref 0.4–1.8)
GLOBULIN SER-MCNC: 2.6 G/DL (ref 2.2–3.9)
GLUCOSE SERPL-MCNC: 97 MG/DL (ref 65–100)
HCT VFR BLD AUTO: 24.4 % (ref 35–47)
HGB BLD-MCNC: 7.9 G/DL (ref 11.5–16)
IGA SERPL-MCNC: 368 MG/DL (ref 64–422)
IGG SERPL-MCNC: 641 MG/DL (ref 586–1602)
IGM SERPL-MCNC: 62 MG/DL (ref 26–217)
IMM GRANULOCYTES # BLD AUTO: 0 K/UL (ref 0–0.04)
IMM GRANULOCYTES NFR BLD AUTO: 0 % (ref 0–0.5)
INTERPRETATION SERPL IEP-IMP: ABNORMAL
KAPPA LC FREE SER-MCNC: 85.5 MG/L (ref 3.3–19.4)
KAPPA LC FREE/LAMBDA FREE SER: 1.38 (ref 0.26–1.65)
LAMBDA LC FREE SERPL-MCNC: 62.1 MG/L (ref 5.7–26.3)
LYMPHOCYTES # BLD: 1.5 K/UL (ref 0.8–3.5)
LYMPHOCYTES NFR BLD: 15 % (ref 12–49)
M PROTEIN SERPL ELPH-MCNC: ABNORMAL G/DL
MCH RBC QN AUTO: 29.9 PG (ref 26–34)
MCHC RBC AUTO-ENTMCNC: 32.4 G/DL (ref 30–36.5)
MCV RBC AUTO: 92.4 FL (ref 80–99)
MONOCYTES # BLD: 1 K/UL (ref 0–1)
MONOCYTES NFR BLD: 10 % (ref 5–13)
NEUTS SEG # BLD: 7.3 K/UL (ref 1.8–8)
NEUTS SEG NFR BLD: 71 % (ref 32–75)
NRBC # BLD: 0 K/UL (ref 0–0.01)
NRBC BLD-RTO: 0 PER 100 WBC
PHOSPHATE SERPL-MCNC: 4 MG/DL (ref 2.6–4.7)
PLATELET # BLD AUTO: 318 K/UL (ref 150–400)
PMV BLD AUTO: 10.2 FL (ref 8.9–12.9)
POTASSIUM SERPL-SCNC: 3.9 MMOL/L (ref 3.5–5.1)
PROT SERPL-MCNC: 4.9 G/DL (ref 6–8.5)
RBC # BLD AUTO: 2.64 M/UL (ref 3.8–5.2)
SERVICE CMNT-IMP: NORMAL
SODIUM SERPL-SCNC: 139 MMOL/L (ref 136–145)
WBC # BLD AUTO: 10.2 K/UL (ref 3.6–11)

## 2023-02-03 PROCEDURE — 71045 X-RAY EXAM CHEST 1 VIEW: CPT

## 2023-02-03 PROCEDURE — 74011250637 HC RX REV CODE- 250/637: Performed by: INTERNAL MEDICINE

## 2023-02-03 PROCEDURE — 74011250636 HC RX REV CODE- 250/636: Performed by: INTERNAL MEDICINE

## 2023-02-03 PROCEDURE — 36415 COLL VENOUS BLD VENIPUNCTURE: CPT

## 2023-02-03 PROCEDURE — 74011000250 HC RX REV CODE- 250: Performed by: INTERNAL MEDICINE

## 2023-02-03 PROCEDURE — 74011250637 HC RX REV CODE- 250/637: Performed by: STUDENT IN AN ORGANIZED HEALTH CARE EDUCATION/TRAINING PROGRAM

## 2023-02-03 PROCEDURE — 65270000046 HC RM TELEMETRY

## 2023-02-03 PROCEDURE — 80069 RENAL FUNCTION PANEL: CPT

## 2023-02-03 PROCEDURE — 85025 COMPLETE CBC W/AUTO DIFF WBC: CPT

## 2023-02-03 PROCEDURE — 87517 HEPATITIS B DNA QUANT: CPT

## 2023-02-03 RX ADMIN — ATORVASTATIN CALCIUM 80 MG: 20 TABLET, FILM COATED ORAL at 21:15

## 2023-02-03 RX ADMIN — IRON SUCROSE 200 MG: 20 INJECTION, SOLUTION INTRAVENOUS at 16:34

## 2023-02-03 RX ADMIN — FUROSEMIDE 40 MG: 10 INJECTION, SOLUTION INTRAMUSCULAR; INTRAVENOUS at 10:29

## 2023-02-03 RX ADMIN — SODIUM CHLORIDE, PRESERVATIVE FREE 10 ML: 5 INJECTION INTRAVENOUS at 16:36

## 2023-02-03 RX ADMIN — ESCITALOPRAM 10 MG: 10 TABLET, FILM COATED ORAL at 10:29

## 2023-02-03 RX ADMIN — APIXABAN 2.5 MG: 2.5 TABLET, FILM COATED ORAL at 17:52

## 2023-02-03 RX ADMIN — VENLAFAXINE HYDROCHLORIDE 75 MG: 37.5 CAPSULE, EXTENDED RELEASE ORAL at 10:28

## 2023-02-03 RX ADMIN — AMLODIPINE BESYLATE 5 MG: 5 TABLET ORAL at 10:29

## 2023-02-03 RX ADMIN — FUROSEMIDE 40 MG: 10 INJECTION, SOLUTION INTRAMUSCULAR; INTRAVENOUS at 17:52

## 2023-02-03 RX ADMIN — CARVEDILOL 12.5 MG: 12.5 TABLET, FILM COATED ORAL at 10:28

## 2023-02-03 RX ADMIN — SODIUM CHLORIDE, PRESERVATIVE FREE 10 ML: 5 INJECTION INTRAVENOUS at 21:14

## 2023-02-03 RX ADMIN — APIXABAN 2.5 MG: 2.5 TABLET, FILM COATED ORAL at 10:28

## 2023-02-03 RX ADMIN — SODIUM CHLORIDE, PRESERVATIVE FREE 10 ML: 5 INJECTION INTRAVENOUS at 05:58

## 2023-02-03 RX ADMIN — CARVEDILOL 12.5 MG: 12.5 TABLET, FILM COATED ORAL at 16:34

## 2023-02-03 RX ADMIN — FOLIC ACID 1 MG: 1 TABLET ORAL at 10:28

## 2023-02-03 RX ADMIN — AMLODIPINE BESYLATE 5 MG: 5 TABLET ORAL at 17:53

## 2023-02-03 NOTE — PROGRESS NOTES
Problem: Pressure Injury - Risk of  Goal: *Prevention of pressure injury  Description: Document Gigi Scale and appropriate interventions in the flowsheet. Outcome: Progressing Towards Goal  Note: Pressure Injury Interventions:  Sensory Interventions: Discuss PT/OT consult with provider, Keep linens dry and wrinkle-free    Moisture Interventions: Absorbent underpads, Check for incontinence Q2 hours and as needed, Internal/External urinary devices    Activity Interventions: Pressure redistribution bed/mattress(bed type), PT/OT evaluation    Mobility Interventions: PT/OT evaluation, Turn and reposition approx.  every two hours(pillow and wedges)    Nutrition Interventions: Document food/fluid/supplement intake    Friction and Shear Interventions: Apply protective barrier, creams and emollients, Lift sheet

## 2023-02-03 NOTE — PROGRESS NOTES
[address] [facility] Adult  Hospitalist Group                                                                                          Hospitalist Progress Note  Gilda Duarte MD  Answering service: [phone] from in house phone        Date of Service:  2/3/2023  NAME:  Adelina Spicer  :  1932  MRN:  [id number]       Admission Summary:   HPI: Timur Gould is a 80 y.o. female from [address] [facility] [address] [facility] with h/o HTN, afib on Eliquis, CKD, CHF on torsemide, and mostly nonverbal per daughter who p/w increasing generalized edema over the last several days, also lethargy and AMS. Per daughter, she speaks minimally \"she just doesn't want to speak\" (possibly because of a stutter), she sleeps [place] lot,\" doesn't eat/drink much and doesn't like being told what to do. Per daughter, she developed edema about a week ago, but she looked better on Thursday. She had developed facial edema initially which was felt to be d/t her sleeping most of the day with her head down in her wheelchair. No known falls. She has been wheelchair-bound for over a year. The daughter says the facility takes good care of her. Pt in NAD. Denies pain. \"          Interval history / Subjective:   Patient seen examined earlier bedside, on NC. Denies any acute concerns or complaints this morning. Patient's daughter at beside, updates given and questions answered. Assessment & Plan:     Anasarca  JIM on CKD stage IV > nephrotic syndrome?   HFpEF exacerbation class II  HypoK  Hypertension  Acute hypoxic respiratory failure secondary to pleural edema   - Requires gentle diuresis due to JIM on CKD  -TTE appears stable ef  -DRE neg for hydro  -Per notes, family does not want dialysis, but feels patient is not ready for biopsy at this time   -ANCAs, complements ordered, patient will need renal biopsy to confirm diagnosis but age and other comorbidities would like to continue with conservative management  -replete lytes and follow  -Diuresis per nephrology, continue lasix IV 40mg BID     Acute metabolic encephalopathy on chronic dementia  - CT head showed age-indeterminate infarct, MRI brain consistent with chronic findings  - UA neg, tsh b12 folate wnl   - Passed swallow eval     History of A. fib currently NSR  Chronic cerebellar infarct  -cont Eliquis, statin Continue metoprolol    Anemia of Chronic Disease  -FOBT is negative, anemia work-up consistent with ACD likely secondary to renal insufficiency  -Continue to monitor CBC, transfuse for hemoglobin less than 7    Depression -New SSRI/SNRI  Hyperlipidemia - statin   Generalized deconditioning, pt bedbound at baseline - PT OT    Critically ill high risk for decompensation.   CCT time 30 minutes    Palliative has also been consulted for goals of care, appreciate involvement        Code status: dnr  Prophylaxis: Eliquis  Care Plan discussed with: patient/family, nurse, case management  Anticipated Disposition:   Inpatient >48 hrs , likely return to [facility] when ready  Cardiac monitoring: Telemetry     Hospital Problems  Date Reviewed: 10/1/2021            Codes Class Noted POA    Diastolic CHF, chronic ([facility] ICD-10-CM: I50.32  ICD-9-CM: 428.32, 428.0  1/30/2023 Unknown        Decreased functional mobility and endurance ICD-10-CM: Z74.09  ICD-9-CM: 780.99  1/30/2023 Unknown        Abnormal CT of brain ICD-10-CM: R90.89  ICD-9-CM: 793.0  1/30/2023 Unknown        CHF exacerbation ([facility] ICD-10-CM: I50.9  ICD-9-CM: 428.0  1/29/2023 Unknown        JIM (acute kidney injury) ([facility] ICD-10-CM: N17.9  ICD-9-CM: 584.9  1/29/2023 Unknown         Social Determinants of Health     Tobacco Use: Low Risk     Smoking Tobacco Use: Never    Smokeless Tobacco Use: Never    Passive Exposure: Not on file   Alcohol Use: Not on file   Financial Resource Strain: Not on file   Food Insecurity: Not on file   Transportation Needs: Not on file   Physical Activity: Not on file   Stress: Not on file   Social Connections: Not on file   Intimate Partner Violence: Not on file   Depression: Not on file   Housing Stability: Not on file       Review of Systems:   A comprehensive review of systems was negative except for that written in the HPI. Vital Signs:    Last 24hrs VS reviewed since prior progress note. Most recent are:  Visit Vitals  BP (!) 184/70 (BP 1 Location: Right upper arm, BP Patient Position: At rest;Lying)   Pulse 67   Temp 97.2 °F (36.2 °C)   Resp 16   Ht 5' 5\" (1.651 m)   Wt 56.7 kg (125 lb)   SpO2 96%   BMI 20.80 kg/m²         Intake/Output Summary (Last 24 hours) at 2/3/2023 1733  Last data filed at 2/3/2023 0000  Gross per 24 hour   Intake --   Output 650 ml   Net -650 ml        Physical Examination:     I had a face to face encounter with this patient and independently examined them on 2/3/2023 as outlined below:          Constitutional:  No acute distress, cooperative, pleasant, demented   ENT:  Oral mucosa moist, oropharynx benign. Resp:  Diminished breath sounds bilaterally. No wheezing/rhonchi/rales. No accessory muscle use. CV:  Regular rhythm, normal rate, no murmurs, gallops, rubs    GI:  Soft, non distended, non tender. normoactive bowel sounds, no hepatosplenomegaly     Musculoskeletal:  Trace edema, warm, 2+ pulses throughout    Neurologic:  Moves all extremities.   AAOx0, CN II-XII reviewed            Data Review:    Review and/or order of clinical lab test  Review and/or order of tests in the radiology section of CPT  Review and/or order of tests in the medicine section of CPT    I have independently reviewed and interpreted patient's lab and all other diagnostic data    Labs:     Recent Labs     02/03/23  0508 02/02/23  1015   WBC 10.2 9.2   HGB 7.9* 8.0*   HCT 24.4* 26.5*    320     Recent Labs     02/03/23  0508 02/02/23  1015 02/01/23  0538    139 140   K 3.9 3.9 3.4*    108 108   CO2 25 25 24   BUN 42* 46* 47*   CREA 3.82* 3.88* 3.81*   GLU 97 115* 98   CA 7. 8* 8.0* 7.9*   MG  --  1.9  --    PHOS 4.0 3.9 4.4     Recent Labs     02/03/23  0508 02/02/23  1015 02/01/23  0538   ALB 1.8* 1.8* 1.7*     No results for input(s): INR, PTP, APTT, INREXT, INREXT in the last 72 hours. No results for input(s): FE, TIBC, PSAT, FERR in the last 72 hours. Lab Results   Component Value Date/Time    Folate 88.9 (H) 01/29/2023 01:49 PM        No results for input(s): PH, PCO2, PO2 in the last 72 hours. No results for input(s): CPK, CKNDX, TROIQ in the last 72 hours. No lab exists for component: CPKMB  Lab Results   Component Value Date/Time    Cholesterol, total 298 (H) 04/25/2021 03:01 AM    HDL Cholesterol 38 04/25/2021 03:01 AM    LDL, calculated 211.6 (H) 04/25/2021 03:01 AM    Triglyceride 242 (H) 04/25/2021 03:01 AM    CHOL/HDL Ratio 7.8 (H) 04/25/2021 03:01 AM     Lab Results   Component Value Date/Time    Glucose (POC) 87 01/30/2023 04:19 PM     Lab Results   Component Value Date/Time    Color YELLOW/STRAW 01/29/2023 06:51 PM    Appearance CLEAR 01/29/2023 06:51 PM    Specific gravity 1.011 01/29/2023 06:51 PM    Specific gravity 1.020 04/23/2021 03:44 PM    pH (UA) 7.0 01/29/2023 06:51 PM    Protein 300 (A) 01/29/2023 06:51 PM    Glucose 100 (A) 01/29/2023 06:51 PM    Ketone Negative 01/29/2023 06:51 PM    Bilirubin Negative 01/29/2023 06:51 PM    Urobilinogen 0.2 01/29/2023 06:51 PM    Nitrites Negative 01/29/2023 06:51 PM    Leukocyte Esterase Negative 01/29/2023 06:51 PM    Epithelial cells FEW 01/29/2023 06:51 PM    Bacteria 2+ (A) 01/29/2023 06:51 PM    WBC 0-4 01/29/2023 06:51 PM    RBC 0-5 01/29/2023 06:51 PM       Notes reviewed from all clinical/nonclinical/nursing services involved in patient's clinical care. Care coordination discussions were held with appropriate clinical/nonclinical/ nursing providers based on care coordination needs.          Patients current active Medications were reviewed, considered, added and adjusted based on the clinical condition today.      Home Medications were reconciled to the best of my ability given all available resources at the time of admission. Route is PO if not otherwise noted.       Medications Reviewed:     Current Facility-Administered Medications   Medication Dose Route Frequency    furosemide (LASIX) injection 40 mg  40 mg IntraVENous BID    ergocalciferol capsule 50,000 Units  50,000 Units Oral Q7D    carvediloL (COREG) tablet 12.5 mg  12.5 mg Oral BID WITH MEALS    amLODIPine (NORVASC) tablet 5 mg  5 mg Oral BID    iron sucrose (VENOFER) injection 200 mg  200 mg IntraVENous Q24H    sodium chloride (NS) flush 5-40 mL  5-40 mL IntraVENous Q8H    sodium chloride (NS) flush 5-40 mL  5-40 mL IntraVENous PRN    acetaminophen (TYLENOL) tablet 650 mg  650 mg Oral Q6H PRN    Or    acetaminophen (TYLENOL) suppository 650 mg  650 mg Rectal Q6H PRN    polyethylene glycol (MIRALAX) packet 17 g  17 g Oral DAILY PRN    ondansetron (ZOFRAN ODT) tablet 4 mg  4 mg Oral Q6H PRN    Or    ondansetron (ZOFRAN) injection 4 mg  4 mg IntraVENous Q6H PRN    atorvastatin (LIPITOR) tablet 80 mg  80 mg Oral QHS    apixaban (ELIQUIS) tablet 2.5 mg  2.5 mg Oral BID    escitalopram oxalate (LEXAPRO) tablet 10 mg  10 mg Oral DAILY    folic acid (FOLVITE) tablet 1 mg  1 mg Oral DAILY    venlafaxine-SR (EFFEXOR-XR) capsule 75 mg  75 mg Oral DAILY    labetaloL (NORMODYNE;TRANDATE) injection 10 mg  10 mg IntraVENous Q2H PRN     ______________________________________________________________________  EXPECTED LENGTH OF STAY: 3d 21h  ACTUAL LENGTH OF STAY:          5                 Bibiana Martinez Asa, MD

## 2023-02-03 NOTE — PROGRESS NOTES
Palliative Medicine Consult  Andrés: [phone] ([phone])    Patient Name: Anthony Walters  YOB: 1932    Date of Initial Consult: 2023  Follow up:  2023  Follow up:  2023  Reason for Consult: care decisions  Requesting Provider: Jody Muller    Primary Care Physician: Sarah Butt MD     SUMMARY:   Anthony Walters is a 80 y.o. presented on 2023 from [facility] due to edema- generalized and facial, impaired cognition. Admitted with a diagnosis of anasarca (possibly nephrotic syndrome), JIM on CKD 4 vs progressive CKD, HFpEF- mild diastolic dysfunction- EF 78-92% in 7077, acute metabolic encephalopathy (? Due to renal status) vs baseline mental status, anemia, temp 100.2    Consults:   Renal, neurology and cardiology     Further workup: Age indeterminate left cerebellar infarct on CT, no new infarct noted on MRI. Repeat echo- EF 55-60% with ? Mild to mod AS, nephrotic syndrome workup           PMH:  HTN, hyperlipidemia, afib (Eliquis), CKD 4, HFpEF, COPD, GERD, depressive disorder (Lexapro and Effexor), carotid endarterectomy, dementia (no formal workup)    Past hospitalizations:  2023  ED visit for left leg pain  10/7/2022  ED visit for right wrist pain  2021-2021- acute lunar infarct  2020- 3/00/4008- chronic diastolic heart failure     Current medical issues leading to Palliative Medicine involvement include: care decisions. Social:  patient has lived at [facility] [facility] assisted living since 2021. Prior to this, patient lived with her daughter. Requires help with ADLs and requires to be fed, wc dependent x 1+ years, mostly non-verbal (patient stutters so chooses not to speak). Enjoys watching TV-[employer] channel. Patient-  from her first . Second  is - 5-6 years ago. Hannah Duke (daughter)   Has another daughter, Madeline Caballero, (lives in [place]) but   reports she is not involved in her mother's care. Oralia Key reports her mother has signed an AMD in the past at another hospital. She does not have a copy at this time. Oralia Key reports she is the primary decision maker      PALLIATIVE DIAGNOSES:   Palliative care encounter  HFpEF- 55-60%   JIM on CKD 4, possible nephrotic syndrome  Requires assistance for all ADLs  Wheelchair bound   Reluctance to speak due to stuttering   Goals of care conversation     PLAN:   Patient admitted with impaired cognition, anasarca. Patient is at her baseline mental status and functional level at this time. Wheelchair bound for 1+ years, and requires assistance with all ADLs, including eating. The is tolerating a regular diet with no swallowing issues. Neurology workup noted small chronic left cerebellar infarct. Patient has stable EF -55-60%, has intermittent afib- on BB and anticoagulation. Diuretics are being adjusted due pulmonary edema on CXR. Remains on supplemental oxygen at this time. Patient undergoing workup for nephrotic syndrome, and management of JIM on CKD 4. No renal biopsy planned and family does not want HD. Spoke to patient's daughter, Norah Bah, in person today. She confirms the goal for discharge back to [place] and for patient to return tot the hospital if indicated. She is clear about DNR for cardiac or respiratory arrest but Oralia Key is unsure about intubation for respiratory decline and she is unsure about a feeding tube at this time. Initial consult note routed to primary continuity provider and/or primary health care team members  Communicated plan of care with: Palliative IDTDiana 192 Team     GOALS OF CARE / TREATMENT PREFERENCES:     GOALS OF CARE:   Patient/Health Care Proxy Stated Goals: Prolong life (no HD or renal bx)    TREATMENT PREFERENCES:   Code Status: DNR .  DDNR signed with Dr. Kya Alvarez     Patient and family's personal goals include:  return to [address] upcoming milestones or family events: none reported       Advance Care Planning:  [] [facility] Interdisciplinary Team has updated the ACP Navigator with Carla and Patient Capacity      Advance Care Planning 2/27/2020   Patient's Healthcare Decision Maker is: Verbal statement (Legal Next of Kin remains as decision maker)   Confirm Advance Directive Yes, not on file       Medical Interventions: Limited additional interventions     Daughter, Edward Haddad, reports she is the primary decision maker   Patient does have a second daughter, Elida Villegas, who lives in [place]. Other:    As far as possible, the palliative care team has discussed with patient / health care proxy about goals of care / treatment preferences for patient.      HISTORY:     History obtained from: chart, team, patient and dtr     CHIEF COMPLAINT: Pt voiced no complaints at this time     HPI/SUBJECTIVE:    The patient is:   [x] Verbal and participatory (patient did speak a few words)  [] Non-participatory due to: medical condition        Clinical Pain Assessment (nonverbal scale for severity on nonverbal patients):   Clinical Pain Assessment  Severity: 0          Duration: for how long has pt been experiencing pain (e.g., 2 days, 1 month, years)  Frequency: how often pain is an issue (e.g., several times per day, once every few days, constant)     FUNCTIONAL ASSESSMENT:     Palliative Performance Scale (PPS):  PPS: 40       PSYCHOSOCIAL/SPIRITUAL SCREENING:     Palliative IDT has assessed this patient for cultural preferences / practices and a referral made as appropriate to needs (Cultural Services, Patient Advocacy, Ethics, etc.)    Any spiritual / Methodist concerns:  [] Yes /  [x] No  [] Unable to obtain this information  If \"Yes\" to discuss with pastoral care during IDT     Does caregiver feel burdened by caring for their loved one:   [] Yes /  [] No /  [] No Caregiver Present/Available [] No Caregiver [x] Pt Lives at [place] [address]  If \"Yes\" to discuss with social work during IDT    Anticipatory grief assessment:   [x] Normal  / [] Maladaptive   [] Unable to obtain this information  [] n/a  If \"Maladaptive\" to discuss with social work during IDT    ESAS Anxiety: Anxiety: 0    ESAS Depression:          REVIEW OF SYSTEMS:     Positive and pertinent negative findings in ROS are noted above in HPI. The following systems were [x] reviewed / [] unable to be reviewed as noted in HPI  Other findings are noted below. Systems: constitutional, ears/nose/mouth/throat, respiratory, gastrointestinal, genitourinary, musculoskeletal, integumentary, neurologic, psychiatric, endocrine. Positive findings noted below. Modified ESAS Completed by: provider     Drowsiness: 0     Pain: 0   Anxiety: 0 Nausea: 0   Anorexia: 0 Dyspnea: 1     Constipation:  (no documented BM, patient does not have fullness or abdominal discomfort)     Stool Occurrence(s): 1        PHYSICAL EXAM:     From RN flowsheet:  Wt Readings from Last 3 Encounters:   02/02/23 125 lb (56.7 kg)   01/18/23 125 lb (56.7 kg)   10/07/22 140 lb 10.5 oz (63.8 kg)     Blood pressure (!) 171/73, pulse 80, temperature 98 °F (36.7 °C), resp. rate 16, height 5' 5\" (1.651 m), weight 125 lb (56.7 kg), SpO2 97 %. Pain Scale 1: Numeric (0 - 10)  Pain Intensity 1: 0  Pain Onset 1: unknown  Pain Location 1: Knee  Pain Orientation 1: Left  Pain Description 1: Aching  Pain Intervention(s) 1: Medication (see MAR)  Last bowel movement, if known: unknown    Constitutional: elderly female, resting in bed, sleeping but easily awakened, alert,  able to answer simple questions. Able to tell me her name, her daughter's name.  Denies pain at this time   Eyes: pupils equal, anicteric, no marked facial edema   ENMT: no nasal discharge, moist mucous membranes  Cardiovascular:  HR 65  Respiratory: breathing not labored, symmetric, 2 LPM, sat 93%    Gastrointestinal: soft non-tender,  :  purewik in place   Musculoskeletal: no deformity, no tenderness to palpation  Skin: warm, dry  Neurologic: following commands, moving all extremities  Psychiatric: no hallucinations  Other:       HISTORY:     Active Problems:    [facility]F exacerbation ([facility] (1/29/2023)      JIM (acute kidney injury) ([facility] (7/68/4174)      Diastolic CHF, chronic (HCC) (1/30/2023)      Decreased functional mobility and endurance (1/30/2023)      Abnormal CT of brain (1/30/2023)    Past Medical History:   Diagnosis Date    A-fib ([facility]     Anemia     Bilateral cataracts     Bronchitis     CKD (chronic kidney disease)     COPD (chronic obstructive pulmonary disease) (HCC)     Edema     GERD (gastroesophageal reflux disease)     HTN (hypertension)     Hypercholesterolemia     Hyperlipidemia     Insomnia     Major depressive disorder     Stuttering     Vitamin deficiency       Past Surgical History:   Procedure Laterality Date    HX CAROTID ENDARTERECTOMY      HX HYSTERECTOMY      HX TONSILLECTOMY        No family history on file. History reviewed, no pertinent family history.   Social History     Tobacco Use    Smoking status: Never    Smokeless tobacco: Never   Substance Use Topics    Alcohol use: Never     Allergies   Allergen Reactions    Keflex [Cephalexin] Hives      Current Facility-Administered Medications   Medication Dose Route Frequency    furosemide (LASIX) injection 40 mg  40 mg IntraVENous BID    ergocalciferol capsule 50,000 Units  50,000 Units Oral Q7D    carvediloL (COREG) tablet 12.5 mg  12.5 mg Oral BID WITH MEALS    amLODIPine (NORVASC) tablet 5 mg  5 mg Oral BID    iron sucrose (VENOFER) injection 200 mg  200 mg IntraVENous Q24H    sodium chloride (NS) flush 5-40 mL  5-40 mL IntraVENous Q8H    sodium chloride (NS) flush 5-40 mL  5-40 mL IntraVENous PRN    acetaminophen (TYLENOL) tablet 650 mg  650 mg Oral Q6H PRN    Or    acetaminophen (TYLENOL) suppository 650 mg  650 mg Rectal Q6H PRN    polyethylene glycol (MIRALAX) packet 17 g  17 g Oral DAILY PRN    ondansetron (ZOFRAN ODT) tablet 4 mg  4 mg Oral Q6H PRN    Or    ondansetron (ZOFRAN) injection 4 mg  4 mg IntraVENous Q6H PRN    atorvastatin (LIPITOR) tablet 80 mg  80 mg Oral QHS    apixaban (ELIQUIS) tablet 2.5 mg  2.5 mg Oral BID    escitalopram oxalate (LEXAPRO) tablet 10 mg  10 mg Oral DAILY    folic acid (FOLVITE) tablet 1 mg  1 mg Oral DAILY    venlafaxine-SR (EFFEXOR-XR) capsule 75 mg  75 mg Oral DAILY    labetaloL (NORMODYNE;TRANDATE) injection 10 mg  10 mg IntraVENous Q2H PRN          LAB AND IMAGING FINDINGS:     Lab Results   Component Value Date/Time    WBC 9.2 02/02/2023 10:15 AM    HGB 8.0 (L) 02/02/2023 10:15 AM    PLATELET 044 99/06/4070 10:15 AM     Lab Results   Component Value Date/Time    Sodium 139 02/02/2023 10:15 AM    Potassium 3.9 02/02/2023 10:15 AM    Chloride 108 02/02/2023 10:15 AM    CO2 25 02/02/2023 10:15 AM    BUN 46 (H) 02/02/2023 10:15 AM    Creatinine 3.88 (H) 02/02/2023 10:15 AM    Calcium 8.0 (L) 02/02/2023 10:15 AM    Magnesium 1.9 02/02/2023 10:15 AM    Phosphorus 3.9 02/02/2023 10:15 AM      Lab Results   Component Value Date/Time    Alk. phosphatase 157 (H) 01/29/2023 01:49 PM    Protein, total 6.1 (L) 01/29/2023 01:49 PM    Albumin 1.8 (L) 02/02/2023 10:15 AM    Globulin 4.1 (H) 01/29/2023 01:49 PM     Lab Results   Component Value Date/Time    INR 1.1 01/29/2023 01:49 PM    Prothrombin time 11.8 (H) 01/29/2023 01:49 PM      Lab Results   Component Value Date/Time    Iron 29 (L) 01/29/2023 01:49 PM    TIBC 171 (L) 01/29/2023 01:49 PM    Iron % saturation 17 (L) 01/29/2023 01:49 PM    Ferritin 175 01/29/2023 01:49 PM      No results found for: PH, PCO2, PO2  No components found for: GLPOC   No results found for: CPK, CKMB             Total time: 36  minutes  Counseling / coordination time, spent as noted above: 25  minutes  > 50% counseling / coordination?: yes    Prolonged service was provided for  []30 min   []75 min in face to face time in the presence of the patient, spent as noted above.   Time Start: Time End:   Note: this can only be billed with 59973 (initial) or 91487 (follow up). If multiple start / stop times, list each separately.

## 2023-02-03 NOTE — NURSE NAVIGATOR
Spoke with staff at [facility]. Patient is seen by the doctor in the facility. They will put her down to be seen on the doctor's next day there Feb 14th. Follow up scheduled with cardiology on 2/9/23 at 10:20 am.  Information on After Visit Summary.

## 2023-02-03 NOTE — PROGRESS NOTES
Transition of Care Plan  RUR- 18% Moderate Risk  DISPOSITION: The disposition plan is home with family assistance. Patient resides at the [facility] and will return once medically stable for discharge. F/U with PCP/Specialist    Transport: [facility] - phone [phone], scheduled for     [facility]  Phone Number: [phone]  Address: [address]  Room Number: Room 116    At 10:40am - CM met with patient/patient daughter and coordinator from [facility] to introduce self. Coordinator requested copy of progress notes. CM printed and provided. CM encouraged to ask facility if patient can return over the weekend. Per request of attending. CM contacted facility and CM informed that patient can return to facility over the weekend.     Norma Wilkerson, MSW, LTSS, LMHP-e  Available in [employer]

## 2023-02-03 NOTE — PROGRESS NOTES
Renal Progress Note    NAME:  Gisella Hernandez   :   1932   MRN:   [id number]     Date/Time:  2/3/2023   Subjective:       23-patient seen and examined, sleeping, arousable, denies SONB. On 5 lit O2 this am. Cr higher, no urine output recorded. Has small amount of urine in purewick container    23-Patient awake and alert, feels ok, denies SOB. On 2 lit O2. No labs today      2/3/2023 --> F/U JIM - [place]       No major complaints. Appetite is fair.     Review of Systems:  Medications reviewed:  Current Facility-Administered Medications   Medication Dose Route Frequency    furosemide (LASIX) injection 40 mg  40 mg IntraVENous BID    ergocalciferol capsule 50,000 Units  50,000 Units Oral Q7D    carvediloL (COREG) tablet 12.5 mg  12.5 mg Oral BID WITH MEALS    amLODIPine (NORVASC) tablet 5 mg  5 mg Oral BID    iron sucrose (VENOFER) injection 200 mg  200 mg IntraVENous Q24H    sodium chloride (NS) flush 5-40 mL  5-40 mL IntraVENous Q8H    sodium chloride (NS) flush 5-40 mL  5-40 mL IntraVENous PRN    acetaminophen (TYLENOL) tablet 650 mg  650 mg Oral Q6H PRN    Or    acetaminophen (TYLENOL) suppository 650 mg  650 mg Rectal Q6H PRN    polyethylene glycol (MIRALAX) packet 17 g  17 g Oral DAILY PRN    ondansetron (ZOFRAN ODT) tablet 4 mg  4 mg Oral Q6H PRN    Or    ondansetron (ZOFRAN) injection 4 mg  4 mg IntraVENous Q6H PRN    atorvastatin (LIPITOR) tablet 80 mg  80 mg Oral QHS    apixaban (ELIQUIS) tablet 2.5 mg  2.5 mg Oral BID    escitalopram oxalate (LEXAPRO) tablet 10 mg  10 mg Oral DAILY    folic acid (FOLVITE) tablet 1 mg  1 mg Oral DAILY    venlafaxine-SR (EFFEXOR-XR) capsule 75 mg  75 mg Oral DAILY    labetaloL (NORMODYNE;TRANDATE) injection 10 mg  10 mg IntraVENous Q2H PRN        Objective:   Vitals:  Visit Vitals  BP (!) 184/70 (BP 1 Location: Right upper arm, BP Patient Position: At rest;Lying)   Pulse 63   Temp 97.2 °F (36.2 °C)   Resp 16   Ht 5' 5\" (1.651 m)   Wt 56.7 kg (125 lb) SpO2 96%   BMI 20.80 kg/m²     Temp (24hrs), Av.6 °F (36.4 °C), Min:97.2 °F (36.2 °C), Max:98 °F (36.7 °C)    O2 Flow Rate (L/min): 2 l/min O2 Device: Nasal cannula    Last 24hr Input/Output:    Intake/Output Summary (Last 24 hours) at 2/3/2023 1303  Last data filed at 2/3/2023 0000  Gross per 24 hour   Intake --   Output 650 ml   Net -650 ml          Physical Exam:    Seen in Room 212. Her Daughter was assisting with her meal.    General: awake and alert, No distress,     HEENT: Sclerae without icterus. .     Lungs : No wheezes, No rales    CVS: No S3 gallop , No pericardial rub    Abdomen: Not distended. Neurologic:  Responding appropriately    Psych: normal affect       [] Telemetry Reviewed     [] NSR [] PAC/PVCs   [] Afib  [] Paced   [] NSVT   [] Funes [] NGT  [] Intubated on vent    Lab Data Reviewed:    Recent Results (from the past 24 hour(s))   CBC WITH AUTOMATED DIFF    Collection Time: 23  5:08 AM   Result Value Ref Range    WBC 10.2 3.6 - 11.0 K/uL    RBC 2.64 (L) 3.80 - 5.20 M/uL    HGB 7.9 (L) 11.5 - 16.0 g/dL    HCT 24.4 (L) 35.0 - 47.0 %    MCV 92.4 80.0 - 99.0 FL    MCH 29.9 26.0 - 34.0 PG    MCHC 32.4 30.0 - 36.5 g/dL    RDW 12.3 11.5 - 14.5 %    PLATELET 561 375 - 198 K/uL    MPV 10.2 8.9 - 12.9 FL    NRBC 0.0 0  WBC    ABSOLUTE NRBC 0.00 0.00 - 0.01 K/uL    NEUTROPHILS 71 32 - 75 %    LYMPHOCYTES 15 12 - 49 %    MONOCYTES 10 5 - 13 %    EOSINOPHILS 3 0 - 7 %    BASOPHILS 1 0 - 1 %    IMMATURE GRANULOCYTES 0 0.0 - 0.5 %    ABS. NEUTROPHILS 7.3 1.8 - 8.0 K/UL    ABS. LYMPHOCYTES 1.5 0.8 - 3.5 K/UL    ABS. MONOCYTES 1.0 0.0 - 1.0 K/UL    ABS. EOSINOPHILS 0.3 0.0 - 0.4 K/UL    ABS. BASOPHILS 0.1 0.0 - 0.1 K/UL    ABS. IMM.  GRANS. 0.0 0.00 - 0.04 K/UL    DF AUTOMATED     RENAL FUNCTION PANEL    Collection Time: 23  5:08 AM   Result Value Ref Range    Sodium 139 136 - 145 mmol/L    Potassium 3.9 3.5 - 5.1 mmol/L    Chloride 108 97 - 108 mmol/L    CO2 25 21 - 32 mmol/L Anion gap 6 5 - 15 mmol/L    Glucose 97 65 - 100 mg/dL    BUN 42 (H) 6 - 20 MG/DL    Creatinine 3.82 (H) 0.55 - 1.02 MG/DL    BUN/Creatinine ratio 11 (L) 12 - 20      eGFR 11 (L) >60 ml/min/1.73m2    Calcium 7.8 (L) 8.5 - 10.1 MG/DL    Phosphorus 4.0 2.6 - 4.7 MG/DL    Albumin 1.8 (L) 3.5 - 5.0 g/dL           Assessment/Plan:   Active Problems:    CHF exacerbation (HCC) (1/29/2023)      JIM (acute kidney injury) ([facility] [address] (3/27/0251)      Diastolic CHF, chronic (HCC) (1/30/2023)      Decreased functional mobility and endurance (1/30/2023)      Abnormal CT of brain (1/30/2023)       JIM on CKD4 versus progression of CKD4-most likely progression of her CKD  -DRE no hydro, NO MRD? Nl size kidneys  -no prior nephrology w/up. Has nephrotic syndrome, had proteinuria in 2021 also. UA bland  -no need for RRT, family does not want dialysis  -avoid NSAIDs and Iv contrast  -check bladder scan  -Is and Os  -CXR with increase pulm edema and lt pleural effuson  - Furosemide 40 mg IV bid  -     Nephrotic syndrome,   --Ddx includes MGN, amyloidosis, Multiple myeloma, no h/o DM, TIN, etc  -f/up  serology work up, A1C  -Needs renal biopsy but Given her age and comorbidities recommend conservative management. Daughter also is not interested in the aggressive  management including renal biopsy and dialysis  -monitor renal function. -MED, ANCA neg, complement nl, A1C 4.8.   SPEP and the rest of serology pending  -HBsAg neg, HBcore Ab Ig M positive , checkHBsAb    Hypokalemia, repleted    Secondary hyperpara  -, vit D 20, start vit D     Uncontrolled HTN  -increased amlodipine to 5 mg bid     Acute on chronic HFpEF  --CXR with increase pulm edema and lt pleural effuson  - Furosemide 40 mg IV bid     Anemia of CKD  -low iron 17%, IV iron     HLD  ___________________________________________________    Total time spent with patient:  [] 15   [] 25   [] 35   []  __ minutes    [] Critical Care Provided    Care Plan discussed with:    Daughter      [x] Patient   [x] Family    [] Care Manager   [] Consultant/Specialist :      []   >50% of visit spent in counseling and coordination of care   (Discussed [] CODE status,  [] Care Plan, [] D/C Planning)    ___________________________________________________    Attending Physician: Michael Mares, [address] [facility]

## 2023-02-04 LAB
ALBUMIN SERPL-MCNC: 1.9 G/DL (ref 3.5–5)
ANION GAP SERPL CALC-SCNC: 7 MMOL/L (ref 5–15)
BASOPHILS # BLD: 0.1 K/UL (ref 0–0.1)
BASOPHILS NFR BLD: 1 % (ref 0–1)
BUN SERPL-MCNC: 44 MG/DL (ref 6–20)
BUN/CREAT SERPL: 11 (ref 12–20)
CALCIUM SERPL-MCNC: 8.4 MG/DL (ref 8.5–10.1)
CHLORIDE SERPL-SCNC: 107 MMOL/L (ref 97–108)
CO2 SERPL-SCNC: 24 MMOL/L (ref 21–32)
CREAT SERPL-MCNC: 3.95 MG/DL (ref 0.55–1.02)
DIFFERENTIAL METHOD BLD: ABNORMAL
EOSINOPHIL # BLD: 0.3 K/UL (ref 0–0.4)
EOSINOPHIL NFR BLD: 3 % (ref 0–7)
ERYTHROCYTE [DISTWIDTH] IN BLOOD BY AUTOMATED COUNT: 12.2 % (ref 11.5–14.5)
GLUCOSE SERPL-MCNC: 99 MG/DL (ref 65–100)
HBV DNA SERPL NAA+PROBE-ACNC: NORMAL IU/ML
HBV DNA SERPL NAA+PROBE-LOG IU: NORMAL LOG10 IU/ML
HCT VFR BLD AUTO: 26.6 % (ref 35–47)
HGB BLD-MCNC: 8.5 G/DL (ref 11.5–16)
IMM GRANULOCYTES # BLD AUTO: 0 K/UL (ref 0–0.04)
IMM GRANULOCYTES NFR BLD AUTO: 0 % (ref 0–0.5)
LYMPHOCYTES # BLD: 1.5 K/UL (ref 0.8–3.5)
LYMPHOCYTES NFR BLD: 16 % (ref 12–49)
MCH RBC QN AUTO: 29.9 PG (ref 26–34)
MCHC RBC AUTO-ENTMCNC: 32 G/DL (ref 30–36.5)
MCV RBC AUTO: 93.7 FL (ref 80–99)
MONOCYTES # BLD: 0.8 K/UL (ref 0–1)
MONOCYTES NFR BLD: 8 % (ref 5–13)
NEUTS SEG # BLD: 6.5 K/UL (ref 1.8–8)
NEUTS SEG NFR BLD: 72 % (ref 32–75)
NRBC # BLD: 0 K/UL (ref 0–0.01)
NRBC BLD-RTO: 0 PER 100 WBC
PHOSPHATE SERPL-MCNC: 4.5 MG/DL (ref 2.6–4.7)
PLATELET # BLD AUTO: 357 K/UL (ref 150–400)
PMV BLD AUTO: 10.2 FL (ref 8.9–12.9)
POTASSIUM SERPL-SCNC: 4.1 MMOL/L (ref 3.5–5.1)
RBC # BLD AUTO: 2.84 M/UL (ref 3.8–5.2)
SODIUM SERPL-SCNC: 138 MMOL/L (ref 136–145)
TEST INFORMATION: NORMAL
WBC # BLD AUTO: 9.2 K/UL (ref 3.6–11)

## 2023-02-04 PROCEDURE — 85025 COMPLETE CBC W/AUTO DIFF WBC: CPT

## 2023-02-04 PROCEDURE — 74011250637 HC RX REV CODE- 250/637: Performed by: STUDENT IN AN ORGANIZED HEALTH CARE EDUCATION/TRAINING PROGRAM

## 2023-02-04 PROCEDURE — 74011250637 HC RX REV CODE- 250/637: Performed by: INTERNAL MEDICINE

## 2023-02-04 PROCEDURE — 74011000250 HC RX REV CODE- 250: Performed by: INTERNAL MEDICINE

## 2023-02-04 PROCEDURE — 36415 COLL VENOUS BLD VENIPUNCTURE: CPT

## 2023-02-04 PROCEDURE — 80069 RENAL FUNCTION PANEL: CPT

## 2023-02-04 PROCEDURE — 74011250636 HC RX REV CODE- 250/636: Performed by: INTERNAL MEDICINE

## 2023-02-04 PROCEDURE — 65270000046 HC RM TELEMETRY

## 2023-02-04 RX ORDER — FUROSEMIDE 40 MG/1
40 TABLET ORAL 2 TIMES DAILY
Status: DISCONTINUED | OUTPATIENT
Start: 2023-02-04 | End: 2023-02-06 | Stop reason: HOSPADM

## 2023-02-04 RX ADMIN — CARVEDILOL 12.5 MG: 12.5 TABLET, FILM COATED ORAL at 07:42

## 2023-02-04 RX ADMIN — FUROSEMIDE 40 MG: 40 TABLET ORAL at 18:49

## 2023-02-04 RX ADMIN — AMLODIPINE BESYLATE 5 MG: 5 TABLET ORAL at 18:49

## 2023-02-04 RX ADMIN — AMLODIPINE BESYLATE 5 MG: 5 TABLET ORAL at 11:02

## 2023-02-04 RX ADMIN — ATORVASTATIN CALCIUM 80 MG: 20 TABLET, FILM COATED ORAL at 23:50

## 2023-02-04 RX ADMIN — FUROSEMIDE 40 MG: 10 INJECTION, SOLUTION INTRAMUSCULAR; INTRAVENOUS at 11:03

## 2023-02-04 RX ADMIN — VENLAFAXINE HYDROCHLORIDE 75 MG: 37.5 CAPSULE, EXTENDED RELEASE ORAL at 11:02

## 2023-02-04 RX ADMIN — SODIUM CHLORIDE, PRESERVATIVE FREE 10 ML: 5 INJECTION INTRAVENOUS at 23:50

## 2023-02-04 RX ADMIN — IRON SUCROSE 200 MG: 20 INJECTION, SOLUTION INTRAVENOUS at 18:52

## 2023-02-04 RX ADMIN — FOLIC ACID 1 MG: 1 TABLET ORAL at 11:02

## 2023-02-04 RX ADMIN — SODIUM CHLORIDE, PRESERVATIVE FREE 10 ML: 5 INJECTION INTRAVENOUS at 18:59

## 2023-02-04 RX ADMIN — CARVEDILOL 12.5 MG: 12.5 TABLET, FILM COATED ORAL at 18:52

## 2023-02-04 RX ADMIN — SODIUM CHLORIDE, PRESERVATIVE FREE 10 ML: 5 INJECTION INTRAVENOUS at 07:50

## 2023-02-04 RX ADMIN — ESCITALOPRAM 10 MG: 10 TABLET, FILM COATED ORAL at 11:02

## 2023-02-04 RX ADMIN — APIXABAN 2.5 MG: 2.5 TABLET, FILM COATED ORAL at 11:02

## 2023-02-04 RX ADMIN — APIXABAN 2.5 MG: 2.5 TABLET, FILM COATED ORAL at 18:50

## 2023-02-04 NOTE — PROGRESS NOTES
[address] [facility] Adult  Hospitalist Group                                                                                          Hospitalist Progress Note  Lily Navarro MD  Answering service: [phone] or [phone] from in house phone        Date of Service:  2023  NAME:  Belén Casanova  :  1932  MRN:  [id number]       Admission Summary:   HPI: Judith Thakkar is a 80 y.o. female from [address] [facility] [address] [facility] with h/o HTN, afib on Eliquis, CKD, CHF on torsemide, and mostly nonverbal per daughter who p/w increasing generalized edema over the last several days, also lethargy and AMS. Per daughter, she speaks minimally \"she just doesn't want to speak\" (possibly because of a stutter), she sleeps [place] lot,\" doesn't eat/drink much and doesn't like being told what to do. Per daughter, she developed edema about a week ago, but she looked better on Thursday. She had developed facial edema initially which was felt to be d/t her sleeping most of the day with her head down in her wheelchair. No known falls. She has been wheelchair-bound for over a year. The daughter says the facility takes good care of her. Pt in NAD. Denies pain. \"          Interval history / Subjective:   Patient seen examined earlier bedside, resting comfortably. Denies any acute concerns or complaints this morning. Weaned to RA today. Assessment & Plan:     Anasarca, improving   JIM on CKD stage IV > nephrotic syndrome?   HFpEF exacerbation class II  HypoK  Hypertension  Acute hypoxic respiratory failure secondary to pleural edema, resolved   - Requires gentle diuresis due to JIM on CKD  -TTE appears stable ef  -DRE neg for hydro  -Per notes, family does not want dialysis, but feels patient is not ready for biopsy at this time   -ANCAs, complements ordered, patient will need renal biopsy to confirm diagnosis but age and other comorbidities would like to continue with conservative management  -replete lytes and follow  -Discussed with nephro, patient likely at her new baseline creatinine  -Patient now on room air, will transition to oral lasix     Acute metabolic encephalopathy on chronic dementia  - CT head showed age-indeterminate infarct, MRI brain consistent with chronic findings  - UA neg, tsh b12 folate wnl   - Passed swallow eval     History of A. fib currently NSR  Chronic cerebellar infarct  -cont Eliquis, statin Continue metoprolol    Anemia of Chronic Disease  -FOBT is negative, anemia work-up consistent with ACD likely secondary to renal insufficiency  -Continue to monitor CBC, transfuse for hemoglobin less than 7    Depression -New SSRI/SNRI  Hyperlipidemia - statin   Generalized deconditioning, pt bedbound at baseline - PT OT    Critically ill high risk for decompensation.   CCT time 30 minutes    Palliative has also been consulted for goals of care, appreciate involvement        Code status: dnr  Prophylaxis: Eliquis  Care Plan discussed with: patient/family, nurse, case management  Anticipated Disposition:   Inpatient 24 hrs , likely return to [facility] when ready  Cardiac monitoring: Telemetry     Hospital Problems  Date Reviewed: 10/1/2021            Codes Class Noted POA    Diastolic CHF, chronic ([facility] ICD-10-CM: I50.32  ICD-9-CM: 428.32, 428.0  1/30/2023 Unknown        Decreased functional mobility and endurance ICD-10-CM: Z74.09  ICD-9-CM: 780.99  1/30/2023 Unknown        Abnormal CT of brain ICD-10-CM: R90.89  ICD-9-CM: 793.0  1/30/2023 Unknown        CHF exacerbation ([facility] ICD-10-CM: I50.9  ICD-9-CM: 428.0  1/29/2023 Unknown        JIM (acute kidney injury) ([facility] ICD-10-CM: N17.9  ICD-9-CM: 584.9  1/29/2023 Unknown         Social Determinants of Health     Tobacco Use: Low Risk     Smoking Tobacco Use: Never    Smokeless Tobacco Use: Never    Passive Exposure: Not on file   Alcohol Use: Not on file   Financial Resource Strain: Not on file   Food Insecurity: Not on file   Transportation Needs: Not on file Physical Activity: Not on file   Stress: Not on file   Social Connections: Not on file   Intimate Partner Violence: Not on file   Depression: Not on file   Housing Stability: Not on file       Review of Systems:   A comprehensive review of systems was negative except for that written in the HPI. Vital Signs:    Last 24hrs VS reviewed since prior progress note. Most recent are:  Visit Vitals  /61 (BP 1 Location: Right leg, BP Patient Position: At rest)   Pulse 76   Temp 97.8 °F (36.6 °C)   Resp 17   Ht 5' 5\" (1.651 m)   Wt 58.7 kg (129 lb 6.6 oz)   SpO2 95%   BMI 21.53 kg/m²         Intake/Output Summary (Last 24 hours) at 2/4/2023 1537  Last data filed at 2/3/2023 1908  Gross per 24 hour   Intake 120 ml   Output 500 ml   Net -380 ml        Physical Examination:     I had a face to face encounter with this patient and independently examined them on 2/4/2023 as outlined below:          Constitutional:  No acute distress, cooperative, pleasant, demented   ENT:  Oral mucosa moist, oropharynx benign. Resp:  Diminished breath sounds bilaterally. No wheezing/rhonchi/rales. No accessory muscle use. CV:  Regular rhythm, normal rate, no murmurs, gallops, rubs    GI:  Soft, non distended, non tender. normoactive bowel sounds, no hepatosplenomegaly     Musculoskeletal:  Trace edema, warm, 2+ pulses throughout    Neurologic:  Moves all extremities.   AAOx0, CN II-XII reviewed            Data Review:    Review and/or order of clinical lab test  Review and/or order of tests in the radiology section of CPT  Review and/or order of tests in the medicine section of CPT    I have independently reviewed and interpreted patient's lab and all other diagnostic data    Labs:     Recent Labs     02/04/23  0400 02/03/23  0508   WBC 9.2 10.2   HGB 8.5* 7.9*   HCT 26.6* 24.4*    318     Recent Labs     02/04/23  0400 02/03/23  0508 02/02/23  1015    139 139   K 4.1 3.9 3.9    108 108   CO2 24 25 25   BUN 44* 42* 46*   CREA 3.95* 3.82* 3.88*   GLU 99 97 115*   CA 8.4* 7.8* 8.0*   MG  --   --  1.9   PHOS 4.5 4.0 3.9     Recent Labs     02/04/23  0400 02/03/23  0508 02/02/23  1015   ALB 1.9* 1.8* 1.8*     No results for input(s): INR, PTP, APTT, INREXT, INREXT in the last 72 hours. No results for input(s): FE, TIBC, PSAT, FERR in the last 72 hours. Lab Results   Component Value Date/Time    Folate 88.9 (H) 01/29/2023 01:49 PM        No results for input(s): PH, PCO2, PO2 in the last 72 hours. No results for input(s): CPK, CKNDX, TROIQ in the last 72 hours. No lab exists for component: CPKMB  Lab Results   Component Value Date/Time    Cholesterol, total 298 (H) 04/25/2021 03:01 AM    HDL Cholesterol 38 04/25/2021 03:01 AM    LDL, calculated 211.6 (H) 04/25/2021 03:01 AM    Triglyceride 242 (H) 04/25/2021 03:01 AM    CHOL/HDL Ratio 7.8 (H) 04/25/2021 03:01 AM     Lab Results   Component Value Date/Time    Glucose (POC) 87 01/30/2023 04:19 PM     Lab Results   Component Value Date/Time    Color YELLOW/STRAW 01/29/2023 06:51 PM    Appearance CLEAR 01/29/2023 06:51 PM    Specific gravity 1.011 01/29/2023 06:51 PM    Specific gravity 1.020 04/23/2021 03:44 PM    pH (UA) 7.0 01/29/2023 06:51 PM    Protein 300 (A) 01/29/2023 06:51 PM    Glucose 100 (A) 01/29/2023 06:51 PM    Ketone Negative 01/29/2023 06:51 PM    Bilirubin Negative 01/29/2023 06:51 PM    Urobilinogen 0.2 01/29/2023 06:51 PM    Nitrites Negative 01/29/2023 06:51 PM    Leukocyte Esterase Negative 01/29/2023 06:51 PM    Epithelial cells FEW 01/29/2023 06:51 PM    Bacteria 2+ (A) 01/29/2023 06:51 PM    WBC 0-4 01/29/2023 06:51 PM    RBC 0-5 01/29/2023 06:51 PM       Notes reviewed from all clinical/nonclinical/nursing services involved in patient's clinical care. Care coordination discussions were held with appropriate clinical/nonclinical/ nursing providers based on care coordination needs.          Patients current active Medications were reviewed, considered, added and adjusted based on the clinical condition today. Home Medications were reconciled to the best of my ability given all available resources at the time of admission. Route is PO if not otherwise noted.       Medications Reviewed:     Current Facility-Administered Medications   Medication Dose Route Frequency    furosemide (LASIX) tablet 40 mg  40 mg Oral BID    ergocalciferol capsule 50,000 Units  50,000 Units Oral Q7D    carvediloL (COREG) tablet 12.5 mg  12.5 mg Oral BID WITH MEALS    amLODIPine (NORVASC) tablet 5 mg  5 mg Oral BID    iron sucrose (VENOFER) injection 200 mg  200 mg IntraVENous Q24H    sodium chloride (NS) flush 5-40 mL  5-40 mL IntraVENous Q8H    sodium chloride (NS) flush 5-40 mL  5-40 mL IntraVENous PRN    acetaminophen (TYLENOL) tablet 650 mg  650 mg Oral Q6H PRN    Or    acetaminophen (TYLENOL) suppository 650 mg  650 mg Rectal Q6H PRN    polyethylene glycol (MIRALAX) packet 17 g  17 g Oral DAILY PRN    ondansetron (ZOFRAN ODT) tablet 4 mg  4 mg Oral Q6H PRN    Or    ondansetron (ZOFRAN) injection 4 mg  4 mg IntraVENous Q6H PRN    atorvastatin (LIPITOR) tablet 80 mg  80 mg Oral QHS    apixaban (ELIQUIS) tablet 2.5 mg  2.5 mg Oral BID    escitalopram oxalate (LEXAPRO) tablet 10 mg  10 mg Oral DAILY    folic acid (FOLVITE) tablet 1 mg  1 mg Oral DAILY    venlafaxine-SR (EFFEXOR-XR) capsule 75 mg  75 mg Oral DAILY    labetaloL (NORMODYNE;TRANDATE) injection 10 mg  10 mg IntraVENous Q2H PRN     ______________________________________________________________________  EXPECTED LENGTH OF STAY: 3d 21h  ACTUAL LENGTH OF STAY:          6                 Bibiana Steele MD

## 2023-02-04 NOTE — PROGRESS NOTES
Renal Progress Note    NAME:  Paula Barros   :   1932   MRN:   [id number]     Date/Time:  2023   Subjective:       23-patient seen and examined, sleeping, arousable, denies SONB. On 5 lit O2 this am. Cr higher, no urine output recorded. Has small amount of urine in purewick container    23-Patient awake and alert, feels ok, denies SOB. On 2 lit O2. No labs today      2/3/2023 --> F/U JIM - [place]       No major complaints. Appetite is fair. 23 --> F/U JIM - [place]    No new complaints.     Review of Systems:  Medications reviewed:  Current Facility-Administered Medications   Medication Dose Route Frequency    furosemide (LASIX) injection 40 mg  40 mg IntraVENous BID    ergocalciferol capsule 50,000 Units  50,000 Units Oral Q7D    carvediloL (COREG) tablet 12.5 mg  12.5 mg Oral BID WITH MEALS    amLODIPine (NORVASC) tablet 5 mg  5 mg Oral BID    iron sucrose (VENOFER) injection 200 mg  200 mg IntraVENous Q24H    sodium chloride (NS) flush 5-40 mL  5-40 mL IntraVENous Q8H    sodium chloride (NS) flush 5-40 mL  5-40 mL IntraVENous PRN    acetaminophen (TYLENOL) tablet 650 mg  650 mg Oral Q6H PRN    Or    acetaminophen (TYLENOL) suppository 650 mg  650 mg Rectal Q6H PRN    polyethylene glycol (MIRALAX) packet 17 g  17 g Oral DAILY PRN    ondansetron (ZOFRAN ODT) tablet 4 mg  4 mg Oral Q6H PRN    Or    ondansetron (ZOFRAN) injection 4 mg  4 mg IntraVENous Q6H PRN    atorvastatin (LIPITOR) tablet 80 mg  80 mg Oral QHS    apixaban (ELIQUIS) tablet 2.5 mg  2.5 mg Oral BID    escitalopram oxalate (LEXAPRO) tablet 10 mg  10 mg Oral DAILY    folic acid (FOLVITE) tablet 1 mg  1 mg Oral DAILY    venlafaxine-SR (EFFEXOR-XR) capsule 75 mg  75 mg Oral DAILY    labetaloL (NORMODYNE;TRANDATE) injection 10 mg  10 mg IntraVENous Q2H PRN        Objective:   Vitals:  Visit Vitals  /61 (BP 1 Location: Right leg, BP Patient Position: At rest)   Pulse 71   Temp 97.8 °F (36.6 °C)   Resp 17   Ht 5' 5\" (1.651 m)   Wt 58.7 kg (129 lb 6.6 oz)   SpO2 95%   BMI 21.53 kg/m²     Temp (24hrs), Av.8 °F (36.6 °C), Min:96.9 °F (36.1 °C), Max:98.6 °F (37 °C)    O2 Flow Rate (L/min): 2 l/min O2 Device: Nasal cannula    Last 24hr Input/Output:    Intake/Output Summary (Last 24 hours) at 2023 1205  Last data filed at 2/3/2023 1908  Gross per 24 hour   Intake 120 ml   Output 500 ml   Net -380 ml          Physical Exam:    Seen in Room 212. A member of her care team was present. General: awake and alert, No distress,     HEENT: Sclerae without icterus. .     Lungs : No wheezes, No rales    CVS: No S3 gallop     Abdomen: Not distended. Neurologic:  Responding appropriately    Legs : No oedema    Psych: normal affect       [] Telemetry Reviewed     [] NSR [] PAC/PVCs   [] Afib  [] Paced   [] NSVT   [] Funes [] NGT  [] Intubated on vent    Lab Data Reviewed:    Recent Results (from the past 24 hour(s))   CBC WITH AUTOMATED DIFF    Collection Time: 23  4:00 AM   Result Value Ref Range    WBC 9.2 3.6 - 11.0 K/uL    RBC 2.84 (L) 3.80 - 5.20 M/uL    HGB 8.5 (L) 11.5 - 16.0 g/dL    HCT 26.6 (L) 35.0 - 47.0 %    MCV 93.7 80.0 - 99.0 FL    MCH 29.9 26.0 - 34.0 PG    MCHC 32.0 30.0 - 36.5 g/dL    RDW 12.2 11.5 - 14.5 %    PLATELET 951 471 - 931 K/uL    MPV 10.2 8.9 - 12.9 FL    NRBC 0.0 0  WBC    ABSOLUTE NRBC 0.00 0.00 - 0.01 K/uL    NEUTROPHILS 72 32 - 75 %    LYMPHOCYTES 16 12 - 49 %    MONOCYTES 8 5 - 13 %    EOSINOPHILS 3 0 - 7 %    BASOPHILS 1 0 - 1 %    IMMATURE GRANULOCYTES 0 0.0 - 0.5 %    ABS. NEUTROPHILS 6.5 1.8 - 8.0 K/UL    ABS. LYMPHOCYTES 1.5 0.8 - 3.5 K/UL    ABS. MONOCYTES 0.8 0.0 - 1.0 K/UL    ABS. EOSINOPHILS 0.3 0.0 - 0.4 K/UL    ABS. BASOPHILS 0.1 0.0 - 0.1 K/UL    ABS. IMM.  GRANS. 0.0 0.00 - 0.04 K/UL    DF AUTOMATED     RENAL FUNCTION PANEL    Collection Time: 23  4:00 AM   Result Value Ref Range    Sodium 138 136 - 145 mmol/L    Potassium 4.1 3.5 - 5.1 mmol/L    Chloride 107 97 - 108 mmol/L    CO2 24 21 - 32 mmol/L    Anion gap 7 5 - 15 mmol/L    Glucose 99 65 - 100 mg/dL    BUN 44 (H) 6 - 20 MG/DL    Creatinine 3.95 (H) 0.55 - 1.02 MG/DL    BUN/Creatinine ratio 11 (L) 12 - 20      eGFR 10 (L) >60 ml/min/1.73m2    Calcium 8.4 (L) 8.5 - 10.1 MG/DL    Phosphorus 4.5 2.6 - 4.7 MG/DL    Albumin 1.9 (L) 3.5 - 5.0 g/dL           Assessment/Plan:   Active Problems:    CHF exacerbation (HCC) (1/29/2023)      JIM (acute kidney injury) ([facility] [address] (3/29/4421)      Diastolic CHF, chronic (HCC) (1/30/2023)      Decreased functional mobility and endurance (1/30/2023)      Abnormal CT of brain (1/30/2023)       JIM on CKD4 versus progression of CKD4-most likely progression of her CKD  -DRE no hydro, NO MRD? Nl size kidneys  -no prior nephrology w/up. Has nephrotic syndrome, had proteinuria in 2021 also. UA bland  -no need for RRT, family does not want dialysis  -avoid NSAIDs and Iv contrast  -check bladder scan  -Is and Os  -CXR with increase pulm edema and lt pleural effuson  - Furosemide 40 mg IV bid  -     Nephrotic syndrome,   --Ddx includes MGN, amyloidosis, Multiple myeloma, no h/o DM, TIN, etc  -f/up  serology work up, A1C  -Needs renal biopsy but Given her age and comorbidities recommend conservative management. Daughter also is not interested in the aggressive  management including renal biopsy and dialysis  -monitor renal function. -MED, ANCA neg, complement nl, A1C 4.8.   SPEP and the rest of serology pending  -HBsAg neg, HBcore Ab Ig M positive , checkHBsAb    Hypokalemia, repleted    Secondary hyperpara  -, vit D 20, start vit D     Uncontrolled HTN  -increased amlodipine to 5 mg bid     Acute on chronic HFpEF  --CXR with increase pulm edema and lt pleural effuson  - Furosemide 40 mg IV bid     Anemia of CKD  -low iron 17%, IV iron     HLD  ___________________________________________________    Total time spent with patient:  [] 15   [] 25   [] 35   []  __ minutes    [] Critical Care Provided    Care Plan discussed with:          [x] Patient   [] Family    [] Care Manager   [] Consultant/Specialist :      []   >50% of visit spent in counseling and coordination of care   (Discussed [] CODE status,  [] Care Plan, [] D/C Planning)    ___________________________________________________    Attending Physician: Paige Cochran, [address] [facility]

## 2023-02-05 LAB
ALBUMIN SERPL-MCNC: 1.7 G/DL (ref 3.5–5)
ANION GAP SERPL CALC-SCNC: 6 MMOL/L (ref 5–15)
BASOPHILS # BLD: 0.1 K/UL (ref 0–0.1)
BASOPHILS NFR BLD: 1 % (ref 0–1)
BUN SERPL-MCNC: 47 MG/DL (ref 6–20)
BUN/CREAT SERPL: 12 (ref 12–20)
CALCIUM SERPL-MCNC: 8.1 MG/DL (ref 8.5–10.1)
CHLORIDE SERPL-SCNC: 108 MMOL/L (ref 97–108)
CO2 SERPL-SCNC: 24 MMOL/L (ref 21–32)
CREAT SERPL-MCNC: 4.02 MG/DL (ref 0.55–1.02)
DIFFERENTIAL METHOD BLD: ABNORMAL
EOSINOPHIL # BLD: 0.4 K/UL (ref 0–0.4)
EOSINOPHIL NFR BLD: 5 % (ref 0–7)
ERYTHROCYTE [DISTWIDTH] IN BLOOD BY AUTOMATED COUNT: 12.5 % (ref 11.5–14.5)
GLUCOSE SERPL-MCNC: 89 MG/DL (ref 65–100)
HCT VFR BLD AUTO: 25.1 % (ref 35–47)
HGB BLD-MCNC: 7.7 G/DL (ref 11.5–16)
IMM GRANULOCYTES # BLD AUTO: 0 K/UL (ref 0–0.04)
IMM GRANULOCYTES NFR BLD AUTO: 1 % (ref 0–0.5)
LYMPHOCYTES # BLD: 1.9 K/UL (ref 0.8–3.5)
LYMPHOCYTES NFR BLD: 23 % (ref 12–49)
MCH RBC QN AUTO: 28.8 PG (ref 26–34)
MCHC RBC AUTO-ENTMCNC: 30.7 G/DL (ref 30–36.5)
MCV RBC AUTO: 94 FL (ref 80–99)
MONOCYTES # BLD: 0.8 K/UL (ref 0–1)
MONOCYTES NFR BLD: 10 % (ref 5–13)
NEUTS SEG # BLD: 5 K/UL (ref 1.8–8)
NEUTS SEG NFR BLD: 60 % (ref 32–75)
NRBC # BLD: 0 K/UL (ref 0–0.01)
NRBC BLD-RTO: 0 PER 100 WBC
PHOSPHATE SERPL-MCNC: 4.7 MG/DL (ref 2.6–4.7)
PLATELET # BLD AUTO: 368 K/UL (ref 150–400)
PMV BLD AUTO: 10 FL (ref 8.9–12.9)
POTASSIUM SERPL-SCNC: 4.1 MMOL/L (ref 3.5–5.1)
RBC # BLD AUTO: 2.67 M/UL (ref 3.8–5.2)
SODIUM SERPL-SCNC: 138 MMOL/L (ref 136–145)
WBC # BLD AUTO: 8.2 K/UL (ref 3.6–11)

## 2023-02-05 PROCEDURE — 65270000046 HC RM TELEMETRY

## 2023-02-05 PROCEDURE — 36415 COLL VENOUS BLD VENIPUNCTURE: CPT

## 2023-02-05 PROCEDURE — 85025 COMPLETE CBC W/AUTO DIFF WBC: CPT

## 2023-02-05 PROCEDURE — 74011250637 HC RX REV CODE- 250/637: Performed by: STUDENT IN AN ORGANIZED HEALTH CARE EDUCATION/TRAINING PROGRAM

## 2023-02-05 PROCEDURE — 74011000250 HC RX REV CODE- 250: Performed by: INTERNAL MEDICINE

## 2023-02-05 PROCEDURE — 74011250637 HC RX REV CODE- 250/637: Performed by: INTERNAL MEDICINE

## 2023-02-05 PROCEDURE — 80069 RENAL FUNCTION PANEL: CPT

## 2023-02-05 RX ADMIN — FOLIC ACID 1 MG: 1 TABLET ORAL at 09:27

## 2023-02-05 RX ADMIN — ESCITALOPRAM 10 MG: 10 TABLET, FILM COATED ORAL at 09:27

## 2023-02-05 RX ADMIN — APIXABAN 2.5 MG: 2.5 TABLET, FILM COATED ORAL at 19:22

## 2023-02-05 RX ADMIN — SODIUM CHLORIDE, PRESERVATIVE FREE 10 ML: 5 INJECTION INTRAVENOUS at 21:53

## 2023-02-05 RX ADMIN — VENLAFAXINE HYDROCHLORIDE 75 MG: 37.5 CAPSULE, EXTENDED RELEASE ORAL at 09:27

## 2023-02-05 RX ADMIN — CARVEDILOL 12.5 MG: 12.5 TABLET, FILM COATED ORAL at 19:22

## 2023-02-05 RX ADMIN — APIXABAN 2.5 MG: 2.5 TABLET, FILM COATED ORAL at 09:27

## 2023-02-05 RX ADMIN — AMLODIPINE BESYLATE 5 MG: 5 TABLET ORAL at 19:22

## 2023-02-05 RX ADMIN — ATORVASTATIN CALCIUM 80 MG: 20 TABLET, FILM COATED ORAL at 21:53

## 2023-02-05 NOTE — PROGRESS NOTES
Renal Progress Note    NAME:  Bhavya Schaeffer   :   1932   MRN:   [id number]     Date/Time:  2023   Subjective:       23-patient seen and examined, sleeping, arousable, denies SONB. On 5 lit O2 this am. Cr higher, no urine output recorded. Has small amount of urine in purewick container    23-Patient awake and alert, feels ok, denies SOB. On 2 lit O2. No labs today      2/3/2023 --> F/U JIM - [place]       No major complaints. Appetite is fair. 23 --> F/U JIM - [place]    No new complaints. 23 -> F/U JIM - [place]    No new complaints.     Review of Systems:  Medications reviewed:  Current Facility-Administered Medications   Medication Dose Route Frequency    [Held by provider] furosemide (LASIX) tablet 40 mg  40 mg Oral BID    ergocalciferol capsule 50,000 Units  50,000 Units Oral Q7D    carvediloL (COREG) tablet 12.5 mg  12.5 mg Oral BID WITH MEALS    amLODIPine (NORVASC) tablet 5 mg  5 mg Oral BID    sodium chloride (NS) flush 5-40 mL  5-40 mL IntraVENous Q8H    sodium chloride (NS) flush 5-40 mL  5-40 mL IntraVENous PRN    acetaminophen (TYLENOL) tablet 650 mg  650 mg Oral Q6H PRN    Or    acetaminophen (TYLENOL) suppository 650 mg  650 mg Rectal Q6H PRN    polyethylene glycol (MIRALAX) packet 17 g  17 g Oral DAILY PRN    ondansetron (ZOFRAN ODT) tablet 4 mg  4 mg Oral Q6H PRN    Or    ondansetron (ZOFRAN) injection 4 mg  4 mg IntraVENous Q6H PRN    atorvastatin (LIPITOR) tablet 80 mg  80 mg Oral QHS    apixaban (ELIQUIS) tablet 2.5 mg  2.5 mg Oral BID    escitalopram oxalate (LEXAPRO) tablet 10 mg  10 mg Oral DAILY    folic acid (FOLVITE) tablet 1 mg  1 mg Oral DAILY    venlafaxine-SR (EFFEXOR-XR) capsule 75 mg  75 mg Oral DAILY    labetaloL (NORMODYNE;TRANDATE) injection 10 mg  10 mg IntraVENous Q2H PRN        Objective:   Vitals:  Visit Vitals  BP (!) 103/51   Pulse 71   Temp 97.7 °F (36.5 °C)   Resp 20   Ht 5' 5\" (1.651 m)   Wt 58.7 kg (129 lb 6.6 oz)   SpO2 95%   BMI 21.53 kg/m²     Temp (24hrs), Av.8 °F (36.6 °C), Min:97.7 °F (36.5 °C), Max:98 °F (36.7 °C)    O2 Flow Rate (L/min): 2 l/min O2 Device: None (Room air)    Last 24hr Input/Output:    Intake/Output Summary (Last 24 hours) at 2023 1147  Last data filed at 2023 1916  Gross per 24 hour   Intake --   Output 325 ml   Net -325 ml          Physical Exam:    Seen in Room 212. General:  Sleeping comfortably but easily arousable     HEENT: Sclerae without icterus. .     Lungs : No wheezes, No rales    CVS: No S3 gallop     Abdomen: Not distended. Neurologic:  Responding appropriately    Legs : No oedema    Psych: normal affect       [] Telemetry Reviewed     [] NSR [] PAC/PVCs   [] Afib  [] Paced   [] NSVT   [] Funes [] NGT  [] Intubated on vent    Lab Data Reviewed:    Recent Results (from the past 24 hour(s))   CBC WITH AUTOMATED DIFF    Collection Time: 23  5:22 AM   Result Value Ref Range    WBC 8.2 3.6 - 11.0 K/uL    RBC 2.67 (L) 3.80 - 5.20 M/uL    HGB 7.7 (L) 11.5 - 16.0 g/dL    HCT 25.1 (L) 35.0 - 47.0 %    MCV 94.0 80.0 - 99.0 FL    MCH 28.8 26.0 - 34.0 PG    MCHC 30.7 30.0 - 36.5 g/dL    RDW 12.5 11.5 - 14.5 %    PLATELET 822 150 - 153 K/uL    MPV 10.0 8.9 - 12.9 FL    NRBC 0.0 0  WBC    ABSOLUTE NRBC 0.00 0.00 - 0.01 K/uL    NEUTROPHILS 60 32 - 75 %    LYMPHOCYTES 23 12 - 49 %    MONOCYTES 10 5 - 13 %    EOSINOPHILS 5 0 - 7 %    BASOPHILS 1 0 - 1 %    IMMATURE GRANULOCYTES 1 (H) 0.0 - 0.5 %    ABS. NEUTROPHILS 5.0 1.8 - 8.0 K/UL    ABS. LYMPHOCYTES 1.9 0.8 - 3.5 K/UL    ABS. MONOCYTES 0.8 0.0 - 1.0 K/UL    ABS. EOSINOPHILS 0.4 0.0 - 0.4 K/UL    ABS. BASOPHILS 0.1 0.0 - 0.1 K/UL    ABS. IMM.  GRANS. 0.0 0.00 - 0.04 K/UL    DF AUTOMATED     RENAL FUNCTION PANEL    Collection Time: 23  5:22 AM   Result Value Ref Range    Sodium 138 136 - 145 mmol/L    Potassium 4.1 3.5 - 5.1 mmol/L    Chloride 108 97 - 108 mmol/L    CO2 24 21 - 32 mmol/L    Anion gap 6 5 - 15 mmol/L    Glucose 89 65 - 100 mg/dL    BUN 47 (H) 6 - 20 MG/DL    Creatinine 4.02 (H) 0.55 - 1.02 MG/DL    BUN/Creatinine ratio 12 12 - 20      eGFR 10 (L) >60 ml/min/1.73m2    Calcium 8.1 (L) 8.5 - 10.1 MG/DL    Phosphorus 4.7 2.6 - 4.7 MG/DL    Albumin 1.7 (L) 3.5 - 5.0 g/dL           Assessment/Plan:   Active Problems:    CHF exacerbation (HCC) (1/29/2023)      JIM (acute kidney injury) ([facility] (0/98/3427)      Diastolic CHF, chronic (HCC) (1/30/2023)      Decreased functional mobility and endurance (1/30/2023)      Abnormal CT of brain (1/30/2023)       JIM on CKD4 versus progression of CKD4-most likely progression of her CKD  -DRE no hydro, NO MRD? Nl size kidneys  -no prior nephrology w/up. Has nephrotic syndrome, had proteinuria in 2021 also. UA bland  -no need for RRT, family does not want dialysis  -avoid NSAIDs and Iv contrast  -check bladder scan  -Is and Os  - Furosemide  (on hold)  -     Nephrotic syndrome,   --Ddx includes MGN, amyloidosis, Multiple myeloma, no h/o DM, TIN, etc  -f/up  serology work up, A1C  -Needs renal biopsy but Given her age and comorbidities recommend conservative management. Daughter also is not interested in the aggressive  management including renal biopsy and dialysis  -monitor renal function. -MED, ANCA neg, complement nl, A1C 4.8.   SPEP and the rest of serology pending  -HBsAg neg, HBcore Ab Ig M positive , checkHBsAb    Hypokalemia, repleted    Secondary hyperpara  -, vit D 20, start vit D     Uncontrolled HTN  -increased amlodipine to 5 mg bid     Acute on chronic HFpEF  --CXR with increase pulm edema and lt pleural effuson  - Furosemide (on hold)      Anemia of CKD  -low iron 17%, IV iron     HLD      Follow lytes in the am (2/5/23)  ___________________________________________________    Total time spent with patient:  [] 15   [] 25   [] 35   []  __ minutes    [] Critical Care Provided    Care Plan discussed with:          [x] Patient   [] Family    [] Care Manager   [] Consultant/Specialist :      []   >50% of visit spent in counseling and coordination of care   (Discussed [] CODE status,  [] Care Plan, [] D/C Planning)    ___________________________________________________    Attending Physician: Elieser Mckeon, [facility]

## 2023-02-05 NOTE — PROGRESS NOTES
[address] [facility]  Hospitalist Group                                                                                          Hospitalist Progress Note  Marlen Castle MD  Answering service: [phone] from in house phone        Date of Service:  2023  NAME:  Cristy Marquez  :  1932  MRN:  [id number]       Admission Summary:   HPI: Analilia Sarmiento is a 80 y.o. female from [address] [address] [place] with h/o HTN, afib on Eliquis, CKD, CHF on torsemide, and mostly nonverbal per daughter who p/w increasing generalized edema over the last several days, also lethargy and AMS. Per daughter, she speaks minimally \"she just doesn't want to speak\" (possibly because of a stutter), she sleeps [place] lot,\" doesn't eat/drink much and doesn't like being told what to do. Per daughter, she developed edema about a week ago, but she looked better on Thursday. She had developed facial edema initially which was felt to be d/t her sleeping most of the day with her head down in her wheelchair. No known falls. She has been wheelchair-bound for over a year. The daughter says the facility takes good care of her. Pt in NAD. Denies pain. \"          Interval history / Subjective:   Patient seen examined earlier bedside, resting comfortably. Denies any acute concerns or complaints this morning. Respirating well on RA since yesterday. Appears mildly dry today, will hold lasix today. Daughter updated via phone. Assessment & Plan:     Anasarca, improving   JIM on CKD stage IV > nephrotic syndrome?   HFpEF exacerbation class II  HypoK  Hypertension  Acute hypoxic respiratory failure secondary to pleural edema, resolved   - Requires gentle diuresis due to JIM on CKD  -TTE appears stable ef  -DRE neg for hydro  -Per notes, family does not want dialysis, but feels patient is not ready for biopsy at this time   -ANCAs, complements ordered, patient will need renal biopsy to confirm diagnosis but age and other comorbidities would like to continue with conservative management  -replete lytes and follow  -Discussed with nephro, patient likely at her new baseline creatinine  -On RA, holding lasix today given mild increase in Cr and appears mildly dehydrated today    Acute metabolic encephalopathy on chronic dementia  - CT head showed age-indeterminate infarct, MRI brain consistent with chronic findings  - UA neg, tsh b12 folate wnl   - Passed swallow eval     History of A. fib currently NSR  Chronic cerebellar infarct  -cont Eliquis, statin Continue metoprolol    Anemia of Chronic Disease  -FOBT is negative, anemia work-up consistent with ACD likely secondary to renal insufficiency  -Continue to monitor CBC, transfuse for hemoglobin less than 7    Depression -New SSRI/SNRI  Hyperlipidemia - statin   Generalized deconditioning, pt bedbound at baseline - PT OT    Critically ill high risk for decompensation.   CCT time 30 minutes    Palliative has also been consulted for goals of care, appreciate involvement        Code status: dnr  Prophylaxis: Eliquis  Care Plan discussed with: patient/family, nurse, case management  Anticipated Disposition:   Inpatient 24 hrs , likely return to [facility] when ready  Cardiac monitoring: Telemetry     Hospital Problems  Date Reviewed: 10/1/2021            Codes Class Noted POA    Diastolic CHF, chronic ([facility] ICD-10-CM: I50.32  ICD-9-CM: 428.32, 428.0  1/30/2023 Unknown        Decreased functional mobility and endurance ICD-10-CM: Z74.09  ICD-9-CM: 780.99  1/30/2023 Unknown        Abnormal CT of brain ICD-10-CM: R90.89  ICD-9-CM: 793.0  1/30/2023 Unknown        CHF exacerbation ([facility] ICD-10-CM: I50.9  ICD-9-CM: 428.0  1/29/2023 Unknown        JIM (acute kidney injury) ([facility] ICD-10-CM: N17.9  ICD-9-CM: 584.9  1/29/2023 Unknown         Social Determinants of Health     Tobacco Use: Low Risk     Smoking Tobacco Use: Never    Smokeless Tobacco Use: Never    Passive Exposure: Not on file   Alcohol Use: Not on file   Financial Resource Strain: Not on file   Food Insecurity: Not on file   Transportation Needs: Not on file   Physical Activity: Not on file   Stress: Not on file   Social Connections: Not on file   Intimate Partner Violence: Not on file   Depression: Not on file   Housing Stability: Not on file       Review of Systems:   A comprehensive review of systems was negative except for that written in the HPI. Vital Signs:    Last 24hrs VS reviewed since prior progress note. Most recent are:  Visit Vitals  BP (!) 103/51   Pulse 71   Temp 97.7 °F (36.5 °C)   Resp 20   Ht 5' 5\" (1.651 m)   Wt 58.7 kg (129 lb 6.6 oz)   SpO2 95%   BMI 21.53 kg/m²         Intake/Output Summary (Last 24 hours) at 2/5/2023 1418  Last data filed at 2/4/2023 1916  Gross per 24 hour   Intake --   Output 325 ml   Net -325 ml        Physical Examination:     I had a face to face encounter with this patient and independently examined them on 2/5/2023 as outlined below:          Constitutional:  No acute distress, cooperative, pleasant, demented   ENT:  Oral mucosa moist, oropharynx benign. Resp:  Diminished breath sounds bilaterally. No wheezing/rhonchi/rales. No accessory muscle use. CV:  Regular rhythm, normal rate, no murmurs, gallops, rubs    GI:  Soft, non distended, non tender. normoactive bowel sounds, no hepatosplenomegaly     Musculoskeletal:  Trace edema, warm, 2+ pulses throughout    Neurologic:  Moves all extremities.   AAOx0, CN II-XII reviewed            Data Review:    Review and/or order of clinical lab test  Review and/or order of tests in the radiology section of CPT  Review and/or order of tests in the medicine section of CPT    I have independently reviewed and interpreted patient's lab and all other diagnostic data    Labs:     Recent Labs     02/05/23 0522 02/04/23  0400   WBC 8.2 9.2   HGB 7.7* 8.5*   HCT 25.1* 26.6*    357     Recent Labs     02/05/23  0522 02/04/23  0400 02/03/23  0508    138 139   K 4.1 4.1 3.9    107 108   CO2 24 24 25   BUN 47* 44* 42*   CREA 4.02* 3.95* 3.82*   GLU 89 99 97   CA 8.1* 8.4* 7.8*   PHOS 4.7 4.5 4.0     Recent Labs     02/05/23  0522 02/04/23  0400 02/03/23  0508   ALB 1.7* 1.9* 1.8*     No results for input(s): INR, PTP, APTT, INREXT, INREXT in the last 72 hours. No results for input(s): FE, TIBC, PSAT, FERR in the last 72 hours. Lab Results   Component Value Date/Time    Folate 88.9 (H) 01/29/2023 01:49 PM        No results for input(s): PH, PCO2, PO2 in the last 72 hours. No results for input(s): CPK, CKNDX, TROIQ in the last 72 hours. No lab exists for component: CPKMB  Lab Results   Component Value Date/Time    Cholesterol, total 298 (H) 04/25/2021 03:01 AM    HDL Cholesterol 38 04/25/2021 03:01 AM    LDL, calculated 211.6 (H) 04/25/2021 03:01 AM    Triglyceride 242 (H) 04/25/2021 03:01 AM    CHOL/HDL Ratio 7.8 (H) 04/25/2021 03:01 AM     Lab Results   Component Value Date/Time    Glucose (POC) 87 01/30/2023 04:19 PM     Lab Results   Component Value Date/Time    Color YELLOW/STRAW 01/29/2023 06:51 PM    Appearance CLEAR 01/29/2023 06:51 PM    Specific gravity 1.011 01/29/2023 06:51 PM    Specific gravity 1.020 04/23/2021 03:44 PM    pH (UA) 7.0 01/29/2023 06:51 PM    Protein 300 (A) 01/29/2023 06:51 PM    Glucose 100 (A) 01/29/2023 06:51 PM    Ketone Negative 01/29/2023 06:51 PM    Bilirubin Negative 01/29/2023 06:51 PM    Urobilinogen 0.2 01/29/2023 06:51 PM    Nitrites Negative 01/29/2023 06:51 PM    Leukocyte Esterase Negative 01/29/2023 06:51 PM    Epithelial cells FEW 01/29/2023 06:51 PM    Bacteria 2+ (A) 01/29/2023 06:51 PM    WBC 0-4 01/29/2023 06:51 PM    RBC 0-5 01/29/2023 06:51 PM       Notes reviewed from all clinical/nonclinical/nursing services involved in patient's clinical care. Care coordination discussions were held with appropriate clinical/nonclinical/ nursing providers based on care coordination needs.          Patients current active Medications were reviewed, considered, added and adjusted based on the clinical condition today. Home Medications were reconciled to the best of my ability given all available resources at the time of admission. Route is PO if not otherwise noted.       Medications Reviewed:     Current Facility-Administered Medications   Medication Dose Route Frequency    [Held by provider] furosemide (LASIX) tablet 40 mg  40 mg Oral BID    ergocalciferol capsule 50,000 Units  50,000 Units Oral Q7D    carvediloL (COREG) tablet 12.5 mg  12.5 mg Oral BID WITH MEALS    amLODIPine (NORVASC) tablet 5 mg  5 mg Oral BID    sodium chloride (NS) flush 5-40 mL  5-40 mL IntraVENous Q8H    sodium chloride (NS) flush 5-40 mL  5-40 mL IntraVENous PRN    acetaminophen (TYLENOL) tablet 650 mg  650 mg Oral Q6H PRN    Or    acetaminophen (TYLENOL) suppository 650 mg  650 mg Rectal Q6H PRN    polyethylene glycol (MIRALAX) packet 17 g  17 g Oral DAILY PRN    ondansetron (ZOFRAN ODT) tablet 4 mg  4 mg Oral Q6H PRN    Or    ondansetron (ZOFRAN) injection 4 mg  4 mg IntraVENous Q6H PRN    atorvastatin (LIPITOR) tablet 80 mg  80 mg Oral QHS    apixaban (ELIQUIS) tablet 2.5 mg  2.5 mg Oral BID    escitalopram oxalate (LEXAPRO) tablet 10 mg  10 mg Oral DAILY    folic acid (FOLVITE) tablet 1 mg  1 mg Oral DAILY    venlafaxine-SR (EFFEXOR-XR) capsule 75 mg  75 mg Oral DAILY    labetaloL (NORMODYNE;TRANDATE) injection 10 mg  10 mg IntraVENous Q2H PRN     ______________________________________________________________________  EXPECTED LENGTH OF STAY: 3d 21h  ACTUAL LENGTH OF STAY:          7                 Bibiana Rodriguez MD

## 2023-02-05 NOTE — PROGRESS NOTES
Bedside and Verbal shift change report given to [address] (oncoming nurse) by Bro Shaw RN (offgoing nurse). Report included the following information SBAR, Kardex, OR Summary, Intake/Output, MAR, and Recent Results.

## 2023-02-06 VITALS
OXYGEN SATURATION: 93 % | DIASTOLIC BLOOD PRESSURE: 67 MMHG | HEIGHT: 65 IN | WEIGHT: 129.41 LBS | TEMPERATURE: 98.8 F | SYSTOLIC BLOOD PRESSURE: 150 MMHG | BODY MASS INDEX: 21.56 KG/M2 | RESPIRATION RATE: 18 BRPM | HEART RATE: 84 BPM

## 2023-02-06 LAB
ALBUMIN SERPL-MCNC: 1.6 G/DL (ref 3.5–5)
ANION GAP SERPL CALC-SCNC: 8 MMOL/L (ref 5–15)
BASOPHILS # BLD: 0.1 K/UL (ref 0–0.1)
BASOPHILS NFR BLD: 1 % (ref 0–1)
BUN SERPL-MCNC: 47 MG/DL (ref 6–20)
BUN/CREAT SERPL: 11 (ref 12–20)
CALCIUM SERPL-MCNC: 7.9 MG/DL (ref 8.5–10.1)
CHLORIDE SERPL-SCNC: 109 MMOL/L (ref 97–108)
CO2 SERPL-SCNC: 23 MMOL/L (ref 21–32)
CREAT SERPL-MCNC: 4.14 MG/DL (ref 0.55–1.02)
DIFFERENTIAL METHOD BLD: ABNORMAL
EOSINOPHIL # BLD: 0.3 K/UL (ref 0–0.4)
EOSINOPHIL NFR BLD: 3 % (ref 0–7)
ERYTHROCYTE [DISTWIDTH] IN BLOOD BY AUTOMATED COUNT: 12.3 % (ref 11.5–14.5)
GLUCOSE BLD STRIP.AUTO-MCNC: 150 MG/DL (ref 65–117)
GLUCOSE SERPL-MCNC: 78 MG/DL (ref 65–100)
HCT VFR BLD AUTO: 22.9 % (ref 35–47)
HGB BLD-MCNC: 7 G/DL (ref 11.5–16)
IMM GRANULOCYTES # BLD AUTO: 0 K/UL (ref 0–0.04)
IMM GRANULOCYTES NFR BLD AUTO: 0 % (ref 0–0.5)
LYMPHOCYTES # BLD: 1.9 K/UL (ref 0.8–3.5)
LYMPHOCYTES NFR BLD: 20 % (ref 12–49)
MCH RBC QN AUTO: 28.7 PG (ref 26–34)
MCHC RBC AUTO-ENTMCNC: 30.6 G/DL (ref 30–36.5)
MCV RBC AUTO: 93.9 FL (ref 80–99)
MONOCYTES # BLD: 0.8 K/UL (ref 0–1)
MONOCYTES NFR BLD: 9 % (ref 5–13)
NEUTS SEG # BLD: 6.2 K/UL (ref 1.8–8)
NEUTS SEG NFR BLD: 67 % (ref 32–75)
NRBC # BLD: 0 K/UL (ref 0–0.01)
NRBC BLD-RTO: 0 PER 100 WBC
PHOSPHATE SERPL-MCNC: 4.9 MG/DL (ref 2.6–4.7)
PLATELET # BLD AUTO: 358 K/UL (ref 150–400)
PMV BLD AUTO: 10 FL (ref 8.9–12.9)
POTASSIUM SERPL-SCNC: 4.1 MMOL/L (ref 3.5–5.1)
RBC # BLD AUTO: 2.44 M/UL (ref 3.8–5.2)
SERVICE CMNT-IMP: ABNORMAL
SODIUM SERPL-SCNC: 140 MMOL/L (ref 136–145)
WBC # BLD AUTO: 9.2 K/UL (ref 3.6–11)

## 2023-02-06 PROCEDURE — 74011250636 HC RX REV CODE- 250/636: Performed by: INTERNAL MEDICINE

## 2023-02-06 PROCEDURE — 74011250637 HC RX REV CODE- 250/637: Performed by: STUDENT IN AN ORGANIZED HEALTH CARE EDUCATION/TRAINING PROGRAM

## 2023-02-06 PROCEDURE — 74011250636 HC RX REV CODE- 250/636: Performed by: STUDENT IN AN ORGANIZED HEALTH CARE EDUCATION/TRAINING PROGRAM

## 2023-02-06 PROCEDURE — 85025 COMPLETE CBC W/AUTO DIFF WBC: CPT

## 2023-02-06 PROCEDURE — 82652 VIT D 1 25-DIHYDROXY: CPT

## 2023-02-06 PROCEDURE — 82962 GLUCOSE BLOOD TEST: CPT

## 2023-02-06 PROCEDURE — 74011000250 HC RX REV CODE- 250: Performed by: INTERNAL MEDICINE

## 2023-02-06 PROCEDURE — 80069 RENAL FUNCTION PANEL: CPT

## 2023-02-06 PROCEDURE — 36415 COLL VENOUS BLD VENIPUNCTURE: CPT

## 2023-02-06 PROCEDURE — 74011250637 HC RX REV CODE- 250/637: Performed by: INTERNAL MEDICINE

## 2023-02-06 RX ORDER — FUROSEMIDE 40 MG/1
TABLET ORAL
Qty: 30 TABLET | Refills: 0 | Status: SHIPPED
Start: 2023-02-06

## 2023-02-06 RX ADMIN — AMLODIPINE BESYLATE 5 MG: 5 TABLET ORAL at 09:24

## 2023-02-06 RX ADMIN — APIXABAN 2.5 MG: 2.5 TABLET, FILM COATED ORAL at 09:24

## 2023-02-06 RX ADMIN — SODIUM CHLORIDE, PRESERVATIVE FREE 10 ML: 5 INJECTION INTRAVENOUS at 06:17

## 2023-02-06 RX ADMIN — CARVEDILOL 12.5 MG: 12.5 TABLET, FILM COATED ORAL at 09:24

## 2023-02-06 RX ADMIN — SODIUM CHLORIDE 250 ML: 9 INJECTION, SOLUTION INTRAVENOUS at 12:35

## 2023-02-06 RX ADMIN — VENLAFAXINE HYDROCHLORIDE 75 MG: 37.5 CAPSULE, EXTENDED RELEASE ORAL at 09:24

## 2023-02-06 RX ADMIN — ESCITALOPRAM 10 MG: 10 TABLET, FILM COATED ORAL at 09:24

## 2023-02-06 RX ADMIN — FOLIC ACID 1 MG: 1 TABLET ORAL at 09:24

## 2023-02-06 RX ADMIN — SODIUM CHLORIDE, PRESERVATIVE FREE 10 ML: 5 INJECTION INTRAVENOUS at 13:05

## 2023-02-06 RX ADMIN — EPOETIN ALFA-EPBX 10000 UNITS: 10000 INJECTION, SOLUTION INTRAVENOUS; SUBCUTANEOUS at 11:40

## 2023-02-06 NOTE — PROGRESS NOTES
Dipesh  66. @lab called, [employer] them redrawing of Vitamin D, 1,25 dihydroxy ordered on 1/29/23 d/t lack of blood.

## 2023-02-06 NOTE — PROGRESS NOTES
Bedside and Verbal shift change report given to [address] (oncoming nurse) by Alejandro Rogers RN (offgoing nurse). Report included the following information SBAR, Kardex, ED Summary, Procedure Summary, Intake/Output, MAR, and Recent Results.

## 2023-02-06 NOTE — PROGRESS NOTES
Transition of Care    RUR: 19%    Rebecca Davis was called. A message was left for the nursing supervisor to call me back. [facility] was called. A 3:00 pm ambulance was arranged. The dispatch packet was initiated. Ms. Ahbishek Vieira and Mrs. Mckeon were informed. 1:45pm     Carlie , Nursing supervisor, was called. A message was left informing her that Ms. Leigh was returning to the facility at 3:00 pm.    2:30 pm    Rebecca Davis was called. Spoke to [facility]. Mrs. Abhishek Vieira was approved to return. Nursing was informed. Mrs. Mckeon was called. She was informed that her mother was returning to [facility] at 3:00 pm.    Will continue to follow for discharge planning.   Signed By: Chel Watkins LCSW     February 6, 2023

## 2023-02-06 NOTE — DISCHARGE SUMMARY
Discharge Summary       PATIENT ID: Kaya Casiano  MRN: [id number]   YOB: 1932    DATE OF ADMISSION: 1/29/2023  1:25 PM    DATE OF DISCHARGE: 02/06/23    PRIMARY CARE PROVIDER: Martinez Giraldo MD     ATTENDING PHYSICIAN: Anali Arnett MD   DISCHARGING PROVIDER: Anali Arnett MD    To contact this individual call [phone] and ask the  to page. If unavailable ask to be transferred the Adult Hospitalist Department. CONSULTATIONS: IP CONSULT TO NEPHROLOGY  IP CONSULT TO PALLIATIVE CARE - PROVIDER  IP CONSULT TO NEUROLOGY  IP CONSULT TO CARDIOLOGY    PROCEDURES/SURGERIES: * No surgery found *    ADMITTING DIAGNOSES & HOSPITAL COURSE:   Anasarca  JIM on CKD4 v Progressive CKD  HFpEF  Acute metabolic encephalopathy   HTN  H/o A fib, currently NSR  Low grade temp  Anemia  Depression  HLD      Kaya Casiano is a 80 y.o. female who presented with generalized edema and lethargy. She was found to have JIM concerning for possible nephrotic syndrome and volume overload. Nephrology was consulted who assisted with diuresis of patient given advanced kidney dysfunction. Patient's family has opted against dialysis even if patient develops ESRD, given her advanced age and co-morbidities. Patient was gently diuresed and on the day of discharge, her creatinine was stable at 4.12. Patient will be discharged with lasix 40mg po daily prn for LE swelling/edema and have close followup with nephrology. DISCHARGE DIAGNOSES / PLAN:       Progressive of CKD IV: Cr 4.12 at time of discharge, stable on RA. Lasix 40mg daily prn for LE edema can be started in 2-3 days if swelling should occur. Please avoid any NSAID use. Follow-up with nephrology. Nephrotic syndrome: as above, follow-up with nephrology  Positive Hbcore AB IGM: patient's hepatitis panel notable for positive HB core Ab IgM, with negative HBsAg. HBsAb positive. HBV DNA negative by PCR.  Suspect patient's Hep B Core AB IgM is a false positive, as surface antigen and DNA are negative. Positive surface antibody likely represents immunity from vaccination. Recommend repeat Hepatitis panel testing at PCP follow-up. Acute hypoxic respiratory failure 2/2 pleural edema: resolved   Acute metabolic encephalopathy on chronic dementia: at baseline   History of A fib currently NSR   Chronic cerebellar infarct   Anemia of Chronic Disease   Depression  HLD  Generalized deconditioning, chronic        PENDING TEST RESULTS:   At the time of discharge the following test results are still pending: None    FOLLOW UP APPOINTMENTS:    Follow-up Information       Follow up With Specialties Details Why Contact Info    Leanne Sunshine MD Family Medicine Follow up on 2/14/2023 You will be seen by Dr. Noemi Sanchez on 2/14/23 at your facility. [address] [place] [id number] [phone]      Mich Minor MD Cardiovascular Disease Physician Go on 2/9/2023 at 10:20 am on 2/9/23 [address]  [phone]               ADDITIONAL CARE RECOMMENDATIONS: As above     DIET: Resume previous diet    ACTIVITY: Bedrest and PT/OT Eval and Treat    WOUND CARE: None    EQUIPMENT needed: None      DISCHARGE MEDICATIONS:  Current Discharge Medication List        START taking these medications    Details   furosemide (LASIX) 40 mg tablet Give 40mg po daily prn for lower extremity swelling. Do not start prior to 2/8/23. Qty: 30 Tablet, Refills: 0  Start date: 2/6/2023           CONTINUE these medications which have NOT CHANGED    Details   potassium chloride (KLOR-CON) 10 mEq tablet Take 10 mEq by mouth daily. acetaminophen (TYLENOL) 325 mg tablet Take 650 mg by mouth every six (6) hours as needed for Pain. albuterol-ipratropium (DUO-NEB) 2.5 mg-0.5 mg/3 ml nebu 3 mL by Nebulization route two (2) times a day. amLODIPine (NORVASC) 5 mg tablet Take 1 Tablet by mouth daily.   Qty: 30 Tablet, Refills: 5      cyanocobalamin 1,000 mcg tablet Take 1 Tab by mouth daily. Qty: 30 Tab, Refills: 0      atorvastatin (LIPITOR) 80 mg tablet Take 1 Tab by mouth nightly. Qty: 30 Tab, Refills: 0      carvediloL (COREG) 12.5 mg tablet Take 1 Tab by mouth two (2) times daily (with meals). Qty: 60 Tab, Refills: 5    Associated Diagnoses: Essential hypertension      escitalopram oxalate (LEXAPRO) 10 mg tablet Take 10 mg by mouth daily. cetirizine (ZYRTEC) 10 mg tablet Take 10 mg by mouth two (2) times a day. folic acid (FOLVITE) 1 mg tablet Take 1 mg by mouth daily. Eliquis 2.5 mg tablet TAKE 1 TABLET BY MOUTH TWICE A DAY  Qty: 60 Tab, Refills: 2    Associated Diagnoses: Paroxysmal atrial fibrillation (HCC)      venlafaxine-SR (EFFEXOR-XR) 75 mg capsule Take 75 mg by mouth daily. STOP taking these medications       torsemide (DEMADEX) 100 mg tablet Comments:   Reason for Stopping:                 NOTIFY YOUR PHYSICIAN FOR ANY OF THE FOLLOWING:   Fever over 101 degrees for 24 hours. Chest pain, shortness of breath, fever, chills, nausea, vomiting, diarrhea, change in mentation, falling, weakness, bleeding. Severe pain or pain not relieved by medications. Or, any other signs or symptoms that you may have questions about.     DISPOSITION:    Home With:   OT  PT  HH  RN      x Long term SNF/Inpatient Rehab    Independent/assisted living    Hospice    Other:       PATIENT CONDITION AT DISCHARGE:     Functional status   x Poor     Deconditioned     Independent      Cognition     Lucid     Forgetful    x Dementia      Catheters/lines (plus indication)    Funes     PICC     PEG    x None      Code status     Full code    x DNR      PHYSICAL EXAMINATION AT DISCHARGE:    Visit Vitals  BP (!) 171/66 (BP 1 Location: Left upper arm, BP Patient Position: At rest;Lying)   Pulse 65   Temp 97.7 °F (36.5 °C)   Resp 18   Ht 5' 5\" (1.651 m)   Wt 58.7 kg (129 lb 6.6 oz)   SpO2 94%   BMI 21.53 kg/m²        Constitutional:  No acute distress, cooperative, pleasant, demented   ENT:  Oral mucosa moist, oropharynx benign. Resp:  Diminished breath sounds bilaterally. No wheezing/rhonchi/rales. No accessory muscle use. CV:  Regular rhythm, normal rate, no murmurs, gallops, rubs    GI:  Soft, non distended, non tender. normoactive bowel sounds, no hepatosplenomegaly     Musculoskeletal:  Trace edema, warm, 2+ pulses throughout    Neurologic:  Moves all extremities.   AAOx0, CN II-XII reviewed       CHRONIC MEDICAL DIAGNOSES:  Problem List as of 2/6/2023 Date Reviewed: 10/1/2021            Codes Class Noted - Resolved    Palliative care by specialist ICD-10-CM: Z51.5  ICD-9-CM: V66.7  1/30/2023 - Present        Diastolic CHF, chronic ([facility] ICD-10-CM: I50.32  ICD-9-CM: 428.32, 428.0  1/30/2023 - Present        Goals of care, counseling/discussion ICD-10-CM: Z71.89  ICD-9-CM: V65.49  1/30/2023 - Present        Decreased functional mobility and endurance ICD-10-CM: Z74.09  ICD-9-CM: 780.99  1/30/2023 - Present        Abnormal CT of brain ICD-10-CM: R90.89  ICD-9-CM: 793.0  1/30/2023 - Present        CHF exacerbation ([facility] ICD-10-CM: I50.9  ICD-9-CM: 428.0  1/29/2023 - Present        JIM (acute kidney injury) ([facility] ICD-10-CM: N17.9  ICD-9-CM: 584.9  1/29/2023 - Present        CVA (cerebral vascular accident) ([facility] ICD-10-CM: I63.9  ICD-9-CM: 434.91  4/24/2021 - Present        Pre-syncope ICD-10-CM: R55  ICD-9-CM: 780.2  4/23/2021 - Present        Edema of upper extremity ICD-10-CM: R60.0  ICD-9-CM: 782.3  2/29/2020 - Present        CKD (chronic kidney disease) ICD-10-CM: N18.9  ICD-9-CM: 585.9  2/29/2020 - Present        Paroxysmal atrial fibrillation ([facility] ICD-10-CM: I48.0  ICD-9-CM: 427.31  2/29/2020 - Present        HTN (hypertension) ICD-10-CM: I10  ICD-9-CM: 401.9  2/29/2020 - Present        Heart failure ([facility] ICD-10-CM: I50.9  ICD-9-CM: 428.9  2/27/2020 - Present           Greater than 31 minutes were spent with the patient on counseling and coordination of care    Signed:   Raheem Graham MD  2/6/2023  11:45 AM

## 2023-02-06 NOTE — PROGRESS NOTES
TRANSFER - OUT REPORT:    Verbal report given to Ying(name) on [facility]  being transferred to [place] [address](unit) for routine progression of care       Report consisted of patients Situation, Background, Assessment and   Recommendations(SBAR). Information from the following report(s) SBAR, Kardex, Intake/Output, MAR, and Cardiac Rhythm SR  was reviewed with the receiving nurse. Lines:   Peripheral IV 01/29/23 Right Antecubital (Active)   Site Assessment Clean, dry, & intact 02/05/23 2030   Phlebitis Assessment 0 02/05/23 2030   Infiltration Assessment 0 02/05/23 2030   Dressing Status Clean, dry, & intact 02/05/23 2030   Dressing Type Transparent 02/05/23 2030   Hub Color/Line Status Yellow 02/05/23 2030   Action Taken Open ports on tubing capped 02/05/23 2030   Alcohol Cap Used Yes 02/05/23 2030       Peripheral IV 01/29/23 Left Antecubital (Active)   Site Assessment Dry; Intact 02/05/23 2030   Phlebitis Assessment 0 02/05/23 2030   Infiltration Assessment 0 02/05/23 2030   Dressing Status Dry; Intact 02/05/23 2030   Dressing Type Transparent 02/05/23 2030   Hub Color/Line Status Capped 02/05/23 2030   Action Taken Open ports on tubing capped 02/05/23 2030   Alcohol Cap Used Yes 02/05/23 2030        Opportunity for questions and clarification was provided.       Patient transported with:   DOMITILA Lin

## 2023-02-06 NOTE — PROGRESS NOTES
Problem: Pressure Injury - Risk of  Goal: *Prevention of pressure injury  Description: Document Gigi Scale and appropriate interventions in the flowsheet. Outcome: Resolved/Met  Note: Pressure Injury Interventions:  Sensory Interventions: Assess changes in LOC, Use 30-degree side-lying position    Moisture Interventions: Absorbent underpads    Activity Interventions: Increase time out of bed    Mobility Interventions: HOB 30 degrees or less    Nutrition Interventions: Document food/fluid/supplement intake    Friction and Shear Interventions: Apply protective barrier, creams and emollients                Problem: Patient Education: Go to Patient Education Activity  Goal: Patient/Family Education  Outcome: Resolved/Met     Problem: Aspiration - Risk of  Goal: *Absence of aspiration  Outcome: Resolved/Met     Problem: Patient Education: Go to Patient Education Activity  Goal: Patient/Family Education  Outcome: Resolved/Met     Problem: Patient Education: Go to Patient Education Activity  Goal: Patient/Family Education  Outcome: Resolved/Met     Problem: Falls - Risk of  Goal: *Absence of Falls  Description: Document Osiel Fall Risk and appropriate interventions in the flowsheet. Outcome: Resolved/Met  Note: Fall Risk Interventions:  Mobility Interventions: Bed/chair exit alarm         Medication Interventions: Evaluate medications/consider consulting pharmacy    Elimination Interventions: Call light in reach    History of Falls Interventions:  Investigate reason for fall         Problem: Patient Education: Go to Patient Education Activity  Goal: Patient/Family Education  Outcome: Resolved/Met

## 2023-02-06 NOTE — PROGRESS NOTES
Renal Progress Note    NAME:  Adelina Spicer   :   1932   MRN:   [id number]     Date/Time:  2023   Subjective:       23-patient seen and examined, sleeping, arousable, denies SONB. On 5 lit O2 this am. Cr higher, no urine output recorded. Has small amount of urine in purewick container    23-Patient awake and alert, feels ok, denies SOB. On 2 lit O2. No labs today      2/3/2023 --> F/U JIM - [place]       No major complaints. Appetite is fair. 23 --> F/U JIM - [place]    No new complaints. 23 -> F/U JIM - [place]    No new complaints. 23-feels ok, no SOB, unclear if she has been eating. Lasix held yesterday.      Review of Systems:  Medications reviewed:  Current Facility-Administered Medications   Medication Dose Route Frequency    [Held by provider] furosemide (LASIX) tablet 40 mg  40 mg Oral BID    ergocalciferol capsule 50,000 Units  50,000 Units Oral Q7D    carvediloL (COREG) tablet 12.5 mg  12.5 mg Oral BID WITH MEALS    amLODIPine (NORVASC) tablet 5 mg  5 mg Oral BID    sodium chloride (NS) flush 5-40 mL  5-40 mL IntraVENous Q8H    sodium chloride (NS) flush 5-40 mL  5-40 mL IntraVENous PRN    acetaminophen (TYLENOL) tablet 650 mg  650 mg Oral Q6H PRN    Or    acetaminophen (TYLENOL) suppository 650 mg  650 mg Rectal Q6H PRN    polyethylene glycol (MIRALAX) packet 17 g  17 g Oral DAILY PRN    ondansetron (ZOFRAN ODT) tablet 4 mg  4 mg Oral Q6H PRN    Or    ondansetron (ZOFRAN) injection 4 mg  4 mg IntraVENous Q6H PRN    atorvastatin (LIPITOR) tablet 80 mg  80 mg Oral QHS    apixaban (ELIQUIS) tablet 2.5 mg  2.5 mg Oral BID    escitalopram oxalate (LEXAPRO) tablet 10 mg  10 mg Oral DAILY    folic acid (FOLVITE) tablet 1 mg  1 mg Oral DAILY    venlafaxine-SR (EFFEXOR-XR) capsule 75 mg  75 mg Oral DAILY    labetaloL (NORMODYNE;TRANDATE) injection 10 mg  10 mg IntraVENous Q2H PRN        Objective:   Vitals:  Visit Vitals  BP (!) 171/66 (BP 1 Location: Left upper arm, BP Patient Position: At rest;Lying)   Pulse 80   Temp 97.7 °F (36.5 °C)   Resp 18   Ht 5' 5\" (1.651 m)   Wt 58.7 kg (129 lb 6.6 oz)   SpO2 94%   BMI 21.53 kg/m²     Temp (24hrs), Av °F (36.7 °C), Min:97.7 °F (36.5 °C), Max:98.2 °F (36.8 °C)    O2 Flow Rate (L/min): 2 l/min O2 Device: None (Room air)    Last 24hr Input/Output:    Intake/Output Summary (Last 24 hours) at 2023 1009  Last data filed at 2023 1926  Gross per 24 hour   Intake --   Output 150 ml   Net -150 ml          Physical Exam:    Seen in Room 212. General:  awake, NAD    HEENT: Sclerae without icterus. .     Lungs : No wheezes, No rales    CVS: No S3 gallop     Abdomen: Not distended. Neurologic:  Responding appropriately    Legs : No oedema    Psych: normal affect       [] Telemetry Reviewed     [] NSR [] PAC/PVCs   [] Afib  [] Paced   [] NSVT   [] Funes [] NGT  [] Intubated on vent    Lab Data Reviewed:    Recent Results (from the past 24 hour(s))   CBC WITH AUTOMATED DIFF    Collection Time: 23  4:07 AM   Result Value Ref Range    WBC 9.2 3.6 - 11.0 K/uL    RBC 2.44 (L) 3.80 - 5.20 M/uL    HGB 7.0 (L) 11.5 - 16.0 g/dL    HCT 22.9 (L) 35.0 - 47.0 %    MCV 93.9 80.0 - 99.0 FL    MCH 28.7 26.0 - 34.0 PG    MCHC 30.6 30.0 - 36.5 g/dL    RDW 12.3 11.5 - 14.5 %    PLATELET 226 221 - 392 K/uL    MPV 10.0 8.9 - 12.9 FL    NRBC 0.0 0  WBC    ABSOLUTE NRBC 0.00 0.00 - 0.01 K/uL    NEUTROPHILS 67 32 - 75 %    LYMPHOCYTES 20 12 - 49 %    MONOCYTES 9 5 - 13 %    EOSINOPHILS 3 0 - 7 %    BASOPHILS 1 0 - 1 %    IMMATURE GRANULOCYTES 0 0.0 - 0.5 %    ABS. NEUTROPHILS 6.2 1.8 - 8.0 K/UL    ABS. LYMPHOCYTES 1.9 0.8 - 3.5 K/UL    ABS. MONOCYTES 0.8 0.0 - 1.0 K/UL    ABS. EOSINOPHILS 0.3 0.0 - 0.4 K/UL    ABS. BASOPHILS 0.1 0.0 - 0.1 K/UL    ABS. IMM.  GRANS. 0.0 0.00 - 0.04 K/UL    DF AUTOMATED     RENAL FUNCTION PANEL    Collection Time: 23  4:07 AM   Result Value Ref Range    Sodium 140 136 - 145 mmol/L    Potassium 4.1 3.5 - 5.1 mmol/L    Chloride 109 (H) 97 - 108 mmol/L    CO2 23 21 - 32 mmol/L    Anion gap 8 5 - 15 mmol/L    Glucose 78 65 - 100 mg/dL    BUN 47 (H) 6 - 20 MG/DL    Creatinine 4.14 (H) 0.55 - 1.02 MG/DL    BUN/Creatinine ratio 11 (L) 12 - 20      eGFR 10 (L) >60 ml/min/1.73m2    Calcium 7.9 (L) 8.5 - 10.1 MG/DL    Phosphorus 4.9 (H) 2.6 - 4.7 MG/DL    Albumin 1.6 (L) 3.5 - 5.0 g/dL           Assessment/Plan:   Active Problems:    CHF exacerbation (HCC) (1/29/2023)      JIM (acute kidney injury) ([facility] (1/74/1598)      Diastolic CHF, chronic (HCC) (1/30/2023)      Decreased functional mobility and endurance (1/30/2023)      Abnormal CT of brain (1/30/2023)       JIM on CKD4 versus progression of CKD4-most likely progression of her CKD  -DRE no hydro, NO MRD? Nl size kidneys  -no prior nephrology w/up. Has nephrotic syndrome, had proteinuria in 2021 also. UA bland  -no need for RRT, family does not want dialysis  -avoid NSAIDs and Iv contrast  -check bladder scan  -Is and Os  -agree with holding lasix. Can restart in few days if edema increases with lower dose 40 mg daily     Nephrotic syndrome,   --Ddx includes MGN, amyloidosis, Multiple myeloma, no h/o DM, TIN, etc  -f/up  serology work up, A1C  -Needs renal biopsy but Given her age and comorbidities recommend conservative management. Daughter also is not interested in the aggressive  management including renal biopsy and dialysis  -monitor renal function. -MED, ANCA neg, complement nl, A1C 4.8. SPEP and the rest of serology pending    -HBsAg neg, HBcore Ab Ig M positive , HBsAb positive-viral load negative.  . eval per primary team    Hypokalemia, repleted    Secondary hyperpara  -, vit D 20, start vit D     Uncontrolled HTN  -increased amlodipine to 5 mg bid     Acute on chronic HFpEF  --CXR with increase pulm edema and lt pleural effuson  - Furosemide (on hold)      Anemia of CKD  -low iron 17%, IV iron  -Epo x 1 dose today     HLD    Ok to discharge from renal stand point, needs f/up  with us as outpatient    Discussed with Dr Jabier Steen    ___________________________________________________    Total time spent with patient:  [] 15   [] 25   [] 35   []  __ minutes    [] Critical Care Provided    Care Plan discussed with:          [x] Patient   [] Family    [] Care Manager   [] Consultant/Specialist :      []   >50% of visit spent in counseling and coordination of care   (Discussed [] CODE status,  [] Care Plan, [] D/C Planning)    ___________________________________________________    Attending Physician: MD Nuno Teixeira

## 2023-02-07 ENCOUNTER — PATIENT OUTREACH (OUTPATIENT)
Dept: CASE MANAGEMENT | Age: 88
End: 2023-02-07

## 2023-02-07 LAB — 1,25(OH)2D3 SERPL-MCNC: 10.1 PG/ML (ref 24.8–81.5)

## 2023-02-07 NOTE — PROGRESS NOTES
Care Transitions Initial Call    Call within 2 business days of discharge: Yes     Patient: Allen Crisostomo Patient : 1932 MRN: [id number]    Last Discharge  Street       Date Complaint Diagnosis Description Type Department Provider    23 CHF; Altered mental status Acute on chronic congestive heart failure, unspecified heart failure type ([facility] . .. ED to Hosp-Admission (Discharged) (ADMIT) Bibiana Robert MD; Lorri Lazaro... Was this an external facility discharge? No Discharge Facility: na    Challenges to be reviewed by the provider   Additional needs identified to be addressed with provider: yes  Pt needs daily wts ordered and low Na diet ordered         Method of communication with provider : none, Estephania at [address] [facility] [address] [facility] [address] will discuss with DON    Advance Care Planning:   Does patient have an Advance Directive: not on file. Inpatient Readmission Risk score: Unplanned Readmit Risk Score: 19.1    Was this a readmission? no   Patient stated reason for the admission: did not discuss    Patients top risk factors for readmission: medical condition-HF    Interventions to address risk factors: Obtained and reviewed discharge summary and/or continuity of care documents    Care Transition Nurse (CTN) contacted the caregiver by telephone to perform post hospital discharge assessment. Verified name and  with caregiver as identifiers. Provided introduction to self, and explanation of the CTN role. Spoke with Dada Sherman at [address] [place] [address]    CTN reviewed discharge instructions, medical action plan and red flags with caregiver who verbalized understanding. Were discharge instructions available to patient? yes. Reviewed appropriate site of care based on symptoms and resources available to patient including: PCP and Specialist. Caregiver given an opportunity to ask questions and does not have any further questions or concerns at this time.  The caregiver agrees to contact the PCP office for questions related to their healthcare. Medication reconciliation was performed with caregiver, who verbalizes understanding of administration of home medications. Advised obtaining a 90-day supply of all daily and as-needed medications. Referral to Pharm D needed: no       Discussed follow-up appointments. If no appointment was previously scheduled, appointment scheduling offered:  na . Is follow up appointment scheduled within 7 days of discharge? yes. [address] follow up appointment(s):   Future Appointments   Date Time Provider Lance Alcazar   2/9/2023 10:20 AM MD [facility]     Non-BS follow up appointment(s): PCP 2/14/2023    Plan for follow-up call in 5-7 days based on severity of symptoms and risk factors. Plan for next call:  will discuss daily wts, low Na diet, edema if seen or changed. CTN provided contact information for future needs. Goals Addressed                   This Visit's Progress     Prevent complications post hospitalization. 2/7/2023  Facility will begin weighing pt daily per discharge and will contact provider for wt gain >2-3 lbs over night or 5 lbs in a week. Facility will use low Na diet with pt. One next call we will discuss pt's food availability. Facility transports pt to appts in wheelchair Nidhi Heller and confirmed that PCP will see pt at facility on 2/14/2023 and will see cardiologist on 2/9/2023. Alma Hernández is agreeable to a call in 7 days to discuss pt's wts and diet.   Liset Sheridan RN [address] [facility], Care Transitions Nurse, [phone]

## 2023-02-08 LAB — CH50 SERPL-ACNC: 58 U/ML

## 2023-02-14 ENCOUNTER — PATIENT OUTREACH (OUTPATIENT)
Dept: CASE MANAGEMENT | Age: 88
End: 2023-02-14

## 2023-02-14 NOTE — PROGRESS NOTES
Care Transitions Follow Up Call    Patient Current Location: [place]    Challenges to be reviewed by the provider   Additional needs identified to be addressed with provider: no  none           Method of communication with provider : none    Care Transition Nurse (CTN) contacted the  facility  by telephone to follow up after admission on 2023. Verified name and  with  facility  as identifiers. Addressed changes since last contact:  followed up with PCP today  Follow up appointment completed? yes. Was follow up appointment scheduled within 7 days of discharge? no.     Advance Care Planning:   Does patient have an Advance Directive:   will discuss with facility on next call    Interventions to address risk factors:  reviewed importance of wts, PCP managing    [facility] follow up appointment(s): No future appointments. Non-[facility] follow up appointment(s): unk     Plan for follow-up call in 5-7 days based on severity of symptoms and risk factors. Plan for next call:  will follow up on medications, check for ACP/AD, review symptoms       Goals Addressed                   This Visit's Progress     Prevent complications post hospitalization. 2023  Facility will begin weighing pt daily per discharge and will contact provider for wt gain >2-3 lbs over night or 5 lbs in a week. Facility will use low Na diet with pt. One next call we will discuss pt's food availability. Facility transports pt to appts in wheelchair Shreyas [place] and confirmed that PCP will see pt at facility on 2023 and will see cardiologist on 2023. Aliza Driveras is agreeable to a call in 7 days to discuss pt's wts and diet. Rigoberto Ye RN [address] [facility], Care Transitions Nurse, [phone]     2023  Facility, [facility], reports that pt's daughter opted to not follow up with Dr. Rodolfo Dacosta. Facility reports that pt is weighed a couple of times a week. Facility reports that pt did see Dr. John Rahman today and no new orders noted at this time.   Will call in 7 days to follow up on medications and symptoms.   Ashvin Nunn RN [address] [facility], Care Transitions Nurse, [phone]

## 2023-02-16 NOTE — PROGRESS NOTES
Physician Progress Note      Jaiden Love  CSN #:                  [id number]  :                       1932  ADMIT DATE:       2023 1:25 PM  [address] [place] DATE:        2023 4:01 PM  RESPONDING  PROVIDER #:        Nikita Rivero MD          QUERY TEXT:    Patient is a 80year old with dementia. PT/OT consult noted patient was dependent at baseline with ADLs. After further review, can you further specify the patient's functional status as: The medical record reflects the following:  Risk Factors: 89yo with dementia    Clinical Indicators:  PT  Pt evaled on 2023 and d/c'ed from acute PT due to pt being at baseline which is dependent with ADLs and functional mobility at a wheelchair level at [facility]. No new skilled PT needs at this time    Treatment: PT consulted, complete assist by nursing    Functional quadriplegia (or quadriparesis) is defined as the complete inability to move due to severe disability or frailty caused by another medical condition without physical injury or damage to the brain or spinal cord. Source: https://acphospitalist.org    Thank you,  Arby Sacks  [phone]  I am also available via [employer]. Options provided:  -- Functional quadriplegia 2/2 dementia  -- Complete immobility due to severe disability in the setting of dementia  -- Advanced debility in the setting of dementia without functional quadriplegia  -- Other - I will add my own diagnosis  -- Disagree - Not applicable / Not valid  -- Disagree - Clinically unable to determine / Unknown  -- Refer to Clinical Documentation Reviewer    PROVIDER RESPONSE TEXT:    Provider is clinically unable to determine a response to this query. Query created by: Lincoln Sunshine on 2/10/2023 8:16 AM      QUERY TEXT:    Patient admitted with nephrotic syndrome.  Noted documentation of metabolic encephalopathy, but there is no clear indication that the patient's mental status greatly improved by discharge. In order to support the diagnosis of metabolic encephalopathy, please include additional clinical indicators in your documentation. Or please document if the diagnosis of metabolic encephalopathy has been ruled out after further study. The medical record reflects the following:  Risk Factors: 89yo admitted with dementia and nephrotic syndrome    Clinical Indicators:  H&P  Neurologic: Awake, alert, minimally verbal, answers some questions, states name and . Sensory grossly within normal limits. GORDON  Reported acute metabolic encephalopathy vs baseline mental status  - Likely 2/2 kidney failure  - At baseline pt is minimally verbal    DCS  Moves all extremities. AAOx0, CN II-XII reviewed    Nursing assessments  Admission- Neuro: alert; Appropriate for age; Oreinted to person and place; Decreased command following; Decreased attention/concentration; Follows commands  Discharge- Neuro: Alert; Disoriented x4; Follows commands    Treatment: CT and MRI of head/brain, neuro assessments per unit routine, dialysis    Thank you,  Steven Deng  [phone]  I am also available via [employer]. Options provided:  -- Metabolic encephalopathy present as evidenced by, Please document evidence. -- Metabolic encephalopathy was ruled out  -- Dementia without behavioral disturbance  -- Other - I will add my own diagnosis  -- Disagree - Not applicable / Not valid  -- Disagree - Clinically unable to determine / Unknown  -- Refer to Clinical Documentation Reviewer    PROVIDER RESPONSE TEXT:    Provider is clinically unable to determine a response to this query. Query created by: Flex Uriostegui on 2/10/2023 8:27 AM      QUERY TEXT:    Patient admitted with nephrotic syndrome. Noted documentation of acute on chronic HFpEF in multiple notes during admission. In order to support the diagnosis of acute HFpEF, please include additional clinical indicators in your documentation.   Or please document if the diagnosis of acute HFpEF has been ruled out after further study. The medical record reflects the following:  Risk Factors: 89yo with HTN, HFpEF    Clinical Indicators:  1/30 Echo  Left Ventricle: Normal left ventricular systolic function with a visually estimated EF of 55 - 60%. Left ventricle size is normal. Normal wall thickness. Normal wall motion. 1/30 Cardiology  HFpEF - ProBNP 11,315. edema improving per daughter, EF 60-65% in 2021, on torsemide PTA  Not volume overloaded on PE. Chest xray is not overly concerning. 1/30 Palliative  HFpEF- mild diastolic dysfunction- EF 26-90% in 4/2021 1/31 Cardiology  HsTrop normal, elevated probnp. Echo confirmed normal EF Not volume overloaded on PE. Chest xray is not overly concerning. Cr remains up at 3.49. Hgb stable at 7.8. Multiple medical issues and advanced age. No further cardiac work up necessary as it would not change pt management. 2/1 Chest x-ray  Pulmonary edema pattern has increased. 2/6 Nephrology  Acute on chronic HFpEF  --CXR with increase pulm edema and lt pleural effuson    Treatment: Cardiology and Nephrology following, dialysis, strict I&O, daily weight, chest x-ray    Thank you,  Cory Feldman  [phone]  I am also available via [address] [place] [address]. Options provided:  -- Acute HFpEF present as evidenced by, Please document evidence. -- Acute HFpEF was ruled out, only chronic HFpEF  -- No HFpEF  -- Other - I will add my own diagnosis  -- Disagree - Not applicable / Not valid  -- Disagree - Clinically unable to determine / Unknown  -- Refer to Clinical Documentation Reviewer    PROVIDER RESPONSE TEXT:    Acute HFpEF was ruled out after study, patient only has chronic HFpEF.     Query created by: Deo Resendez on 2/10/2023 8:40 AM      Electronically signed by:  Sophy Abel MD 2/16/2023 12:44 PM

## 2023-02-16 NOTE — PROGRESS NOTES
Physician Progress Note      Loida Cannon  CSN #:                  [id number]  :                       1932  ADMIT DATE:       2023 1:25 PM  [address] [place] DATE:        2023 4:01 PM  RESPONDING  PROVIDER #:        Angelica Grove MD          QUERY TEXT:    Patient admitted with nephrotic syndrome. Noted documentation of acute respiratory failure in PNs starting on 2/3. Hypoxia is noted, but there is no mention of significant respiratory distress. In order to support the diagnosis of acute respiratory failure, please include additional clinical indicators in your documentation. Or please document if the diagnosis of acute respiratory failure has been ruled out after further study. The medical record reflects the following:  Risk Factors: 89yo with pleural edema    Clinical Indicators:  H&P  Lungs: Clear to auscultation bilaterally, symmetric expansion, no respiratory distress    Nursing assessments  Admission- Respiratory: Dyspnea with exertion; Grunting  Discharge- Respiratory: WNL    Treatment: supplemental O2, VS per unit routine, chest x-ray,    Acute Respiratory Failure Clinical Indicators per [employer] Training Guide and Quick Reference Guide:  pO2 < 60 mmHg or SpO2 (pulse oximetry) < 91% breathing room air  pCO2 > 50 and pH < 7.35  P/F ratio (pO2 / FIO2) < 300  pO2 decrease or pCO2 increase by 10 mmHg from baseline (if known)  Supplemental oxygen of 40% or more  Presence of respiratory distress, tachypnea, dyspnea, shortness of breath, wheezing  Unable to speak in complete sentences  Use of accessory muscles to breathe  Extreme anxiety and feeling of impending doom  Tripod position  Confusion/altered mental status/obtunded    Thank you,  Deedee Olivas  [phone]  I am also available via [employer]. Options provided:  -- Acute Respiratory Failure as evidenced by, Please document evidence of respiratory distress.   -- Acute Respiratory Failure ruled out after study  -- Other - I will add my own diagnosis  -- Disagree - Not applicable / Not valid  -- Disagree - Clinically unable to determine / Unknown  -- Refer to Clinical Documentation Reviewer    PROVIDER RESPONSE TEXT:    This patient is in acute respiratory failure as evidenced by hypoxia and increased WOB    Query created by: Ashwini Holliday on 2/16/2023 12:53 PM      Electronically signed by:  Sherman Santiago MD 2/16/2023 5:09 PM

## 2023-02-21 ENCOUNTER — PATIENT OUTREACH (OUTPATIENT)
Dept: CASE MANAGEMENT | Age: 88
End: 2023-02-21

## 2023-02-24 ENCOUNTER — PATIENT OUTREACH (OUTPATIENT)
Dept: CASE MANAGEMENT | Age: 88
End: 2023-02-24

## 2023-02-24 NOTE — PROGRESS NOTES
Care Transitions Follow Up Call    Patient Current Location: [place]    Challenges to be reviewed by the provider   Additional needs identified to be addressed with provider: no  none           Method of communication with provider : none    Care Transition Nurse (CTN) contacted the  [facility]  by telephone to follow up after admission on 2023. Verified name and  with  Sophia Webber  as identifiers. Addressed changes since last contact: none    Heart Failure Note    Do you have a Scale:     weighed at facility    How often do you weigh:  couple of times a week    Zone:(Pt Reported)  green     EF: 55 to 60 % on 2023   Type of HF:   HFpEF     Cardiac Device present:  unk       Heart Failure Medications: Betablocker, Diuretic, Potassium, Anticoagulant        Plan for follow-up call in 10-14 days based on severity of symptoms and risk factors. Plan for next call:  review daily wts for trend, follow up on medications       Goals Addressed                   This Visit's Progress     Prevent complications post hospitalization. 2023  Facility will begin weighing pt daily per discharge and will contact provider for wt gain >2-3 lbs over night or 5 lbs in a week. Facility will use low Na diet with pt. One next call we will discuss pt's food availability. Facility transports pt to appts in wheelchair [facility] and confirmed that PCP will see pt at facility on 2023 and will see cardiologist on 2023. aSkshi Anderson is agreeable to a call in 7 days to discuss pt's wts and diet. David Casillas RN [address] [facility], Care Transitions Nurse, [phone]     2023  Facility, [facility], reports that pt's daughter opted to not follow up with Dr. Wilma Nicholas. Facility reports that pt is weighed a couple of times a week. Facility reports that pt did see Dr. Bryn Webster today and no new orders noted at this time. Will call in 7 days to follow up on medications and symptoms.   David Casillas RN [facility], Care Transitions Nurse, [phone]     2/24/2023  Called [facility] and call transferred to Saurabh who reports that pt is doing good. Saurabh is uncertain of wts and confirms that pt is followed by Dr. Rodrigo Ni. Will review medications on next call and follow up on daily wts.   Shailesh Rogel RN [address] [facility], Care Transitions Nurse, [phone]

## 2023-03-13 ENCOUNTER — PATIENT OUTREACH (OUTPATIENT)
Dept: CASE MANAGEMENT | Age: 88
End: 2023-03-13

## 2023-03-13 NOTE — PROGRESS NOTES
Care Transitions Outreach Attempt    Attempted to reach patient for transitions of care follow up. Unable to reach patient. Called [facility] and call was transferred to pt's nurse. Left voicemail stating my name, CTN with [facility] [employer] [facility], date and time of call, and return telephone number. Patient: Edward Mensah Patient : 1932 MRN: [id number]    Last Discharge  Street       Date Complaint Diagnosis Description Type Department Provider    23 CHF; Altered mental status Acute on chronic congestive heart failure, unspecified heart failure type ([facility] [address] . .. ED to Hosp-Admission (Discharged) (ADMIT) Bibiana Dai MD; Jus Baird... Noted following upcoming appointments from discharge chart review:   [facility] follow up appointment(s): No future appointments.   Non-[facility] follow up appointment(s): gabriellek

## 2023-03-20 ENCOUNTER — PATIENT OUTREACH (OUTPATIENT)
Dept: CASE MANAGEMENT | Age: 88
End: 2023-03-20

## 2023-03-20 NOTE — PROGRESS NOTES
Care Transitions Follow Up Call    Patient Current Location: [place]    Challenges to be reviewed by the provider   Additional needs identified to be addressed with provider: no  none           Method of communication with provider : none    Care Transition Nurse (CTN) contacted the  facility  by telephone to follow up after admission on 2023. Verified name and  with  facilitly  as identifiers. Addressed changes since last contact:  pt was admitted to [facility] on 3/4/2023    CTN provided contact information for future needs. Will call [facility]  based on severity of symptoms and risk factors. Plan for next call:  confirm that pt is in hospice       Goals Addressed                   This Visit's Progress     Prevent complications post hospitalization. 2023  Facility will begin weighing pt daily per discharge and will contact provider for wt gain >2-3 lbs over night or 5 lbs in a week. Facility will use low Na diet with pt. One next call we will discuss pt's food availability. Facility transports pt to appts in wheelchair Kary Mendez and confirmed that PCP will see pt at facility on 2023 and will see cardiologist on 2023. Desirae Vincent is agreeable to a call in 7 days to discuss pt's wts and diet. Chika Kimball RN [address] [facility], Care Transitions Nurse, [phone]     2023  Facility, [facility], reports that pt's daughter opted to not follow up with Dr. Lucetta Mcburney. Facility reports that pt is weighed a couple of times a week. Facility reports that pt did see Dr. Zander Lobo today and no new orders noted at this time. Will call in 7 days to follow up on medications and symptoms. Chika Kimball RN [address] [facility], Care Transitions Nurse, [phone]     2023  Called [facility] and call transferred to Saurabh who reports that pt is doing good. Saurabh is uncertain of wts and confirms that pt is followed by Dr. Zander Lobo. Will review medications on next call and follow up on daily wts. Chana Notice RN [address] [facility], Care Transitions Nurse, [phone]     3/20/2023  Spoke with Daniel Carreon who reports that pt is doing okay. Call placed on hold for nurse to get pt's chart. Nurse reports that pt is in hospice with At [address] [place] [address] and spoke with Maryjane Turcios who confirmed that pt started hospice services on 3/4/2023.   Chana Notice RN [address] [facility], Care Transitions Nurse, [phone]

## 2023-04-19 ENCOUNTER — PATIENT OUTREACH (OUTPATIENT)
Dept: CASE MANAGEMENT | Age: 88
End: 2023-04-19